# Patient Record
Sex: MALE | Race: WHITE | Employment: UNEMPLOYED | URBAN - NONMETROPOLITAN AREA
[De-identification: names, ages, dates, MRNs, and addresses within clinical notes are randomized per-mention and may not be internally consistent; named-entity substitution may affect disease eponyms.]

---

## 2017-01-11 RX ORDER — METOPROLOL TARTRATE 50 MG/1
TABLET, FILM COATED ORAL
Qty: 180 TABLET | Refills: 0 | OUTPATIENT
Start: 2017-01-11

## 2017-03-08 RX ORDER — GABAPENTIN 300 MG/1
CAPSULE ORAL
Qty: 90 CAPSULE | Refills: 0 | OUTPATIENT
Start: 2017-03-08

## 2017-03-08 RX ORDER — OMEPRAZOLE 20 MG/1
CAPSULE, DELAYED RELEASE ORAL
Qty: 90 CAPSULE | Refills: 0 | OUTPATIENT
Start: 2017-03-08

## 2017-04-25 ENCOUNTER — TELEPHONE (OUTPATIENT)
Dept: CARDIOLOGY | Age: 52
End: 2017-04-25

## 2017-07-10 ENCOUNTER — TELEPHONE (OUTPATIENT)
Dept: FAMILY MEDICINE CLINIC | Age: 52
End: 2017-07-10

## 2017-07-10 PROBLEM — Z79.4 UNCONTROLLED TYPE 2 DIABETES MELLITUS WITH HYPERGLYCEMIA, WITH LONG-TERM CURRENT USE OF INSULIN (HCC): Chronic | Status: ACTIVE | Noted: 2017-07-10

## 2017-07-10 PROBLEM — Z72.0 TOBACCO ABUSE: Chronic | Status: ACTIVE | Noted: 2017-07-10

## 2017-07-10 PROBLEM — E11.65 UNCONTROLLED TYPE 2 DIABETES MELLITUS WITH HYPERGLYCEMIA, WITH LONG-TERM CURRENT USE OF INSULIN (HCC): Chronic | Status: ACTIVE | Noted: 2017-07-10

## 2017-07-10 PROBLEM — M79.641 RIGHT HAND PAIN: Status: ACTIVE | Noted: 2017-07-10

## 2017-09-05 ENCOUNTER — TELEPHONE (OUTPATIENT)
Dept: CARDIOLOGY CLINIC | Age: 52
End: 2017-09-05

## 2017-10-06 ENCOUNTER — HOSPITAL ENCOUNTER (EMERGENCY)
Age: 52
Discharge: HOME | End: 2017-10-06
Payer: MEDICAID

## 2017-10-06 ENCOUNTER — HOSPITAL ENCOUNTER (EMERGENCY)
Dept: GENERAL RADIOLOGY | Age: 52
Discharge: HOME | End: 2017-10-06
Payer: MEDICAID

## 2017-10-06 VITALS
SYSTOLIC BLOOD PRESSURE: 207 MMHG | DIASTOLIC BLOOD PRESSURE: 115 MMHG | RESPIRATION RATE: 16 BRPM | BODY MASS INDEX: 31.5 KG/M2 | HEIGHT: 70 IN | OXYGEN SATURATION: 98 % | WEIGHT: 220 LBS | HEART RATE: 89 BPM | TEMPERATURE: 98.4 F

## 2017-10-06 DIAGNOSIS — S20.211A RIB CONTUSION, RIGHT, INITIAL ENCOUNTER: Primary | ICD-10-CM

## 2017-10-06 PROCEDURE — 71100 X-RAY EXAM RIBS UNI 2 VIEWS: CPT

## 2017-10-06 PROCEDURE — 99214 OFFICE O/P EST MOD 30 MIN: CPT

## 2017-10-06 PROCEDURE — 99214 OFFICE O/P EST MOD 30 MIN: CPT | Performed by: NURSE PRACTITIONER

## 2017-10-06 RX ORDER — METOPROLOL TARTRATE 50 MG/1
50 TABLET, FILM COATED ORAL 2 TIMES DAILY
Qty: 60 TABLET | Refills: 0 | Status: SHIPPED | OUTPATIENT
Start: 2017-10-06 | End: 2022-04-11 | Stop reason: SDUPTHER

## 2017-10-06 RX ORDER — TRAMADOL HYDROCHLORIDE 50 MG/1
50 TABLET ORAL EVERY 6 HOURS PRN
Qty: 10 TABLET | Refills: 0 | Status: SHIPPED | OUTPATIENT
Start: 2017-10-06 | End: 2017-10-09

## 2017-10-06 RX ORDER — PREDNISONE 20 MG/1
20 TABLET ORAL 2 TIMES DAILY
Qty: 10 TABLET | Refills: 0 | Status: SHIPPED | OUTPATIENT
Start: 2017-10-06 | End: 2017-10-11

## 2017-10-06 ASSESSMENT — ENCOUNTER SYMPTOMS
SORE THROAT: 0
EYE DISCHARGE: 0
ABDOMINAL PAIN: 0
COUGH: 0
WHEEZING: 0
BACK PAIN: 0
VOMITING: 0
EYE PAIN: 0
CONSTIPATION: 0
COLOR CHANGE: 0
SHORTNESS OF BREATH: 0
DIARRHEA: 0
RHINORRHEA: 0
STRIDOR: 0
NAUSEA: 0

## 2017-10-06 ASSESSMENT — PAIN DESCRIPTION - LOCATION: LOCATION: RIB CAGE

## 2017-10-06 ASSESSMENT — PAIN DESCRIPTION - ORIENTATION: ORIENTATION: RIGHT

## 2017-10-06 ASSESSMENT — PAIN SCALES - GENERAL: PAINLEVEL_OUTOF10: 10

## 2017-10-06 NOTE — ED AVS SNAPSHOT
After Visit Summary  (Discharge Instructions)    Medication List for Home    Based on the information you provided to us as well as any changes during this visit, the following is your updated medication list.  Compare this with your prescription bottles at home. If you have any questions or concerns, contact your primary care physician's office. Daily Medication List (This medication list can be shared with any Healthcare provider who is helping you manage your medications)      There are NEW medications for you. START taking them after you leave the hospital     predniSONE 20 MG tablet   Commonly known as:  DELTASONE   Take 1 tablet by mouth 2 times daily for 5 days       traMADol 50 MG tablet   Commonly known as:  ULTRAM   Take 1 tablet by mouth every 6 hours as needed for Pain         These are medications you told us you were taking at home, CONTINUE taking them after you leave the hospital     metoprolol tartrate 50 MG tablet   Commonly known as:  LOPRESSOR   Take 1 tablet by mouth 2 times daily         ASK your doctor about these medications if you have questions     * albuterol (5 MG/ML) 0.5% nebulizer solution   Commonly known as:  PROVENTIL   Take 1 mL by nebulization 4 times daily as needed for Wheezing       * albuterol sulfate  (90 Base) MCG/ACT inhaler   Commonly known as:  PROVENTIL HFA   Inhale 2 puffs into the lungs every 6 hours as needed for Wheezing       Alcohol Swabs 70 % Pads   USE AS DIRECTED       amLODIPine 10 MG tablet   Commonly known as:  NORVASC   Take 1 tablet by mouth daily       aspirin 325 MG EC tablet   Take 1 tablet by mouth daily. Blood Pressure Cuff Misc   1 Device by Does not apply route 2 times daily.        DULoxetine 60 MG extended release capsule   Commonly known as:  CYMBALTA   TAKE ONE CAPSULE BY MOUTH DAILY       fluticasone-salmeterol 250-50 MCG/DOSE Aepb   Commonly known as:  ADVAIR DISKUS   Inhale 1 puff into the lungs every 12 hours gabapentin 300 MG capsule   Commonly known as:  NEURONTIN   Take 1 capsule by mouth daily       gemfibrozil 600 MG tablet   Commonly known as:  LOPID   TAKE 1 TABLET BY MOUTH TWICE DAILY       glucose 4 g chewable tablet   Commonly known as:  [facility] GLUCOSE   Take 4 tablets by mouth as needed for Low blood sugar. glucose blood VI test strips strip   Commonly known as:  FREESTYLE TEST STRIPS   Test TID dx code E11.9       * glucose monitoring kit monitoring kit   1 kit by Does not apply route daily as needed. * glucose monitoring kit monitoring kit   1 kit by Does not apply route daily as needed       hydrALAZINE 10 MG tablet   Commonly known as:  APRESOLINE   TAKE ONE TABLET BY MOUTH TWICE DAILY       hydrochlorothiazide 25 MG tablet   Commonly known as:  HYDRODIURIL   Take 1 tablet by mouth daily       insulin glargine 100 UNIT/ML injection vial   Commonly known as:  LANTUS   Inject 50 Units into the skin nightly       insulin lispro 100 UNIT/ML injection vial   Commonly known as:  HUMALOG   Inject 40 Units into the skin 3 times daily (before meals) As needed       Insulin Syringe-Needle U-100 30G X 1/2\" 0.5 ML Misc   1 each by Does not apply route 3 times daily. loratadine 10 MG tablet   Commonly known as:  CLARITIN   Take 1 tablet by mouth daily       losartan 50 MG tablet   Commonly known as:  COZAAR   Take 1 tablet by mouth 2 times daily       metFORMIN 1000 MG tablet   Commonly known as:  GLUCOPHAGE   TAKE 1 TABLET BY MOUTH TWICE DAILY WITH MEALS       nicotine 21 MG/24HR   Commonly known as:  NICODERM CQ   Place 1 patch onto the skin daily       nitroGLYCERIN 0.4 MG SL tablet   Commonly known as:  NITROSTAT   Place 1 tablet under the tongue every 5 minutes as needed for Chest pain.        omeprazole 20 MG delayed release capsule   Commonly known as:  PRILOSEC   TAKE ONE CAPSULE BY MOUTH EVERY DAY       pravastatin 40 MG tablet   Commonly known as:  PRAVACHOL Take 1 tablet by mouth daily       ranitidine 150 MG capsule   Commonly known as:  ZANTAC   TAKE 1 CAPSULE BY MOUTH TWICE DAILY       [facility] Ultra Thin Lancets Misc   1 Device by Does not apply route daily. * Notice: This list has 4 medication(s) that are the same as other medications prescribed for you. Read the directions carefully, and ask your doctor or other care provider to review them with you. Where to Get Your Medications      [facility]  [place] [place] - [address] - F [phone]  [address] [place] [address] [address], [place] [address]    Hours:  24-hours Phone:  [phone]     metoprolol tartrate 50 MG tablet    predniSONE 20 MG tablet    traMADol 50 MG tablet               Allergies as of 10/6/2017        Reactions    Cephalexin Swelling    Januvia [Sitagliptin] Swelling    Naproxen     unknown    Quinapril Hcl Swelling      Immunizations as of 10/6/2017     No immunizations on file. After Visit Summary    This summary was created for you. Thank you for entrusting your care to us. The following information includes details about your hospital/visit stay along with steps you should take to help with your recovery once you leave the hospital.  In this packet, you will find information about the topics listed below:    · Instructions about your medications including a list of your home medications  · A summary of your hospital visit  · Follow-up appointments once you have left the hospital  · Your care plan at home      You may receive a survey regarding the care you received during your stay. Your input is valuable to us. We encourage you to complete and return your survey in the envelope provided. We hope you will choose us in the future for your healthcare needs.           Patient Information     Patient Name     Ami Postin 1965      Care Provided at:     Name Address Phone [address] [phone]            Your Visit    Here you will find information about your visit, including the reason for your visit. Please take this sheet with you when you visit your doctor or other health care provider in the future. It will help determine the best possible medical care for you at that time. If you have any questions once you leave the hospital, please call the department phone number listed below. Diagnoses this visit     Your diagnosis was RIB CONTUSION, RIGHT, INITIAL ENCOUNTER. Visit Information     Date of Visit Department Dept Phone    10/6/2017 [facility]. [address] [facility] [phone]      You were seen by     You were seen by Ro Harris NP. Follow-up Appointments    Below is a list of your follow-up and future appointments. This may not be a complete list as you may have made appointments directly with providers that we are not aware of or your providers may have made some for you. Please call your providers to confirm appointments. It is important to keep your appointments. Please bring your current insurance card, photo ID, co-pay, and all medication bottles to your appointment. If self-pay, payment is expected at the time of service. Follow-up Information     Follow up with Josh Ralph MD In 3 days. Specialty:  Family Medicine    Why:  If symptoms worsen    Contact information:    Roberto Patel [phone]        Preventive Care        Date Due    HIV screening is recommended for all people regardless of risk factors  aged 15-65 years at least once (lifetime) who have never been HIV tested.  12/25/1981    Tetanus Combination Vaccine (1 - Tdap) 12/25/1985    Pneumococcal Vaccine - Pneumovax for adults aged 19-64 years with: chronic heart disease, chronic lung disease, diabetes mellitus, have a clinical question, please call your doctor's office. View your information online  ? Review your current list of  medications, immunization, and allergies. ? Review your future test results online . ? Review your discharge instructions provided by your caregivers at discharge    Certain functionality such as prescription refills, scheduling appointments or sending messages to your provider are not activated if your provider does not use [employer] in his/her office    For questions regarding your [employer] account call [phone]. If you have a clinical question, please call your doctor's office. The information on all pages of the After Visit Summary has been reviewed with me, the patient and/or responsible adult, by my health care provider(s). I had the opportunity to ask questions regarding this information. I understand I should dispose of my armband safely at home to protect my health information. A complete copy of the After Visit Summary has been given to me, the patient and/or responsible adult. Patient Signature/Responsible Adult: ___________________________________    Nurse Signature: ___________________________________  Resident/MLP Signature: ___________________________________  Attending Signature: ___________________________________    Date:____________Time:____________              Discharge Instructions            Chest Contusion: Care Instructions  Your Care Instructions  A chest contusion, or bruise, is caused by a fall or direct blow to the chest. Car crashes, falls, getting punched, and injury from bicycle handlebars are common causes of chest contusions. A very forceful blow to the chest can injure the heart or blood vessels in the chest, the lungs, the airway, the liver, or the spleen. Pain may be caused by an injury to muscles, cartilage, or ribs. Deep breathing, coughing, or sneezing can increase your pain. Lying on the injured area also can cause pain. Follow-up care is a key part of your treatment and safety. Be sure to make and go to all appointments, and call your doctor if you are having problems. It's also a good idea to know your test results and keep a list of the medicines you take. How can you care for yourself at home? · Rest and protect the injured or sore area. Stop, change, or take a break from any activity that may be causing your pain. · Put ice or a cold pack on the area for 10 to 20 minutes at a time. Put a thin cloth between the ice and your skin. · After 2 or 3 days, if your swelling is gone, apply a heating pad set on low or a warm cloth to your chest. Some doctors suggest that you go back and forth between hot and cold. Put a thin cloth between the heating pad and your skin. · Do not wrap or tape your ribs for support. This may cause you to take smaller breaths, which could increase your risk of pneumonia and lung collapse. · Ask your doctor if you can take an over-the-counter pain medicine, such as acetaminophen (Tylenol), ibuprofen (Advil, Motrin), or naproxen (Aleve). Be safe with medicines. Read and follow all instructions on the label. · Take your medicines exactly as prescribed. Call your doctor if you think you are having a problem with your medicine. · Gentle stretching and massage may help you feel better after a few days of rest. Stretch slowly to the point just before discomfort begins, then hold the stretch for at least 15 to 30 seconds. Do this 3 or 4 times per day. · As your pain gets better, slowly return to your normal activities. Be patient, because chest bruises can take weeks or months to heal. Any increased pain may be a sign that you need to rest a while longer. When should you call for help? Call 911 anytime you think you may need emergency care. For example, call if:  · You have severe trouble breathing. · You cough up blood. Call your doctor now or seek immediate medical care if:  · You have belly pain. · You are dizzy or lightheaded, or you feel like you may faint. · You develop new symptoms with the chest pain. · Your chest pain gets worse. · You have a fever. · You have some shortness of breath. · You have a cough that brings up mucus from the lungs. Watch closely for changes in your health, and be sure to contact your doctor if:  · Your chest pain is not improving after 1 week. Where can you learn more? Go to https://[employer]. org and sign in to your Work[employer] account. Enter I174 in the [employer] box to learn more about \"Chest Contusion: Care Instructions. \"     If you do not have an account, please click on the \"Sign Up Now\" link. Current as of: March 20, 2017  Content Version: 11.3  © 8018-9780 [employer]. Care instructions adapted under license by [facility] ([facility]). If you have questions about a medical condition or this instruction, always ask your healthcare professional. [place] [address] any warranty or liability for your use of this information.

## 2017-10-06 NOTE — ED PROVIDER NOTES
[facility] URGENT CARE  Urgent Care Encounter      CHIEF COMPLAINT       Chief Complaint   Patient presents with    Rib Pain     right fell at home hit ribs on fender of truck       Nurses Notes reviewed and I agree except as noted in the HPI. HISTORY OF PRESENT ILLNESS   Cristi Britton is a 49 y.o. male who presents With right anterior rib pain after slipping off of a step device well working on a pickup truck. He states he struck his ribs on the bumper of the vehicle. The pain in his right ribs is getting worse. He denies coughing, hemoptysis, shortness of breath or fever. REVIEW OF SYSTEMS     Review of Systems   Constitutional: Negative for activity change, appetite change, chills, fatigue and fever. HENT: Negative for congestion, ear pain, rhinorrhea and sore throat. Eyes: Negative for pain and discharge. Respiratory: Negative for cough, shortness of breath, wheezing and stridor. Cardiovascular: Positive for chest pain. Gastrointestinal: Negative for abdominal pain, constipation, diarrhea, nausea and vomiting. Genitourinary: Negative for dysuria, flank pain and frequency. Musculoskeletal: Negative for arthralgias, back pain, myalgias and neck pain. Skin: Negative for color change and rash. Allergic/Immunologic: Negative for immunocompromised state. Neurological: Negative for dizziness, weakness and headaches. Psychiatric/Behavioral: Negative. PAST MEDICAL HISTORY         Diagnosis Date    Abscess of leg     Allergic rhinitis     Asthma     GERD (gastroesophageal reflux disease)     Headache     Hyperlipidemia     Hypertension     Neuropathy (HCC)     Osteoarthritis     Other disorders of kidney and ureter     Type II or unspecified type diabetes mellitus without mention of complication, not stated as uncontrolled        SURGICAL HISTORY     Patient  has a past surgical history that includes Foot surgery (Right, 2008);  Hand surgery (Left, 2010); Disp-100 each, R-3, NormalTest TID dx code E11.9      Alcohol Swabs 70 % PADS Disp-100 each, R-11, Normal      albuterol (PROVENTIL) (5 MG/ML) 0.5% nebulizer solution Take 1 mL by nebulization 4 times daily as needed for Wheezing, Disp-1 Package, R-1      albuterol (PROVENTIL HFA) 108 (90 BASE) MCG/ACT inhaler Inhale 2 puffs into the lungs every 6 hours as needed for Wheezing, Disp-1 Inhaler, R-1      pravastatin (PRAVACHOL) 40 MG tablet Take 1 tablet by mouth daily, Disp-90 tablet, R-1      fluticasone-salmeterol (ADVAIR DISKUS) 250-50 MCG/DOSE AEPB Inhale 1 puff into the lungs every 12 hours, Disp-3 each, R-1      Insulin Syringe-Needle U-100 30G X 1/2\" 0.5 ML MISC 3 TIMES DAILY Starting 4/6/2015, Until Discontinued, Disp-100 each, R-6, Normal      Blood Pressure Monitoring (BLOOD PRESSURE CUFF) MISC 2 TIMES DAILY Starting 4/17/2014, Until Discontinued, Disp-1 each, R-0, Normal      glucose ([facility] GLUCOSE) 4 G chewable tablet Take 4 tablets by mouth as needed for Low blood sugar., Disp-60 tablet, R-3      !! glucose monitoring kit (FREESTYLE) monitoring kit DAILY PRN Starting 4/17/2014, Until Discontinued, Disp-1 kit, R-0, Normal      [facility] Ultra Thin Lancets MISC DAILY Starting 4/17/2014, Until Discontinued, Disp-100 each, R-3, Normal      nitroGLYCERIN (NITROSTAT) 0.4 MG SL tablet Place 1 tablet under the tongue every 5 minutes as needed for Chest pain., Disp-25 tablet, R-3       !! - Potential duplicate medications found. Please discuss with provider. ALLERGIES     Patient is is allergic to cephalexin; januvia [sitagliptin]; naproxen; and quinapril hcl. FAMILY HISTORY     Patient's family history includes Heart Disease in his father; High Blood Pressure in his mother. SOCIAL HISTORY     Patient  reports that he has been smoking Cigars. He has never used smokeless tobacco. He reports that he does not drink alcohol or use illicit drugs.     PHYSICAL EXAM     ED TRIAGE VITALS  BP: (!) 207/115 Sherri Bal CNP informed), Temp: 98.4 °F (36.9 °C), Pulse: 89, Resp: 16, SpO2: 98 %  Physical Exam   Constitutional: He is oriented to person, place, and time. He appears well-developed and well-nourished. No distress. Eyes: Conjunctivae are normal. Right eye exhibits no discharge. Left eye exhibits no discharge. Cardiovascular: Normal rate. Pulmonary/Chest: Effort normal. No respiratory distress. Musculoskeletal: Normal range of motion. He exhibits tenderness. He exhibits no edema or deformity. Neurological: He is alert and oriented to person, place, and time. Skin: Skin is warm and dry. No rash noted. He is not diaphoretic. No erythema. No pallor. Psychiatric: He has a normal mood and affect. His behavior is normal. Judgment and thought content normal.   Nursing note and vitals reviewed. DIAGNOSTIC RESULTS   Labs:No results found for this visit on 10/06/17. IMAGING:    URGENT CARE COURSE:     Vitals:    10/06/17 1749 10/06/17 1842   BP: (!) 203/103 (!) 207/115   Pulse: 89    Resp: 16    Temp: 98.4 °F (36.9 °C)    SpO2: 98%    Weight: 220 lb (99.8 kg)    Height: 5' 10\" (1.778 m)      Patient shows no bruising in the area pain, which is in the anterior lower right portion of the rib cage above the level of the liver. He is quite tender to palpation, states that pain is worse with cough or deep breath. Lungs are clear to auscultation. He smokes cigars and has many significant comorbidities such as asthma, tobacco abuse and type 2 diabetes. Chest x-ray is negative for findings. Patient is noted to have a blood pressure over 243 systolically while in the urgent care. This is discussed with he and his wife the patient admits he has not taken his Lopressor since February of this year. He is apparently noncompliant on most of his medications at this time.   It was discussed that the provider here at the urgent care with update his Lopressor prescription for 30 days on the promise that he returns to his PCP to be evaluated and get himself back on track with his medication. Patient is wife state they will do this. It was explained that the urgent care is not proper place to manage chronic illnesses but that his blood pressure is so high that we must attempt to get some control of this in the next few days. Medications - No data to display  PROCEDURES:  None  FINAL IMPRESSION      1.  Rib contusion, right, initial encounter        DISPOSITION/PLAN   DISPOSITION Decision to Discharge  [phone]    In 3 days  If symptoms worsen    DISCHARGE MEDICATIONS:  Discharge Medication List as of 10/6/2017  6:39 PM      START taking these medications    Details   predniSONE (DELTASONE) 20 MG tablet Take 1 tablet by mouth 2 times daily for 5 days, Disp-10 tablet, R-0Normal      traMADol (ULTRAM) 50 MG tablet Take 1 tablet by mouth every 6 hours as needed for Pain, Disp-10 tablet, R-0Normal           Discharge Medication List as of 10/6/2017  6:39 PM      CONTINUE these medications which have CHANGED    Details   metoprolol tartrate (LOPRESSOR) 50 MG tablet Take 1 tablet by mouth 2 times daily, Disp-60 tablet, R-0Normal             VIDAL Disla NP  10/06/17 0920

## 2017-10-06 NOTE — ED TRIAGE NOTES
Discussed importance of taking meds and keeping BP under control to prevent health complications such as stroke

## 2017-10-24 ENCOUNTER — HOSPITAL ENCOUNTER (INPATIENT)
Age: 52
LOS: 2 days | Discharge: HOME | DRG: 199 | End: 2017-10-26
Attending: FAMILY MEDICINE | Admitting: INTERNAL MEDICINE
Payer: MEDICAID

## 2017-10-24 ENCOUNTER — APPOINTMENT (OUTPATIENT)
Dept: GENERAL RADIOLOGY | Age: 52
DRG: 199 | End: 2017-10-24
Payer: MEDICAID

## 2017-10-24 ENCOUNTER — APPOINTMENT (OUTPATIENT)
Dept: CT IMAGING | Age: 52
DRG: 199 | End: 2017-10-24
Payer: MEDICAID

## 2017-10-24 DIAGNOSIS — R80.9 PROTEINURIA, UNSPECIFIED TYPE: ICD-10-CM

## 2017-10-24 DIAGNOSIS — R10.12 LEFT UPPER QUADRANT PAIN: ICD-10-CM

## 2017-10-24 DIAGNOSIS — I16.0 HYPERTENSIVE URGENCY: ICD-10-CM

## 2017-10-24 DIAGNOSIS — R82.4 KETONURIA: Primary | ICD-10-CM

## 2017-10-24 DIAGNOSIS — R82.2 BILIRUBINURIA: ICD-10-CM

## 2017-10-24 PROBLEM — I10 ESSENTIAL HYPERTENSION: Status: ACTIVE | Noted: 2017-10-24

## 2017-10-24 PROBLEM — D72.829 LEUKOCYTOSIS: Status: ACTIVE | Noted: 2017-10-24

## 2017-10-24 PROBLEM — M79.641 RIGHT HAND PAIN: Status: RESOLVED | Noted: 2017-07-10 | Resolved: 2017-10-24

## 2017-10-24 LAB
ALBUMIN SERPL-MCNC: 4.6 G/DL (ref 3.5–5.1)
ALLEN TEST: POSITIVE
ALP BLD-CCNC: 100 U/L (ref 38–126)
ALT SERPL-CCNC: 17 U/L (ref 11–66)
ANION GAP SERPL CALCULATED.3IONS-SCNC: 13 MEQ/L (ref 8–16)
AST SERPL-CCNC: 15 U/L (ref 5–40)
BASE EXCESS (CALCULATED): 0.6 MMOL/L (ref -2.5–2.5)
BASOPHILS # BLD: 0.8 %
BASOPHILS ABSOLUTE: 0.1 THOU/MM3 (ref 0–0.1)
BETA-HYDROXYBUTYRATE: 1.31 MG/DL (ref 0.2–2.81)
BILIRUB SERPL-MCNC: 0.8 MG/DL (ref 0.3–1.2)
BILIRUBIN DIRECT: < 0.2 MG/DL (ref 0–0.3)
BILIRUBIN URINE: ABNORMAL
BLOOD, URINE: NEGATIVE
BUN BLDV-MCNC: 11 MG/DL (ref 7–22)
CALCIUM SERPL-MCNC: 9.7 MG/DL (ref 8.5–10.5)
CHARACTER, URINE: CLEAR
CHLORIDE BLD-SCNC: 97 MEQ/L (ref 98–111)
CO2: 28 MEQ/L (ref 23–33)
COLLECTED BY:: ABNORMAL
COLOR: ABNORMAL
CREAT SERPL-MCNC: 1 MG/DL (ref 0.4–1.2)
D-DIMER QUANTITATIVE: 341 NG/ML FEU (ref 0–500)
DEVICE: ABNORMAL
EOSINOPHIL # BLD: 1 %
EOSINOPHILS ABSOLUTE: 0.1 THOU/MM3 (ref 0–0.4)
GFR SERPL CREATININE-BSD FRML MDRD: 79 ML/MIN/1.73M2
GLUCOSE BLD-MCNC: 191 MG/DL (ref 70–108)
GLUCOSE, URINE: 100 MG/DL
HCO3: 25 MMOL/L (ref 23–28)
HCT VFR BLD CALC: 49.3 % (ref 42–52)
HEMOGLOBIN: 17.4 GM/DL (ref 14–18)
KETONES, URINE: 15
LACTIC ACID: 1.7 MMOL/L (ref 0.5–2.2)
LEUKOCYTES, UA: NEGATIVE
LIPASE: 41.2 U/L (ref 5.6–51.3)
LYMPHOCYTES # BLD: 11.4 %
LYMPHOCYTES ABSOLUTE: 1.5 THOU/MM3 (ref 1–4.8)
MCH RBC QN AUTO: 29.2 PG (ref 27–31)
MCHC RBC AUTO-ENTMCNC: 35.3 GM/DL (ref 33–37)
MCV RBC AUTO: 82.9 FL (ref 80–94)
MONOCYTES # BLD: 4.6 %
MONOCYTES ABSOLUTE: 0.6 THOU/MM3 (ref 0.4–1.3)
NITRATE, UA: NEGATIVE
NUCLEATED RED BLOOD CELLS: 0 /100 WBC
O2 SATURATION: 91 %
OSMOLALITY CALCULATION: 280.2 MOSMOL/KG (ref 275–300)
PCO2: 39 MMHG (ref 35–45)
PDW BLD-RTO: 13.3 % (ref 11.5–14.5)
PH BLOOD GAS: 7.42 (ref 7.35–7.45)
PH UA: 6 (ref 5–9)
PLATELET # BLD: 171 THOU/MM3 (ref 130–400)
PMV BLD AUTO: 8.8 MCM (ref 7.4–10.4)
PO2: 59 MMHG (ref 71–104)
POTASSIUM SERPL-SCNC: 4.5 MEQ/L (ref 3.5–5.2)
PROTEIN UA: 100 MG/DL
RBC # BLD: 5.95 MILL/MM3 (ref 4.7–6.1)
REFLEX TO URINE C & S: ABNORMAL
SEG NEUTROPHILS: 82.2 %
SEGMENTED NEUTROPHILS ABSOLUTE COUNT: 10.7 THOU/MM3 (ref 1.8–7.7)
SODIUM BLD-SCNC: 138 MEQ/L (ref 135–145)
SOURCE, BLOOD GAS: ABNORMAL
SPECIFIC GRAVITY UA: 1.02 (ref 1–1.03)
TOTAL PROTEIN: 7.6 G/DL (ref 6.1–8)
TROPONIN T: < 0.01 NG/ML
UROBILINOGEN, URINE: 2 EU/DL (ref 0–1)
WBC # BLD: 13 THOU/MM3 (ref 4.8–10.8)

## 2017-10-24 PROCEDURE — 82248 BILIRUBIN DIRECT: CPT

## 2017-10-24 PROCEDURE — 84145 PROCALCITONIN (PCT): CPT

## 2017-10-24 PROCEDURE — 80053 COMPREHEN METABOLIC PANEL: CPT

## 2017-10-24 PROCEDURE — 74176 CT ABD & PELVIS W/O CONTRAST: CPT

## 2017-10-24 PROCEDURE — 85025 COMPLETE CBC W/AUTO DIFF WBC: CPT

## 2017-10-24 PROCEDURE — 99215 OFFICE O/P EST HI 40 MIN: CPT

## 2017-10-24 PROCEDURE — 36600 WITHDRAWAL OF ARTERIAL BLOOD: CPT

## 2017-10-24 PROCEDURE — 82010 KETONE BODYS QUAN: CPT

## 2017-10-24 PROCEDURE — 99213 OFFICE O/P EST LOW 20 MIN: CPT | Performed by: NURSE PRACTITIONER

## 2017-10-24 PROCEDURE — 99285 EMERGENCY DEPT VISIT HI MDM: CPT

## 2017-10-24 PROCEDURE — 96374 THER/PROPH/DIAG INJ IV PUSH: CPT

## 2017-10-24 PROCEDURE — 83605 ASSAY OF LACTIC ACID: CPT

## 2017-10-24 PROCEDURE — 2140000000 HC CCU INTERMEDIATE R&B

## 2017-10-24 PROCEDURE — 85379 FIBRIN DEGRADATION QUANT: CPT

## 2017-10-24 PROCEDURE — 84484 ASSAY OF TROPONIN QUANT: CPT

## 2017-10-24 PROCEDURE — 96361 HYDRATE IV INFUSION ADD-ON: CPT

## 2017-10-24 PROCEDURE — 99222 1ST HOSP IP/OBS MODERATE 55: CPT | Performed by: NURSE PRACTITIONER

## 2017-10-24 PROCEDURE — 2580000003 HC RX 258: Performed by: FAMILY MEDICINE

## 2017-10-24 PROCEDURE — 83690 ASSAY OF LIPASE: CPT

## 2017-10-24 PROCEDURE — 71020 XR CHEST STANDARD TWO VW: CPT

## 2017-10-24 PROCEDURE — 93005 ELECTROCARDIOGRAM TRACING: CPT

## 2017-10-24 PROCEDURE — 36415 COLL VENOUS BLD VENIPUNCTURE: CPT

## 2017-10-24 PROCEDURE — 82803 BLOOD GASES ANY COMBINATION: CPT

## 2017-10-24 PROCEDURE — 2500000003 HC RX 250 WO HCPCS: Performed by: FAMILY MEDICINE

## 2017-10-24 PROCEDURE — 81003 URINALYSIS AUTO W/O SCOPE: CPT

## 2017-10-24 RX ORDER — LABETALOL HYDROCHLORIDE 5 MG/ML
20 INJECTION, SOLUTION INTRAVENOUS ONCE
Status: COMPLETED | OUTPATIENT
Start: 2017-10-24 | End: 2017-10-24

## 2017-10-24 RX ORDER — SODIUM CHLORIDE 9 MG/ML
INJECTION, SOLUTION INTRAVENOUS CONTINUOUS
Status: DISCONTINUED | OUTPATIENT
Start: 2017-10-24 | End: 2017-10-25

## 2017-10-24 RX ORDER — 0.9 % SODIUM CHLORIDE 0.9 %
1000 INTRAVENOUS SOLUTION INTRAVENOUS ONCE
Status: COMPLETED | OUTPATIENT
Start: 2017-10-24 | End: 2017-10-24

## 2017-10-24 RX ADMIN — LABETALOL HYDROCHLORIDE 1 MG/MIN: 5 INJECTION, SOLUTION INTRAVENOUS at 22:29

## 2017-10-24 RX ADMIN — SODIUM CHLORIDE: 9 INJECTION, SOLUTION INTRAVENOUS at 22:34

## 2017-10-24 RX ADMIN — LABETALOL HYDROCHLORIDE 20 MG: 5 INJECTION, SOLUTION INTRAVENOUS at 21:20

## 2017-10-24 RX ADMIN — SODIUM CHLORIDE 1000 ML: 9 INJECTION, SOLUTION INTRAVENOUS at 21:20

## 2017-10-24 ASSESSMENT — PAIN DESCRIPTION - DESCRIPTORS: DESCRIPTORS: ACHING

## 2017-10-24 ASSESSMENT — ENCOUNTER SYMPTOMS
VOMITING: 0
SORE THROAT: 0
HEMATOCHEZIA: 0
SHORTNESS OF BREATH: 0
WHEEZING: 0
COUGH: 0
BLOOD IN STOOL: 0
FLATUS: 1
DIARRHEA: 0
NAUSEA: 1
CONSTIPATION: 1
EYE DISCHARGE: 0
BELCHING: 1
ABDOMINAL PAIN: 1
VOMITING: 1
HEMATEMESIS: 0

## 2017-10-24 ASSESSMENT — PAIN DESCRIPTION - ORIENTATION: ORIENTATION: LEFT

## 2017-10-24 ASSESSMENT — PAIN DESCRIPTION - LOCATION: LOCATION: ABDOMEN;FLANK

## 2017-10-24 ASSESSMENT — PAIN SCALES - GENERAL: PAINLEVEL_OUTOF10: 8

## 2017-10-24 ASSESSMENT — PAIN DESCRIPTION - PAIN TYPE: TYPE: ACUTE PAIN

## 2017-10-24 NOTE — ED PROVIDER NOTES
[facility] URGENT CARE  Urgent Care Encounter      CHIEF COMPLAINT       Chief Complaint   Patient presents with    Abdominal Pain    Emesis    Flank Pain       Nurses Notes reviewed and I agree except as noted in the HPI. HISTORY OF PRESENT ILLNESS   Kamar Hammond is a 49 y.o. The history is provided by the patient and a parent. No  was used. Abdominal Pain   Pain location:  Generalized  Pain quality: bloating    Pain radiates to:  Epigastric region and R flank  Pain severity:  Moderate  Onset quality:  Gradual  Duration:  2 days  Timing:  Constant  Progression:  Worsening  Chronicity:  New  Context: previous surgery    Context: not alcohol use, not awakening from sleep, not diet changes, not eating, not laxative use, not medication withdrawal, not recent illness, not recent sexual activity, not recent travel, not retching, not sick contacts, not suspicious food intake and not trauma    Relieved by:  Belching  Ineffective treatments:  Movement, flatus, lying down, liquids, OTC medications and urination  Associated symptoms: anorexia, belching, constipation, flatus, nausea and vomiting    Associated symptoms: no chest pain, no chills, no cough, no diarrhea, no dysuria, no fatigue, no hematemesis, no hematochezia, no hematuria, no melena, no shortness of breath, no sore throat, no vaginal bleeding and no vaginal discharge    Risk factors: no alcohol abuse, no aspirin use, not elderly, has not had multiple surgeries, no NSAID use, not obese and no recent hospitalization        REVIEW OF SYSTEMS     Review of Systems   Constitutional: Negative for chills and fatigue. HENT: Negative for sore throat. Respiratory: Negative for cough and shortness of breath. Cardiovascular: Negative for chest pain. Gastrointestinal: Positive for abdominal pain, anorexia, constipation, flatus, nausea and vomiting. Negative for diarrhea, hematemesis, hematochezia and melena.

## 2017-10-25 LAB
EKG ATRIAL RATE: 99 BPM
EKG P AXIS: 56 DEGREES
EKG P-R INTERVAL: 186 MS
EKG Q-T INTERVAL: 338 MS
EKG QRS DURATION: 86 MS
EKG QTC CALCULATION (BAZETT): 433 MS
EKG R AXIS: 4 DEGREES
EKG T AXIS: 34 DEGREES
EKG VENTRICULAR RATE: 99 BPM
GLUCOSE BLD-MCNC: 150 MG/DL (ref 70–108)
GLUCOSE BLD-MCNC: 183 MG/DL (ref 70–108)
GLUCOSE BLD-MCNC: 190 MG/DL (ref 70–108)
GLUCOSE BLD-MCNC: 192 MG/DL (ref 70–108)
GLUCOSE BLD-MCNC: 216 MG/DL (ref 70–108)
MRSA SCREEN RT-PCR: NEGATIVE
PROCALCITONIN: 0.06 NG/ML (ref 0.01–0.09)

## 2017-10-25 PROCEDURE — 6370000000 HC RX 637 (ALT 250 FOR IP): Performed by: INTERNAL MEDICINE

## 2017-10-25 PROCEDURE — 94640 AIRWAY INHALATION TREATMENT: CPT

## 2017-10-25 PROCEDURE — 2580000003 HC RX 258: Performed by: FAMILY MEDICINE

## 2017-10-25 PROCEDURE — 87081 CULTURE SCREEN ONLY: CPT

## 2017-10-25 PROCEDURE — 99232 SBSQ HOSP IP/OBS MODERATE 35: CPT | Performed by: INTERNAL MEDICINE

## 2017-10-25 PROCEDURE — 6360000002 HC RX W HCPCS: Performed by: NURSE PRACTITIONER

## 2017-10-25 PROCEDURE — 2500000003 HC RX 250 WO HCPCS: Performed by: FAMILY MEDICINE

## 2017-10-25 PROCEDURE — 1200000000 HC SEMI PRIVATE

## 2017-10-25 PROCEDURE — 82948 REAGENT STRIP/BLOOD GLUCOSE: CPT

## 2017-10-25 PROCEDURE — 6370000000 HC RX 637 (ALT 250 FOR IP): Performed by: NURSE PRACTITIONER

## 2017-10-25 PROCEDURE — 2580000003 HC RX 258: Performed by: NURSE PRACTITIONER

## 2017-10-25 PROCEDURE — 87641 MR-STAPH DNA AMP PROBE: CPT

## 2017-10-25 RX ORDER — HYDRALAZINE HYDROCHLORIDE 10 MG/1
10 TABLET, FILM COATED ORAL 2 TIMES DAILY
Status: DISCONTINUED | OUTPATIENT
Start: 2017-10-25 | End: 2017-10-25

## 2017-10-25 RX ORDER — ACETAMINOPHEN 325 MG/1
650 TABLET ORAL EVERY 4 HOURS PRN
Status: DISCONTINUED | OUTPATIENT
Start: 2017-10-25 | End: 2017-10-26 | Stop reason: HOSPADM

## 2017-10-25 RX ORDER — CETIRIZINE HYDROCHLORIDE 10 MG/1
10 TABLET ORAL DAILY
Status: DISCONTINUED | OUTPATIENT
Start: 2017-10-25 | End: 2017-10-26 | Stop reason: HOSPADM

## 2017-10-25 RX ORDER — ESOMEPRAZOLE MAGNESIUM 40 MG/1
40 FOR SUSPENSION ORAL DAILY
Status: DISCONTINUED | OUTPATIENT
Start: 2017-10-25 | End: 2017-10-25 | Stop reason: SDUPTHER

## 2017-10-25 RX ORDER — FAMOTIDINE 20 MG/1
20 TABLET, FILM COATED ORAL 2 TIMES DAILY
Status: DISCONTINUED | OUTPATIENT
Start: 2017-10-25 | End: 2017-10-26 | Stop reason: HOSPADM

## 2017-10-25 RX ORDER — ONDANSETRON 2 MG/ML
4 INJECTION INTRAMUSCULAR; INTRAVENOUS EVERY 6 HOURS PRN
Status: DISCONTINUED | OUTPATIENT
Start: 2017-10-25 | End: 2017-10-26 | Stop reason: HOSPADM

## 2017-10-25 RX ORDER — PRAVASTATIN SODIUM 40 MG
40 TABLET ORAL DAILY
Status: DISCONTINUED | OUTPATIENT
Start: 2017-10-25 | End: 2017-10-26 | Stop reason: HOSPADM

## 2017-10-25 RX ORDER — PANTOPRAZOLE SODIUM 40 MG/1
40 TABLET, DELAYED RELEASE ORAL
Status: DISCONTINUED | OUTPATIENT
Start: 2017-10-25 | End: 2017-10-26 | Stop reason: HOSPADM

## 2017-10-25 RX ORDER — AMLODIPINE BESYLATE 10 MG/1
10 TABLET ORAL DAILY
Status: DISCONTINUED | OUTPATIENT
Start: 2017-10-25 | End: 2017-10-26 | Stop reason: HOSPADM

## 2017-10-25 RX ORDER — INSULIN GLARGINE 100 [IU]/ML
50 INJECTION, SOLUTION SUBCUTANEOUS NIGHTLY
Status: DISCONTINUED | OUTPATIENT
Start: 2017-10-25 | End: 2017-10-26 | Stop reason: HOSPADM

## 2017-10-25 RX ORDER — DOCUSATE SODIUM 100 MG/1
100 CAPSULE, LIQUID FILLED ORAL 2 TIMES DAILY PRN
Status: DISCONTINUED | OUTPATIENT
Start: 2017-10-25 | End: 2017-10-26 | Stop reason: HOSPADM

## 2017-10-25 RX ORDER — SODIUM CHLORIDE 0.9 % (FLUSH) 0.9 %
10 SYRINGE (ML) INJECTION EVERY 12 HOURS SCHEDULED
Status: DISCONTINUED | OUTPATIENT
Start: 2017-10-25 | End: 2017-10-26 | Stop reason: HOSPADM

## 2017-10-25 RX ORDER — GABAPENTIN 300 MG/1
300 CAPSULE ORAL 3 TIMES DAILY
Status: DISCONTINUED | OUTPATIENT
Start: 2017-10-25 | End: 2017-10-26 | Stop reason: HOSPADM

## 2017-10-25 RX ORDER — GEMFIBROZIL 600 MG/1
600 TABLET, FILM COATED ORAL 2 TIMES DAILY
Status: DISCONTINUED | OUTPATIENT
Start: 2017-10-25 | End: 2017-10-26 | Stop reason: HOSPADM

## 2017-10-25 RX ORDER — RANITIDINE 150 MG/1
150 CAPSULE ORAL 2 TIMES DAILY
Status: DISCONTINUED | OUTPATIENT
Start: 2017-10-25 | End: 2017-10-25 | Stop reason: CLARIF

## 2017-10-25 RX ORDER — SODIUM CHLORIDE 0.9 % (FLUSH) 0.9 %
10 SYRINGE (ML) INJECTION PRN
Status: DISCONTINUED | OUTPATIENT
Start: 2017-10-25 | End: 2017-10-26 | Stop reason: HOSPADM

## 2017-10-25 RX ORDER — NITROGLYCERIN 0.4 MG/1
0.4 TABLET SUBLINGUAL EVERY 5 MIN PRN
Status: DISCONTINUED | OUTPATIENT
Start: 2017-10-25 | End: 2017-10-26 | Stop reason: HOSPADM

## 2017-10-25 RX ORDER — ALBUTEROL SULFATE 90 UG/1
2 AEROSOL, METERED RESPIRATORY (INHALATION) EVERY 6 HOURS PRN
Status: DISCONTINUED | OUTPATIENT
Start: 2017-10-25 | End: 2017-10-26 | Stop reason: HOSPADM

## 2017-10-25 RX ORDER — NICOTINE 21 MG/24HR
1 PATCH, TRANSDERMAL 24 HOURS TRANSDERMAL DAILY
Status: DISCONTINUED | OUTPATIENT
Start: 2017-10-25 | End: 2017-10-26 | Stop reason: HOSPADM

## 2017-10-25 RX ORDER — HYDROCHLOROTHIAZIDE 25 MG/1
25 TABLET ORAL DAILY
Status: DISCONTINUED | OUTPATIENT
Start: 2017-10-25 | End: 2017-10-26 | Stop reason: HOSPADM

## 2017-10-25 RX ORDER — DULOXETIN HYDROCHLORIDE 60 MG/1
60 CAPSULE, DELAYED RELEASE ORAL DAILY
Status: DISCONTINUED | OUTPATIENT
Start: 2017-10-25 | End: 2017-10-26 | Stop reason: HOSPADM

## 2017-10-25 RX ORDER — LORATADINE 10 MG/1
10 TABLET ORAL DAILY
Status: DISCONTINUED | OUTPATIENT
Start: 2017-10-25 | End: 2017-10-25 | Stop reason: CLARIF

## 2017-10-25 RX ORDER — LOSARTAN POTASSIUM 50 MG/1
50 TABLET ORAL 2 TIMES DAILY
Status: DISCONTINUED | OUTPATIENT
Start: 2017-10-25 | End: 2017-10-26 | Stop reason: HOSPADM

## 2017-10-25 RX ORDER — POLYETHYLENE GLYCOL 3350 17 G/17G
17 POWDER, FOR SOLUTION ORAL DAILY
Status: DISCONTINUED | OUTPATIENT
Start: 2017-10-25 | End: 2017-10-26 | Stop reason: HOSPADM

## 2017-10-25 RX ORDER — GABAPENTIN 300 MG/1
300 CAPSULE ORAL 3 TIMES DAILY
Status: DISCONTINUED | OUTPATIENT
Start: 2017-10-25 | End: 2017-10-25

## 2017-10-25 RX ORDER — HYDRALAZINE HYDROCHLORIDE 25 MG/1
25 TABLET, FILM COATED ORAL 2 TIMES DAILY
Status: DISCONTINUED | OUTPATIENT
Start: 2017-10-25 | End: 2017-10-26 | Stop reason: HOSPADM

## 2017-10-25 RX ORDER — METOPROLOL TARTRATE 50 MG/1
50 TABLET, FILM COATED ORAL 2 TIMES DAILY
Status: DISCONTINUED | OUTPATIENT
Start: 2017-10-25 | End: 2017-10-26 | Stop reason: HOSPADM

## 2017-10-25 RX ADMIN — Medication 10 ML: at 21:33

## 2017-10-25 RX ADMIN — LABETALOL HYDROCHLORIDE 0.5 MG/MIN: 5 INJECTION, SOLUTION INTRAVENOUS at 03:15

## 2017-10-25 RX ADMIN — ASPIRIN 325 MG: 325 TABLET, DELAYED RELEASE ORAL at 08:50

## 2017-10-25 RX ADMIN — METOPROLOL TARTRATE 50 MG: 50 TABLET, FILM COATED ORAL at 21:27

## 2017-10-25 RX ADMIN — POLYETHYLENE GLYCOL 3350 17 G: 17 POWDER, FOR SOLUTION ORAL at 08:55

## 2017-10-25 RX ADMIN — INSULIN LISPRO 3 UNITS: 100 INJECTION, SOLUTION INTRAVENOUS; SUBCUTANEOUS at 17:19

## 2017-10-25 RX ADMIN — LOSARTAN POTASSIUM 50 MG: 50 TABLET, FILM COATED ORAL at 08:50

## 2017-10-25 RX ADMIN — Medication 3 UNITS: at 21:34

## 2017-10-25 RX ADMIN — PRAVASTATIN SODIUM 40 MG: 40 TABLET ORAL at 21:28

## 2017-10-25 RX ADMIN — HYDROCHLOROTHIAZIDE 25 MG: 25 TABLET ORAL at 08:50

## 2017-10-25 RX ADMIN — LOSARTAN POTASSIUM 50 MG: 50 TABLET, FILM COATED ORAL at 23:57

## 2017-10-25 RX ADMIN — INSULIN LISPRO 3 UNITS: 100 INJECTION, SOLUTION INTRAVENOUS; SUBCUTANEOUS at 08:50

## 2017-10-25 RX ADMIN — PANTOPRAZOLE SODIUM 40 MG: 40 TABLET, DELAYED RELEASE ORAL at 08:50

## 2017-10-25 RX ADMIN — GABAPENTIN 300 MG: 300 CAPSULE ORAL at 02:01

## 2017-10-25 RX ADMIN — HYDRALAZINE HYDROCHLORIDE 10 MG: 10 TABLET, FILM COATED ORAL at 08:50

## 2017-10-25 RX ADMIN — INSULIN GLARGINE 50 UNITS: 100 INJECTION, SOLUTION SUBCUTANEOUS at 21:33

## 2017-10-25 RX ADMIN — DULOXETINE HYDROCHLORIDE 60 MG: 60 CAPSULE, DELAYED RELEASE ORAL at 08:50

## 2017-10-25 RX ADMIN — AMLODIPINE BESYLATE 10 MG: 10 TABLET ORAL at 08:50

## 2017-10-25 RX ADMIN — GEMFIBROZIL 600 MG: 600 TABLET ORAL at 08:50

## 2017-10-25 RX ADMIN — METOPROLOL TARTRATE 50 MG: 50 TABLET, FILM COATED ORAL at 12:16

## 2017-10-25 RX ADMIN — HYDRALAZINE HYDROCHLORIDE 25 MG: 25 TABLET, FILM COATED ORAL at 21:32

## 2017-10-25 RX ADMIN — ENOXAPARIN SODIUM 40 MG: 40 INJECTION SUBCUTANEOUS at 12:16

## 2017-10-25 RX ADMIN — INSULIN LISPRO 3 UNITS: 100 INJECTION, SOLUTION INTRAVENOUS; SUBCUTANEOUS at 12:16

## 2017-10-25 RX ADMIN — FAMOTIDINE 20 MG: 20 TABLET, FILM COATED ORAL at 08:50

## 2017-10-25 RX ADMIN — Medication 2 PUFF: at 19:39

## 2017-10-25 RX ADMIN — CETIRIZINE HYDROCHLORIDE 10 MG: 10 TABLET ORAL at 08:50

## 2017-10-25 RX ADMIN — DOCUSATE SODIUM 100 MG: 100 CAPSULE ORAL at 21:28

## 2017-10-25 RX ADMIN — GABAPENTIN 300 MG: 300 CAPSULE ORAL at 14:00

## 2017-10-25 RX ADMIN — GABAPENTIN 300 MG: 300 CAPSULE ORAL at 21:28

## 2017-10-25 RX ADMIN — FAMOTIDINE 20 MG: 20 TABLET, FILM COATED ORAL at 01:36

## 2017-10-25 RX ADMIN — GABAPENTIN 300 MG: 300 CAPSULE ORAL at 08:50

## 2017-10-25 RX ADMIN — INSULIN GLARGINE 50 UNITS: 100 INJECTION, SOLUTION SUBCUTANEOUS at 01:34

## 2017-10-25 RX ADMIN — FAMOTIDINE 20 MG: 20 TABLET, FILM COATED ORAL at 21:27

## 2017-10-25 RX ADMIN — GEMFIBROZIL 600 MG: 600 TABLET ORAL at 21:27

## 2017-10-25 RX ADMIN — Medication 2 PUFF: at 08:17

## 2017-10-25 ASSESSMENT — PAIN SCALES - GENERAL
PAINLEVEL_OUTOF10: 0
PAINLEVEL_OUTOF10: 5
PAINLEVEL_OUTOF10: 0
PAINLEVEL_OUTOF10: 4

## 2017-10-25 ASSESSMENT — PAIN DESCRIPTION - DESCRIPTORS: DESCRIPTORS: ACHING

## 2017-10-25 ASSESSMENT — PAIN DESCRIPTION - LOCATION
LOCATION: ABDOMEN
LOCATION: ABDOMEN

## 2017-10-25 ASSESSMENT — PAIN DESCRIPTION - PAIN TYPE: TYPE: ACUTE PAIN

## 2017-10-25 ASSESSMENT — PAIN DESCRIPTION - ORIENTATION
ORIENTATION: LEFT
ORIENTATION: LEFT

## 2017-10-25 NOTE — ED PROVIDER NOTES
[facility]  eMERGENCY dEPARTMENT eNCOUnter          [address] [facility]       Chief Complaint   Patient presents with    Abdominal Pain    Emesis    Flank Pain       Nurses Notes reviewed and I agree except as noted in the HPI. HISTORY OF PRESENT ILLNESS    Cristina Parmar is a 49 y.o. male who presents to the Emergency Department for the evaluation of abdominal pain. The patient reports that his abdominal pain began three days ago without known sick contact or trauma and has gradually worsened with time. The pain is located in his mid abdomen with radiation into his left flank. His pain is described as intermittent discomfort that he currently rates as an 8/10 in severity and states improves with belching. Patient also complains of nausea and three episodes of emesis. He adds that he was constipated for the past three days, but had a normal bowel movement this afternoon without difficulty. The patient denies any fever, chills, chest pain, shortness of breath, hematemesis, diarrhea, hematuria, dysuria, or increased urinary frequency or urgency. Patient has a past medical history of diabetes for which he takes 52 units of Lantus nightly as well as Humalog with meals. He does admit that his blood glucose is not controlled and has been running in the ranges of 300-400. The patient adds that he has been increasingly thirsty in the past couple of days. Patient's diabetes is managed by his PCP as he does not follow with endocrinology. No further complaints at initial time of encounter. Location/Symptom: abdominal pain  Timing/Onset: acute  Context/Setting: NKI or sick contact  Quality: discomfort  Duration: 3 days  Modifying Factors: relieved by belching  Severity: 8/10    The HPI was provided by the patient. REVIEW OF SYSTEMS     Review of Systems   Constitutional: Negative for chills, diaphoresis and fever. HENT: Negative for congestion. Eyes: Negative for discharge.    Respiratory: Negative for cough, shortness of breath and wheezing. Cardiovascular: Negative for chest pain. Gastrointestinal: Positive for abdominal pain and nausea. Negative for blood in stool and vomiting. Frequent heartburn    2 days of constipation but did move the bowels today which was formed, normal consistency, no bleeding  Passage of stool did not affect his abdominal pain     Genitourinary: Positive for flank pain. Negative for dysuria and testicular pain. Urine was dark at urgent care today    Left flank discomfort   Musculoskeletal: Negative for joint swelling. Skin: Negative for rash. Neurological: Negative for weakness and numbness. Hematological: Negative for adenopathy. Psychiatric/Behavioral: Negative for confusion. PAST MEDICAL HISTORY    has a past medical history of Abscess of leg; Allergic rhinitis; Asthma; GERD (gastroesophageal reflux disease); Headache(784.0); Hyperlipidemia; Hypertension; Neuropathy ([address] Osteoarthritis; Other disorders of kidney and ureter; and Type II or unspecified type diabetes mellitus without mention of complication, not stated as uncontrolled. SURGICAL HISTORY      has a past surgical history that includes Foot surgery (Right, 2008); Hand surgery (Left, 2010); Cardiac catheterization (2007); and Appendectomy (5/31/12). CURRENT MEDICATIONS       Previous Medications    ALBUTEROL (PROVENTIL HFA) 108 (90 BASE) MCG/ACT INHALER    Inhale 2 puffs into the lungs every 6 hours as needed for Wheezing    ALBUTEROL (PROVENTIL) (5 MG/ML) 0.5% NEBULIZER SOLUTION    Take 1 mL by nebulization 4 times daily as needed for Wheezing    ALCOHOL SWABS 70 % PADS    USE AS DIRECTED    AMLODIPINE (NORVASC) 10 MG TABLET    Take 1 tablet by mouth daily    ASPIRIN 325 MG EC TABLET    Take 1 tablet by mouth daily. BLOOD PRESSURE MONITORING (BLOOD PRESSURE CUFF) MISC    1 Device by Does not apply route 2 times daily.     DULOXETINE (CYMBALTA) 60 MG CAPSULE    TAKE ONE CAPSULE BY MOUTH DAILY    ESOMEPRAZOLE MAGNESIUM (NEXIUM PO)    Take by mouth    FLUTICASONE-SALMETEROL (ADVAIR DISKUS) 250-50 MCG/DOSE AEPB    Inhale 1 puff into the lungs every 12 hours    GABAPENTIN (NEURONTIN) 300 MG CAPSULE    Take 1 capsule by mouth daily    GEMFIBROZIL (LOPID) 600 MG TABLET    TAKE 1 TABLET BY MOUTH TWICE DAILY    GLUCOSE ([facility] GLUCOSE) 4 G CHEWABLE TABLET    Take 4 tablets by mouth as needed for Low blood sugar. GLUCOSE BLOOD VI TEST STRIPS (FREESTYLE TEST STRIPS) STRIP    Test TID dx code E11.9    GLUCOSE MONITORING KIT (FREESTYLE) MONITORING KIT    1 kit by Does not apply route daily as needed. GLUCOSE MONITORING KIT (FREESTYLE) MONITORING KIT    1 kit by Does not apply route daily as needed    HYDRALAZINE (APRESOLINE) 10 MG TABLET    TAKE ONE TABLET BY MOUTH TWICE DAILY    HYDROCHLOROTHIAZIDE (HYDRODIURIL) 25 MG TABLET    Take 1 tablet by mouth daily    INSULIN GLARGINE (LANTUS) 100 UNIT/ML INJECTION VIAL    Inject 50 Units into the skin nightly    INSULIN LISPRO (HUMALOG) 100 UNIT/ML INJECTION VIAL    Inject 40 Units into the skin 3 times daily (before meals) As needed    INSULIN SYRINGE-NEEDLE U-100 30G X 1/2\" 0.5 ML MISC    1 each by Does not apply route 3 times daily. LORATADINE (CLARITIN) 10 MG TABLET    Take 1 tablet by mouth daily    LOSARTAN (COZAAR) 50 MG TABLET    Take 1 tablet by mouth 2 times daily    METFORMIN (GLUCOPHAGE) 1000 MG TABLET    TAKE 1 TABLET BY MOUTH TWICE DAILY WITH MEALS    METOPROLOL TARTRATE (LOPRESSOR) 50 MG TABLET    Take 1 tablet by mouth 2 times daily    NICOTINE (NICODERM CQ) 21 MG/24HR    Place 1 patch onto the skin daily    NITROGLYCERIN (NITROSTAT) 0.4 MG SL TABLET    Place 1 tablet under the tongue every 5 minutes as needed for Chest pain.     OMEPRAZOLE (PRILOSEC) 20 MG DELAYED RELEASE CAPSULE    TAKE ONE CAPSULE BY MOUTH EVERY DAY    PRAVASTATIN (PRAVACHOL) 40 MG TABLET    Take 1 tablet by mouth daily    RANITIDINE (ZANTAC) 150 MG reviewed. DIFFERENTIAL DIAGNOSIS:   Accelerated hypertension    Diabetic evaluate for silent MI/cardiac ischemia    Abdominal discomfort, some left flank pain considered kidney stone, diverticulitis  Vascular disease intra-abdominal          DIAGNOSTIC RESULTS     EKG: All EKG's are interpreted by the Emergency Department Physician who either signs or Co-signs this chart in the absence of a cardiologist.    EKG showed sinus rhythm with rate 99. QRS complexes show normal axis, normal conduction. ST-T waves show no acute change        RADIOLOGY: non-plain film images(s) such as CT, Ultrasound and MRI are read by the radiologist.      Chest x-ray, two-view, shows lungs clear normal heart and mediastinum. No evidence of basilar pneumonia. Normal study per my interpretation    CT scan of the abdomen and pelvis revealed Diverticulitis is with no diverticulitis. Kidneys are normal with no renal obstruction. Vascular structures appear normal.  No aneurysm. Per radiology reading    CT ABDOMEN PELVIS WO IV CONTRAST Additional Contrast? None   Final Result   1. No hydronephrosis, hydroureter or urolithiasis. 2. No acute intra-abdominal or pelvic findings. 3. Uncomplicated sigmoid colon diverticulosis. Final report electronically signed by Dr. Lor Alcala on 10/24/2017 9:46 PM      XR CHEST STANDARD (2 VW)   Final Result   No acute intrathoracic process. **This report has been created using voice recognition software. It may contain minor errors which are inherent in voice recognition technology. **      Final report electronically signed by Dr. Lor Alcala on 10/24/2017 8:55 PM        LABS:   Labs Reviewed   UA W/O MICROSCOPIC, REFLEX C & S - Abnormal; Notable for the following:        Result Value    Glucose, Urine 100 (*)     Bilirubin Urine Small (*)     Ketones, Urine 15 (*)     Protein,  (*)     Color, UA Dark yellow (*)     All other components within normal limits   CBC WITH AUTO DIFFERENTIAL - Abnormal; Notable for the following:     WBC 13.0 (*)     Segs Absolute 10.7 (*)     All other components within normal limits   BASIC METABOLIC PANEL - Abnormal; Notable for the following:     Chloride 97 (*)     Glucose 191 (*)     All other components within normal limits   BLOOD GAS, ARTERIAL - Abnormal; Notable for the following:     PO2 59 (*)     All other components within normal limits   GLOMERULAR FILTRATION RATE, ESTIMATED - Abnormal; Notable for the following:     Est, Glom Filt Rate 79 (*)     All other components within normal limits   HEPATIC FUNCTION PANEL   LIPASE   TROPONIN   BETA-HYDROXYBUTYRATE   ANION GAP   OSMOLALITY       EMERGENCY DEPARTMENT COURSE:   Vitals:    Vitals:    10/24/17 2035 10/24/17 2036 10/24/17 2106 10/24/17 2115   BP:  (!) 217/137  (!) 214/128   Pulse: 104   101   Resp: 19  18 20   Temp:       TempSrc:       SpO2: 95%   96%   Weight:       Height:         8:27 PM: The patient was seen and evaluated. Nursing notes reviewed    IV fluids    The blood pressure was running 217/137    Given labetalol 20 mg IV    Blood pressure down to 205/115    Start on labetalol infusion    Blood sugar was 191    Not acidotic    Ketones negative    Admit for hypertensive urgency    CRITICAL CARE:   None. CONSULTS:  Dr. Zacarias Serve:  None. FINAL IMPRESSION      1. Ketonuria    2. Proteinuria, unspecified type    3. Left upper quadrant pain    4. Bilirubinuria    5. Hypertensive urgency    6.  Uncontrolled type 2 diabetes mellitus with complication, with long-term current use of insulin [facility])          DISPOSITION/PLAN   Admit        PATIENT REFERRED TO:  [facility] [address] [id number]  Go today        DISCHARGE MEDICATIONS:  New Prescriptions    No medications on file       (Please note that portions of this note were completed with a voice recognition program.  Efforts were made to edit the dictations but occasionally words are mis-transcribed.)    Scribe:  [id number] KAT Nesbitt 10/24/17 8:27 PM Scribing for and in the presence of Anel Castillo MD.    Signed by: [id number] Davide Valladares, 10/24/17 9:57 PM    Provider:  I personally performed the services described in the documentation, reviewed and edited the documentation which was dictated to the scribe in my presence, and it accurately records my words and actions.     Anel Castillo MD 10/24/17 9:57 PM              Anel Castillo MD  10/24/17 2527

## 2017-10-25 NOTE — CARE COORDINATION
10/25/17, 11:51 AM  Thuy Solis       Admitted from: Ed 10/24/2017/ Zoiechaneljuan jcarlos day: 1   Location: [address] [place] [address]A Reason for admit: Hypertensive urgency [I16.0] Status: inpt  Admit order signed?: yes  PMH:  has a past medical history of Abscess of leg; Allergic rhinitis; Asthma; Blood circulation, collateral; GERD (gastroesophageal reflux disease); Headache(784.0); Hyperlipidemia; Hypertension; Neuropathy ([address] Osteoarthritis; Other disorders of kidney and ureter; Pneumonia; and Type II or unspecified type diabetes mellitus without mention of complication, not stated as uncontrolled. Medications:  Scheduled Meds:   amLODIPine  10 mg Oral Daily    aspirin  325 mg Oral Daily    DULoxetine  60 mg Oral Daily    mometasone-formoterol  2 puff Inhalation BID    gemfibrozil  600 mg Oral BID    hydrALAZINE  10 mg Oral BID    hydrochlorothiazide  25 mg Oral Daily    insulin glargine  50 Units Subcutaneous Nightly    losartan  50 mg Oral BID    nicotine  1 patch Transdermal Daily    pantoprazole  40 mg Oral QAM AC    pravastatin  40 mg Oral Daily    sodium chloride flush  10 mL Intravenous 2 times per day    enoxaparin  40 mg Subcutaneous Daily    insulin lispro  0-18 Units Subcutaneous TID WC    insulin lispro  0-9 Units Subcutaneous Nightly    polyethylene glycol  17 g Oral Daily    cetirizine  10 mg Oral Daily    famotidine  20 mg Oral BID    gabapentin  300 mg Oral TID    metoprolol tartrate  50 mg Oral BID     Continuous Infusions:   sodium chloride 50 mL/hr at 10/24/17 2351    labetalol (NORMODYNE) 1 mg/mL infusion Stopped (10/25/17 0850)      Pertinent Info/Orders/Treatment Plan: Pt admitted with hypertension, /118 and elevated blood sugar. Labetalol gtt stopped, /71 today. GI consulted and has signed off. Pt in isolation for hx of MRSA in neck abscess.    Diet: DIET CARB CONTROL; Carb Control: 4 carbs/meal (approximate 1800 kcals/day)   Vital Signs: /71   Pulse 104

## 2017-10-25 NOTE — H&P
Hospitalist  History and Physical    Patient:  Wilfred Lawson  MRN: [id number]    CHIEF COMPLAINT:  Left upper quadrant abdominal pain    History Obtained From:  patient, family member - wife, electronic medical record  PCP: Pamela Paige    HISTORY OF PRESENT ILLNESS:   The patient is a 49 y.o. male who presents to the Emergency Department for the evaluation of abdominal pain. Wilfred Lawson has a significant history of   CCATH 2012-normal coronaries, ECHO 7/2017 LVEF 55%,, DMT2 uncontrolled, HgbA1c 7/2017 10.7, Noncompliant with home monitoring. Essential HPTN, Peripheral Neuropathy, Cigar use, Hypertriglyceridemia. Noted patient was seen in the Urgent Care 10/6/2017 Right rib contusion. Blood Pressure was noted to be over 200, with noted noncompliance with medications. Script for Lopressor was given. Patient tells me has PCP appointment within the upcoming month at [facility]. Tonight patient initially presented to Urgent Care and was transferred to the ER. The patient reports that his abdominal pain began three days ago without known sick contact or trauma and has gradually worsened with time. The pain is located in his mid abdomen with radiation into his left flank. His pain is described as intermittent discomfort that he currently rates as an 8/10 in severity and states improves with belching. Patient also complains of nausea and three episodes of emesis earlier today. He adds that he was constipated for the past three days, but had a normal bowel movement this afternoon without difficulty. Abdominal distention was noted per patient and family as well. Abdominal pain is Aggravated with movement and palpation. No guarding noted. Alleviated with certain positions and belching. Patient tells me pain has gotten better on its own currently 3-4/10.     The patient denies any fever, chills, chest pain, shortness of breath, hematemesis, diarrhea, hematuria, dysuria, or increased urinary frequency or urgency. Past Medical History:        Diagnosis Date    Abscess of leg     Allergic rhinitis     Asthma     GERD (gastroesophageal reflux disease)     Headache(784.0)     Hyperlipidemia     Hypertension     Neuropathy ([address]     Osteoarthritis     Other disorders of kidney and ureter     Type II or unspecified type diabetes mellitus without mention of complication, not stated as uncontrolled        Past Surgical History:        Procedure Laterality Date    APPENDECTOMY  5/31/12    bree Dc    CARDIAC CATHETERIZATION  2007    WNL    FOOT SURGERY Right 2008    Rt leg    HAND SURGERY Left 2010       Medications Prior to Admission:    Prior to Admission medications    Medication Sig Start Date End Date Taking?  Authorizing Provider   Esomeprazole Magnesium (NEXIUM PO) Take by mouth   Yes Historical Provider, MD   metoprolol tartrate (LOPRESSOR) 50 MG tablet Take 1 tablet by mouth 2 times daily 10/6/17  Yes Beck Stakr NP   amLODIPine (NORVASC) 10 MG tablet Take 1 tablet by mouth daily 7/12/17  Yes Cynthia King MD   losartan (COZAAR) 50 MG tablet Take 1 tablet by mouth 2 times daily 7/12/17  Yes Jeannie Shah MD   hydrochlorothiazide (HYDRODIURIL) 25 MG tablet Take 1 tablet by mouth daily 7/12/17  Yes Jeannie Shah MD   metFORMIN (GLUCOPHAGE) 1000 MG tablet TAKE 1 TABLET BY MOUTH TWICE DAILY WITH MEALS 11/7/16  Yes Shantel Helms CNP   gemfibrozil (LOPID) 600 MG tablet TAKE 1 TABLET BY MOUTH TWICE DAILY 7/19/16  Yes Lino Almendarez MD   DULoxetine (CYMBALTA) 60 MG capsule TAKE ONE CAPSULE BY MOUTH DAILY 5/3/16  Yes Lino Almendarez MD   hydrALAZINE (APRESOLINE) 10 MG tablet TAKE ONE TABLET BY MOUTH TWICE DAILY 4/11/16  Yes Lino Almendarez MD   insulin lispro (HUMALOG) 100 UNIT/ML injection vial Inject 40 Units into the skin 3 times daily (before meals) As needed 4/7/16  Yes Lino Almendarez MD   insulin glargine needed for Low blood sugar. 4/17/14   Tex Ramos CNP   glucose monitoring kit (FREESTYLE) monitoring kit 1 kit by Does not apply route daily as needed. 4/17/14   Tex Ramos CNP   [facility] Ultra Thin Lancets MISC 1 Device by Does not apply route daily. 4/17/14   Tex Ramos CNP   nitroGLYCERIN (NITROSTAT) 0.4 MG SL tablet Place 1 tablet under the tongue every 5 minutes as needed for Chest pain. 9/24/12   Marva Found,        Allergies:  Cephalexin; Januvia [sitagliptin]; Naproxen; and Quinapril hcl    Social History:   TOBACCO:   reports that he has been smoking Cigars. He has never used smokeless tobacco.  ETOH:   reports that he does not drink alcohol. OCCUPATION:  , lives with wife    Family History:       Problem Relation Age of Onset    High Blood Pressure Mother     Heart Disease Father        REVIEW OF SYSTEMS:  Constitutional: Negative for chills, diaphoresis and fever. HENT: Negative for congestion. Eyes: Negative for discharge. Respiratory: Positive for occasional nonproductive cough, Negative for shortness of breath and wheezing. Cardiovascular: Negative for chest pain, pressure, heaviness or lower leg edema. .   Gastrointestinal: Positive for left upper quadrant abdominal pain and nausea and vomiting. Negative for blood in stool and Positive for frequent belching and burping, abdominal distention and constipation. Genitourinary: Positive for left flank pain. Negative for dysuria and testicular pain. Musculoskeletal: Negative for joint swelling. Positive for chronic lower back pain. Skin: Negative for rash. Neurological: Negative for weakness and numbness. Positive for peripheral neuropathy  Hematological: Negative for adenopathy. Psychiatric/Behavioral: Negative for confusion.      Physical Exam:    Vitals: BP (!) 189/107   Pulse 94   Temp 97.7 °F (36.5 °C) (Oral)   Resp 18   Ht 5' 11\" (1.803 m)   Wt 209 lb (94.8 kg)   SpO2 93%   BMI 29.15 kg/m²   General intrathoracic process. EKG: NSR, no R wave progression is noted across the precordial leads. No acute ST or T wave abnormality. Assessment and Plan   1. Hypertensive Urgency, ?secondary to medication compliance. Patient is refusing this, however he does not know his medications and relies on his wife. PCP hs been established with [facility]. Patient has been complaining of LUQ abdominal pain, which may be contributing. Labetalol gtt was started while in the ER. Will continue. Home regimen has been restarted. EKG no acute, Troponin-negative. ECHO was last completed 7/2017. Urine toxicology is pending. 2. LUQ abdominal pain, ongoing for 3+ days, with noted leukocytosis. CT of ABD/PELVIS-no acute. Patient has had complaints of constipation? Contributing. Lipase was nml. Lactic Acid nml. Complaints of belching, vomiting and abdominal distention. No history of EGD or Colonoscopy. GI has been consulted. Miralax has been added, home regimen of Nexium, Prilosec and Zantac have been continued. 3. Hypoxia, ABG room Air pO2=59. ?COPD, not diagnosed. DDimer was nml. Chest Xray no acute. Patient is a daily Cigar smoking. Smoking cessation education was given, Habitrol patch will be ordered. 4. Leukocytosis, no SIRS. Looking back mildly elevated in 7/2017. ?may need to be further evaluated as outpatient. PCT is pending. Urine negative for leukocytes and nitrates. 5. DMT2 uncontrolled, with complications to peripheral neuropathy. Patient poor compliance with home monitoring and taking medications. Patient tells me when he does check blood glucose is typically over 300. Home regimen has been restarted, ACCU and SSI. 6. Hypertriglyceridemia, Lopid was restarted. 7. Overweight, BMI 29.2  8. Nicotine abuse, Cigars, daily. 9. GERD, history. 10. Essential HPTN, history. Home regimen Norvasc, Apresoline, HCTZ, Losartan, Lopressor, restarted with parameters to hold.      Full code    Patient

## 2017-10-25 NOTE — CONSULTS
191*     Hepatic:   Recent Labs      10/24/17   2037   ALKPHOS  100   ALT  17   AST  15   PROT  7.6   BILITOT  0.8   BILIDIR  <0.2   LABALBU  4.6     Amylase and Lipase:  Recent Labs      10/24/17   2037   LIPASE  41.2     Lactic Acid:   Recent Labs      10/24/17   2243   LACTA  1.7     Calcium:  Recent Labs      10/24/17   2037   CALCIUM  9.7     Ionized Calcium:No results for input(s): Suzanna [place] in the last 72 hours. Magnesium:No results for input(s): MG in the last 72 hours. Phosphorus:No results for input(s): PHOS in the last 72 hours. Troponin: No results for input(s): CKTOTAL, CKMB, TROPONINI in the last 72 hours. PT/INR:   No results for input(s): PROTIME, INR in the last 72 hours. REVIEW OF SYSTEMS:    GENERAL:  No fever, chills or weight loss. EYES:  No  blurred vision, double vision, glaucoma, pain on exposure to light. CARDIOVASCULAR:  No chest pain or palpitations. RESPIRATORY:  No dyspnea or cough. GI: had pain and n/v  MUSCULOSKELETAL:  No new painful or swollen joints or myalgias. :   No dysuria or hematuria. SKIN:  No rashes or jaundice. NEUROLOGIC:   No headaches or  seizures,  numbness or tingling of arms, or legs. PSYCH:  No anxiety or depression. ENDOCRINE:   No polyuria or polydipsia. BLOOD:  No anemia, bleeding disorder, blood or blood product transfusion. PHYSICAL EXAMINATION:      Vitals:    10/25/17 0803   BP:    Pulse:    Resp:    Temp: 98.3 °F (36.8 °C)   SpO2:      GEN: Well nourished, well developed. LUNGS:  Clear to auscultation bilaterally. Chest rises equally on inspiration. CARDIOVASCULAR:  Regular rate and rhythm without murmurs, rubs or gallops. ABDOMEN:  Soft, nontender and nondistended with normal bowel sounds. EYES: MATEUS. Extraoccular motions intact bilaterally. ENT:  Ears symmetric, Neck supple, trachea midline, moist mucous membranes     EXTREMITIES:  No cyanosis, clubbing, or edema. DERM:  No rash or jaundice.     NEURO: Michel Baltimore, CNP   fluticasone-salmeterol (ADVAIR DISKUS) 250-50 MCG/DOSE AEPB Inhale 1 puff into the lungs every 12 hours 10/6/15  Yes Jodie Haywood CNP   aspirin 325 MG EC tablet Take 1 tablet by mouth daily. 4/6/15  Yes Jodie Haywood CNP   nicotine (NICODERM CQ) 21 MG/24HR Place 1 patch onto the skin daily 7/12/17   Jb Gao MD   omeprazole (PRILOSEC) 20 MG delayed release capsule TAKE ONE CAPSULE BY MOUTH EVERY DAY 10/11/16   Jodie Haywood CNP   ranitidine (ZANTAC) 150 MG capsule TAKE 1 CAPSULE BY MOUTH TWICE DAILY 9/13/16   Jodie Haywood CNP   glucose monitoring kit (FREESTYLE) monitoring kit 1 kit by Does not apply route daily as needed 3/30/16   Benedict Frost MD   Alcohol Swabs 70 % PADS USE AS DIRECTED 3/14/16   Jodie Haywood CNP   albuterol (PROVENTIL) (5 MG/ML) 0.5% nebulizer solution Take 1 mL by nebulization 4 times daily as needed for Wheezing 10/6/15   Jodie Haywood CNP   albuterol (PROVENTIL HFA) 108 (90 BASE) MCG/ACT inhaler Inhale 2 puffs into the lungs every 6 hours as needed for Wheezing 10/6/15 10/5/16  Jodie Haywood CNP   Insulin Syringe-Needle U-100 30G X 1/2\" 0.5 ML MISC 1 each by Does not apply route 3 times daily. 4/6/15   Jodie Haywood CNP   Blood Pressure Monitoring (BLOOD PRESSURE CUFF) MISC 1 Device by Does not apply route 2 times daily. 4/17/14   Jodie Haywood CNP   glucose [facility] GLUCOSE) 4 G chewable tablet Take 4 tablets by mouth as needed for Low blood sugar. 4/17/14   Jodie Haywood CNP   glucose monitoring kit (FREESTYLE) monitoring kit 1 kit by Does not apply route daily as needed. 4/17/14   Jodie Haywood CNP   [facility] Ultra Thin Lancets MISC 1 Device by Does not apply route daily. 4/17/14   Jodie Haywood, CNP   nitroGLYCERIN (NITROSTAT) 0.4 MG SL tablet Place 1 tablet under the tongue every 5 minutes as needed for Chest pain.  9/24/12   Sarojconner Baptiste, DO       MEDICATIONS    Scheduled Meds:   amLODIPine  10 mg Oral Daily    aspirin  325 mg Oral Daily    DULoxetine  60 mg Oral Daily    mometasone-formoterol  2 puff Inhalation BID    gemfibrozil  600 mg Oral BID    hydrALAZINE  10 mg Oral BID    hydrochlorothiazide  25 mg Oral Daily    insulin glargine  50 Units Subcutaneous Nightly    losartan  50 mg Oral BID    nicotine  1 patch Transdermal Daily    pantoprazole  40 mg Oral QAM AC    pravastatin  40 mg Oral Daily    sodium chloride flush  10 mL Intravenous 2 times per day    enoxaparin  40 mg Subcutaneous Daily    insulin lispro  0-18 Units Subcutaneous TID WC    insulin lispro  0-9 Units Subcutaneous Nightly    polyethylene glycol  17 g Oral Daily    cetirizine  10 mg Oral Daily    famotidine  20 mg Oral BID    gabapentin  300 mg Oral TID     Continuous Infusions:   sodium chloride 50 mL/hr at 10/24/17 2351    labetalol (NORMODYNE) 1 mg/mL infusion 0.5 mg/min (10/25/17 0315)     PRN Meds:.albuterol sulfate HFA, nitroGLYCERIN, sodium chloride flush, acetaminophen, docusate sodium, ondansetron    ALLERGIES   is allergic to cephalexin; januvia [sitagliptin]; naproxen; and quinapril hcl. SOCIAL HISTORY    Social History  Social History     Social History    Marital status:      Spouse name: N/A    Number of children: N/A    Years of education: N/A     Social History Main Topics    Smoking status: Current Some Day Smoker     Types: Cigars     Last attempt to quit: 10/18/2004    Smokeless tobacco: Never Used      Comment: 5 cigars per day    Alcohol use No      Comment: occassionally    Drug use: No    Sexual activity: Yes     Partners: Female     Other Topics Concern    None     Social History Narrative    None       FAMILY HISTORY    family history includes Heart Disease in his father; High Blood Pressure in his mother. REVIEW OF DIAGNOSTIC TESTING AND LABS:        Hospitalist/Attending provider notes, consulting physician notes, laboratory results and procedure notes reviewed prior to seeing the patient. Note done in collaboration with DR Sonali Wilson MD  Electronically signed by Catrachito Stauffer CNP on 10/25/17 at 8:36 AM

## 2017-10-25 NOTE — PROGRESS NOTES
Transfer instructions reviewed with pt, instructed pt to go directly to main er and to not eat or drink on the way there. Pt verbalizes understanding, ambulatory to lobby in stable condition with wife.

## 2017-10-25 NOTE — PROGRESS NOTES
Normocephalic, atraumatic  Eyes: EOMI, no scleral icterus  CVS: regular rate and rhythm, Normal S1S2  Pulm: Clear to auscultation bilaterally. No crackles/wheezes  Gastrointestinal: Soft, nontender, nondistended, no guarding or rebound  Extremities: No edema. No erythema or warmth  Neuro: No gross focal deficits noted  Skin: Warm, dry    Labs:   Recent Labs      10/24/17   2037   WBC  13.0*   HGB  17.4   HCT  49.3   PLT  171     Recent Labs      10/24/17   2037   NA  138   K  4.5   CL  97*   CO2  28   BUN  11   CREATININE  1.0   CALCIUM  9.7     Recent Labs      10/24/17   2037   AST  15   ALT  17   BILIDIR  <0.2   BILITOT  0.8   ALKPHOS  100     No results for input(s): INR in the last 72 hours. No results for input(s): Ramos Pitch in the last 72 hours. Urinalysis:    Lab Results   Component Value Date    NITRU NEGATIVE 07/10/2017    WBCUA 0-2 07/10/2017    WBCUA 0-5 01/25/2017    BACTERIA NONE 07/10/2017    RBCUA NONE SEEN 07/10/2017    BLOODU Negative 10/24/2017    SPECGRAV 1.025 10/24/2017    GLUCOSEU >= 1000 07/10/2017       Radiology:  Ct Abdomen Pelvis Wo Iv Contrast Additional Contrast? None    Result Date: 10/24/2017  PROCEDURE: CT ABDOMEN PELVIS WO IV CONTRAST CLINICAL INFORMATION: Left flank and abdominal pain TECHNIQUE: CT of the abdomen and pelvis was performed without use of oral or intravenous contrast following the renal colic protocol. Axial images as well as coronal and sagittal reconstructions were obtained. All CT scans at this facility use dose modulation, iterative reconstruction, and/or weight-based dosing when appropriate to reduce radiation dose to as low as reasonably achievable. COMPARISON: CT abdomen and pelvis 5/31/2012 FINDINGS: There is no hydronephrosis, hydroureter or urolithiasis. Minimal bilateral perinephric stranding is likely chronic. There is scarring or atelectasis at the bilateral lung bases. No pleural effusion is identified. A small hiatal hernia is noted.  There voice recognition software. It may contain minor errors which are inherent in voice recognition technology. ** Final report electronically signed by Dr. Jelena Sinclair on 10/6/2017 6:25 PM      Diet: DIET CARB CONTROL; Carb Control: 4 carbs/meal (approximate 1800 kcals/day)     Disposition:    [] Home       [] TCU       [] Rehab       [] Psych       [] SNF       [] [place]       [] Other-    Code Status: Full Code    Assessment/Plan:    Active Hospital Problems    Diagnosis Date Noted    Hypertensive urgency [I16.0] 10/24/2017    Left upper quadrant pain [R10.12] 10/24/2017    Leukocytosis [D72.829] 10/24/2017    Essential hypertension [I10] 10/24/2017    Uncontrolled type 2 diabetes mellitus with hyperglycemia, with long-term current use of insulin ([facility] [address] [E11.65, Z79.4] 07/10/2017    Tobacco abuse [Z72.0] 07/10/2017    History of gastroesophageal reflux (GERD) [Z87.19] 08/12/2011    High triglycerides [E78.1] 08/12/2011     Hypertensive urgency vs emergency. Clinically improving. Possibly 2/2 med non-complaince. Uncontrolled hyperglycemia  L periumbilical pain. Now resolved.   PMH of DM2, hld, obesity, tobacco abuse, GERD    Titrate oral BP regimen  Monitor BP closely  24 insulin load and retitrate home regimen  Consulted on importance of compliance    Electronically signed by Roly Esparza MD on 10/25/2017 at 3:28 PM

## 2017-10-25 NOTE — PLAN OF CARE
Problem: Falls - Risk of  Goal: Absence of falls  Outcome: Ongoing  Fall risk assessment completed and interventions in place. Bed alarm on, side rails up x2, and call light within reach. Wrist band on and falling star posted. Will continue to monitor. Problem: Pain:  Goal: Pain level will decrease  Pain level will decrease   Outcome: Ongoing  Patient denies pain or discomfort. Problem: Discharge Planning:  Goal: Participates in care planning  Participates in care planning  Outcome: Ongoing  Care plan reviewed with patient and spouse. Patient and spouse verbalize understanding of the plan of care and contribute to goal setting. Goal: Discharged to appropriate level of care  Discharged to appropriate level of care  Outcome: Ongoing  Patient admitted from home and plans to return at time of discharge.

## 2017-10-25 NOTE — ED NOTES
Pt resting quietly in room no needs expressed. Side rails up x2 with call light in reach. Will continue to monitor.   Updated pt on [address] Gautam, RN  10/24/17 5288

## 2017-10-26 VITALS
OXYGEN SATURATION: 96 % | BODY MASS INDEX: 29.05 KG/M2 | HEART RATE: 85 BPM | SYSTOLIC BLOOD PRESSURE: 154 MMHG | DIASTOLIC BLOOD PRESSURE: 95 MMHG | TEMPERATURE: 97.4 F | RESPIRATION RATE: 14 BRPM | WEIGHT: 207.5 LBS | HEIGHT: 71 IN

## 2017-10-26 LAB
ANION GAP SERPL CALCULATED.3IONS-SCNC: 12 MEQ/L (ref 8–16)
BUN BLDV-MCNC: 10 MG/DL (ref 7–22)
CALCIUM SERPL-MCNC: 9.1 MG/DL (ref 8.5–10.5)
CHLORIDE BLD-SCNC: 100 MEQ/L (ref 98–111)
CO2: 25 MEQ/L (ref 23–33)
CREAT SERPL-MCNC: 0.8 MG/DL (ref 0.4–1.2)
GFR SERPL CREATININE-BSD FRML MDRD: > 90 ML/MIN/1.73M2
GLUCOSE BLD-MCNC: 105 MG/DL (ref 70–108)
GLUCOSE BLD-MCNC: 106 MG/DL (ref 70–108)
HCT VFR BLD CALC: 41.4 % (ref 42–52)
HEMOGLOBIN: 15.3 GM/DL (ref 14–18)
MCH RBC QN AUTO: 30.2 PG (ref 27–31)
MCHC RBC AUTO-ENTMCNC: 36.9 GM/DL (ref 33–37)
MCV RBC AUTO: 81.8 FL (ref 80–94)
PDW BLD-RTO: 13.2 % (ref 11.5–14.5)
PLATELET # BLD: 153 THOU/MM3 (ref 130–400)
PMV BLD AUTO: 9 MCM (ref 7.4–10.4)
POTASSIUM SERPL-SCNC: 3.6 MEQ/L (ref 3.5–5.2)
RBC # BLD: 5.06 MILL/MM3 (ref 4.7–6.1)
SODIUM BLD-SCNC: 137 MEQ/L (ref 135–145)
WBC # BLD: 9.2 THOU/MM3 (ref 4.8–10.8)

## 2017-10-26 PROCEDURE — 6370000000 HC RX 637 (ALT 250 FOR IP): Performed by: NURSE PRACTITIONER

## 2017-10-26 PROCEDURE — 94640 AIRWAY INHALATION TREATMENT: CPT

## 2017-10-26 PROCEDURE — 36415 COLL VENOUS BLD VENIPUNCTURE: CPT

## 2017-10-26 PROCEDURE — 6370000000 HC RX 637 (ALT 250 FOR IP): Performed by: INTERNAL MEDICINE

## 2017-10-26 PROCEDURE — 80048 BASIC METABOLIC PNL TOTAL CA: CPT

## 2017-10-26 PROCEDURE — 82948 REAGENT STRIP/BLOOD GLUCOSE: CPT

## 2017-10-26 PROCEDURE — 99239 HOSP IP/OBS DSCHRG MGMT >30: CPT | Performed by: INTERNAL MEDICINE

## 2017-10-26 PROCEDURE — 85027 COMPLETE CBC AUTOMATED: CPT

## 2017-10-26 PROCEDURE — 2580000003 HC RX 258: Performed by: NURSE PRACTITIONER

## 2017-10-26 RX ORDER — HYDROCHLOROTHIAZIDE 25 MG/1
25 TABLET ORAL DAILY
Qty: 30 TABLET | Refills: 3 | Status: SHIPPED | OUTPATIENT
Start: 2017-10-26 | End: 2017-12-28

## 2017-10-26 RX ORDER — NITROGLYCERIN 0.4 MG/1
TABLET SUBLINGUAL
Qty: 25 TABLET | Refills: 3 | Status: SHIPPED | OUTPATIENT
Start: 2017-10-26 | End: 2017-12-28

## 2017-10-26 RX ORDER — ALBUTEROL SULFATE 90 UG/1
2 AEROSOL, METERED RESPIRATORY (INHALATION) EVERY 6 HOURS PRN
Qty: 1 INHALER | Refills: 3 | Status: SHIPPED | OUTPATIENT
Start: 2017-10-26 | End: 2022-04-11 | Stop reason: SDUPTHER

## 2017-10-26 RX ORDER — HYDRALAZINE HYDROCHLORIDE 25 MG/1
25 TABLET, FILM COATED ORAL 3 TIMES DAILY
Qty: 90 TABLET | Refills: 3 | Status: SHIPPED | OUTPATIENT
Start: 2017-10-26 | End: 2017-12-28

## 2017-10-26 RX ADMIN — HYDRALAZINE HYDROCHLORIDE 25 MG: 25 TABLET, FILM COATED ORAL at 08:05

## 2017-10-26 RX ADMIN — Medication 10 ML: at 08:05

## 2017-10-26 RX ADMIN — LOSARTAN POTASSIUM 50 MG: 50 TABLET, FILM COATED ORAL at 08:05

## 2017-10-26 RX ADMIN — DULOXETINE HYDROCHLORIDE 60 MG: 60 CAPSULE, DELAYED RELEASE ORAL at 08:05

## 2017-10-26 RX ADMIN — ASPIRIN 325 MG: 325 TABLET, DELAYED RELEASE ORAL at 08:05

## 2017-10-26 RX ADMIN — CETIRIZINE HYDROCHLORIDE 10 MG: 10 TABLET ORAL at 08:05

## 2017-10-26 RX ADMIN — HYDROCHLOROTHIAZIDE 25 MG: 25 TABLET ORAL at 08:05

## 2017-10-26 RX ADMIN — GABAPENTIN 300 MG: 300 CAPSULE ORAL at 08:05

## 2017-10-26 RX ADMIN — GABAPENTIN 300 MG: 300 CAPSULE ORAL at 13:19

## 2017-10-26 RX ADMIN — GEMFIBROZIL 600 MG: 600 TABLET ORAL at 08:05

## 2017-10-26 RX ADMIN — PANTOPRAZOLE SODIUM 40 MG: 40 TABLET, DELAYED RELEASE ORAL at 05:14

## 2017-10-26 RX ADMIN — AMLODIPINE BESYLATE 10 MG: 10 TABLET ORAL at 08:05

## 2017-10-26 RX ADMIN — FAMOTIDINE 20 MG: 20 TABLET, FILM COATED ORAL at 08:05

## 2017-10-26 RX ADMIN — METOPROLOL TARTRATE 50 MG: 50 TABLET, FILM COATED ORAL at 08:05

## 2017-10-26 RX ADMIN — Medication 2 PUFF: at 10:11

## 2017-10-26 NOTE — PROGRESS NOTES
CLINICAL PHARMACY: DISCHARGE MED RECONCILIATION/REVIEW    [facility] ([facility] Select Patient?: No  Total # of Interventions Recommended: 1 -   - Decreased Dose #: 1   -   Total # Interventions Accepted: 0  Intervention Severity:   - Level 1 Intervention Present?: No   - Level 2 #: 0   - Level 3 #: 1   Time Spent (min): 30    Additional Documentation:  Concerned with patient humalog dose of 40units TID. Patient has only been using approximately 3 units/meal here. Spoke with patient - he said this is his usual dose and that he has not eaten very much while he was here in the hospital.  Patient did give verbal feedback about what he does when has hypoglycemia.

## 2017-10-26 NOTE — DISCHARGE SUMMARY
Hospital Medicine Discharge Summary      Patient Identification:   Anastasiia Srivastava   : 1965  MRN: [id number]   Account: [de-identified]      Patient's PCP: Emma Lo Date: 10/24/2017     Discharge Date: 10/26    Admitting Physician: Reena Gerardo MD     Discharge Physician: Renetta Lawson MD     Discharge Diagnoses and hospital course: Active Hospital Problems    Diagnosis Date Noted    Hypertensive urgency [I16.0] 10/24/2017    Left upper quadrant pain [R10.12] 10/24/2017    Leukocytosis [D72.829] 10/24/2017    Essential hypertension [I10] 10/24/2017    Uncontrolled type 2 diabetes mellitus with hyperglycemia, with long-term current use of insulin (HCC) [E11.65, Z79.4] 07/10/2017    Tobacco abuse [Z72.0] 07/10/2017    History of gastroesophageal reflux (GERD) [Z87.19] 2011    High triglycerides [E78.1] 2011     Hypertensive urgency vs emergency. Clinically improving. Possibly 2/2 med non-complaince. Started on IV hydralazine and labetalol with slow steady decrease in BP. Home anti-hypertensives titrated upon discharge. Uncontrolled hyperglycemia. Started on ISS with improvement. Pt non-compliant at times at home. Will c/w home dosing. L periumbilical pain. Transient. Now resolved. PMH of DM2, hld, obesity, tobacco abuse, GERD    The patient was seen and examined on day of discharge and this discharge summary is in conjunction with any daily progress note from day of discharge.     Exam:     Vitals:  Vitals:    10/25/17 2115 10/25/17 2336 10/26/17 0416 10/26/17 0759   BP: (!) 155/100 (!) 179/98 (!) 167/97 (!) 154/95   Pulse: 95 97 82 85   Resp: 18 18 18 14   Temp: 98.1 °F (36.7 °C) 98.1 °F (36.7 °C) 98.2 °F (36.8 °C) 97.4 °F (36.3 °C)   TempSrc: Oral Oral Oral Oral   SpO2: 95% 94% 92% 96%   Weight:   207 lb 8 oz (94.1 kg)    Height:         Weight: Weight: 207 lb 8 oz (94.1 kg)     24 hour intake/output:  Intake/Output Summary (Last 24 hours) at UNIT/ML injection vial  Inject 50 Units into the skin nightly             insulin lispro (HUMALOG) 100 UNIT/ML injection vial  Inject 40 Units into the skin 3 times daily (before meals) As needed             loratadine (CLARITIN) 10 MG tablet  Take 1 tablet by mouth daily             losartan (COZAAR) 50 MG tablet  Take 1 tablet by mouth 2 times daily             metFORMIN (GLUCOPHAGE) 1000 MG tablet  TAKE 1 TABLET BY MOUTH TWICE DAILY WITH MEALS             metoprolol tartrate (LOPRESSOR) 50 MG tablet  Take 1 tablet by mouth 2 times daily             nicotine (NICODERM CQ) 21 MG/24HR  Place 1 patch onto the skin daily             nitroGLYCERIN (NITROSTAT) 0.4 MG SL tablet  up to max of 3 total doses. If no relief after 1 dose, call 911. omeprazole (PRILOSEC) 20 MG delayed release capsule  TAKE ONE CAPSULE BY MOUTH EVERY DAY             pravastatin (PRAVACHOL) 40 MG tablet  Take 1 tablet by mouth daily                   Time Spent on discharge is more than 45 minutes in the examination, evaluation, counseling and review of medications and discharge plan. Signed: Thank you Lydia Portillo for the opportunity to be involved in this patient's care.     Electronically signed by Maurice Solares MD on 10/26/2017 at 2:27 PM

## 2017-10-26 NOTE — PLAN OF CARE
Problem: Impaired respiratory status  Goal: Clear lung sounds  Outcome: Met This Shift  Dulera to maintain clear breath sounds.

## 2017-10-26 NOTE — PROGRESS NOTES
1420: Discharge teaching and instructions for diagnosis/procedure of hypertension and chest pain completed with patient using teachback method. AVS reviewed. Patient voiced understanding regarding prescriptions, follow up appointments, and care of self at home. 1430: Patient discharged home in stable condition. All belongings sent with patient.

## 2017-10-27 LAB — MRSA SCREEN: NORMAL

## 2018-02-09 ENCOUNTER — HOSPITAL ENCOUNTER (EMERGENCY)
Age: 53
Discharge: HOME | End: 2018-02-09
Payer: COMMERCIAL

## 2018-02-09 VITALS
DIASTOLIC BLOOD PRESSURE: 91 MMHG | TEMPERATURE: 97.5 F | SYSTOLIC BLOOD PRESSURE: 138 MMHG | HEIGHT: 70 IN | OXYGEN SATURATION: 96 % | RESPIRATION RATE: 20 BRPM | BODY MASS INDEX: 28.98 KG/M2 | HEART RATE: 99 BPM | WEIGHT: 202.4 LBS

## 2018-02-09 DIAGNOSIS — J40 BRONCHITIS: Primary | ICD-10-CM

## 2018-02-09 LAB
BASOPHILS # BLD: 0.3 %
BASOPHILS ABSOLUTE: 0 THOU/MM3 (ref 0–0.1)
BUN WHOLE BLOOD, UC: 12 MG/DL (ref 8–26)
CHLORIDE, WHOLE BLOOD: 94 MEQ/L (ref 98–109)
CO2, WHOLE BLOOD: 25 MEQ/L (ref 23–33)
CREATININE WHOLE BLOOD, UC: 0.9 MG/DL (ref 0.5–1.2)
EOSINOPHIL # BLD: 0.5 %
EOSINOPHILS ABSOLUTE: 0.1 THOU/MM3 (ref 0–0.4)
GFR, ESTIMATED: > 90 ML/MIN/1.73M2
GLUCOSE, WHOLE BLOOD: 304 MG/DL (ref 70–108)
HCT VFR BLD CALC: 46.7 % (ref 42–52)
HEMOGLOBIN: 16.2 GM/DL (ref 14–18)
LYMPHOCYTES # BLD: 11.7 %
LYMPHOCYTES ABSOLUTE: 1.6 THOU/MM3 (ref 1–4.8)
MCH RBC QN AUTO: 30.1 PG (ref 27–31)
MCHC RBC AUTO-ENTMCNC: 34.7 GM/DL (ref 33–37)
MCV RBC AUTO: 87 FL (ref 80–94)
MONOCYTES # BLD: 4.9 %
MONOCYTES ABSOLUTE: 0.7 THOU/MM3 (ref 0.4–1.3)
NUCLEATED RED BLOOD CELLS: 0 /100 WBC
PDW BLD-RTO: 12.2 % (ref 11.5–14.5)
PLATELET # BLD: 167 THOU/MM3 (ref 130–400)
PMV BLD AUTO: 7.9 FL (ref 7.4–10.4)
POTASSIUM, WHOLE BLOOD: 4.2 MEQ/L (ref 3.5–4.9)
RBC # BLD: 5.39 MILL/MM3 (ref 4.7–6.1)
SEG NEUTROPHILS: 82.6 %
SEGMENTED NEUTROPHILS ABSOLUTE COUNT: 11.2 THOU/MM3 (ref 1.8–7.7)
SODIUM, WHOLE BLOOD: 133 MEQ/L (ref 138–146)
WBC # BLD: 13.6 THOU/MM3 (ref 4.8–10.8)

## 2018-02-09 PROCEDURE — 82565 ASSAY OF CREATININE: CPT

## 2018-02-09 PROCEDURE — 82947 ASSAY GLUCOSE BLOOD QUANT: CPT

## 2018-02-09 PROCEDURE — 36415 COLL VENOUS BLD VENIPUNCTURE: CPT

## 2018-02-09 PROCEDURE — 99213 OFFICE O/P EST LOW 20 MIN: CPT | Performed by: NURSE PRACTITIONER

## 2018-02-09 PROCEDURE — 6360000002 HC RX W HCPCS: Performed by: NURSE PRACTITIONER

## 2018-02-09 PROCEDURE — 99214 OFFICE O/P EST MOD 30 MIN: CPT

## 2018-02-09 PROCEDURE — 84520 ASSAY OF UREA NITROGEN: CPT

## 2018-02-09 PROCEDURE — 85025 COMPLETE CBC W/AUTO DIFF WBC: CPT

## 2018-02-09 PROCEDURE — 80051 ELECTROLYTE PANEL: CPT

## 2018-02-09 RX ORDER — DOXYCYCLINE HYCLATE 100 MG
100 TABLET ORAL 2 TIMES DAILY
Qty: 20 TABLET | Refills: 0 | Status: SHIPPED | OUTPATIENT
Start: 2018-02-09 | End: 2018-02-19

## 2018-02-09 RX ORDER — ALBUTEROL SULFATE 2.5 MG/3ML
2.5 SOLUTION RESPIRATORY (INHALATION) ONCE
Status: COMPLETED | OUTPATIENT
Start: 2018-02-09 | End: 2018-02-09

## 2018-02-09 RX ADMIN — ALBUTEROL SULFATE 2.5 MG: 2.5 SOLUTION RESPIRATORY (INHALATION) at 16:21

## 2018-02-09 ASSESSMENT — PAIN DESCRIPTION - ORIENTATION: ORIENTATION: LEFT

## 2018-02-09 ASSESSMENT — ENCOUNTER SYMPTOMS
SHORTNESS OF BREATH: 1
SORE THROAT: 1
COUGH: 1
SINUS CONGESTION: 1
WHEEZING: 1
RHINORRHEA: 1

## 2018-02-09 ASSESSMENT — PAIN SCALES - GENERAL: PAINLEVEL_OUTOF10: 1

## 2018-02-09 ASSESSMENT — PAIN DESCRIPTION - LOCATION: LOCATION: EAR

## 2018-02-26 ENCOUNTER — ANESTHESIA (OUTPATIENT)
Dept: ENDOSCOPY | Age: 53
End: 2018-02-26
Payer: COMMERCIAL

## 2018-02-26 ENCOUNTER — HOSPITAL ENCOUNTER (OUTPATIENT)
Age: 53
Setting detail: OUTPATIENT SURGERY
Discharge: HOME | End: 2018-02-26
Attending: INTERNAL MEDICINE | Admitting: INTERNAL MEDICINE
Payer: COMMERCIAL

## 2018-02-26 ENCOUNTER — ANESTHESIA EVENT (OUTPATIENT)
Dept: ENDOSCOPY | Age: 53
End: 2018-02-26
Payer: COMMERCIAL

## 2018-02-26 VITALS
OXYGEN SATURATION: 93 % | SYSTOLIC BLOOD PRESSURE: 161 MMHG | HEIGHT: 70 IN | TEMPERATURE: 97.3 F | RESPIRATION RATE: 18 BRPM | WEIGHT: 204 LBS | HEART RATE: 75 BPM | DIASTOLIC BLOOD PRESSURE: 108 MMHG | BODY MASS INDEX: 29.2 KG/M2

## 2018-02-26 VITALS — SYSTOLIC BLOOD PRESSURE: 135 MMHG | DIASTOLIC BLOOD PRESSURE: 66 MMHG | OXYGEN SATURATION: 99 %

## 2018-02-26 PROCEDURE — 7100000001 HC PACU RECOVERY - ADDTL 15 MIN: Performed by: INTERNAL MEDICINE

## 2018-02-26 PROCEDURE — 2500000003 HC RX 250 WO HCPCS: Performed by: REGISTERED NURSE

## 2018-02-26 PROCEDURE — 88305 TISSUE EXAM BY PATHOLOGIST: CPT

## 2018-02-26 PROCEDURE — 7100000000 HC PACU RECOVERY - FIRST 15 MIN: Performed by: INTERNAL MEDICINE

## 2018-02-26 PROCEDURE — 3700000001 HC ADD 15 MINUTES (ANESTHESIA): Performed by: INTERNAL MEDICINE

## 2018-02-26 PROCEDURE — 2580000003 HC RX 258: Performed by: INTERNAL MEDICINE

## 2018-02-26 PROCEDURE — 3700000000 HC ANESTHESIA ATTENDED CARE: Performed by: INTERNAL MEDICINE

## 2018-02-26 PROCEDURE — 3609010400 HC COLONOSCOPY POLYPECTOMY HOT BIOPSY: Performed by: INTERNAL MEDICINE

## 2018-02-26 PROCEDURE — 6360000002 HC RX W HCPCS: Performed by: REGISTERED NURSE

## 2018-02-26 RX ORDER — SODIUM CHLORIDE 450 MG/100ML
INJECTION, SOLUTION INTRAVENOUS CONTINUOUS
Status: DISCONTINUED | OUTPATIENT
Start: 2018-02-26 | End: 2018-02-26 | Stop reason: HOSPADM

## 2018-02-26 RX ORDER — PANTOPRAZOLE SODIUM 20 MG/1
20 TABLET, DELAYED RELEASE ORAL DAILY
COMMUNITY
End: 2021-09-08

## 2018-02-26 RX ORDER — PROPOFOL 10 MG/ML
INJECTION, EMULSION INTRAVENOUS PRN
Status: DISCONTINUED | OUTPATIENT
Start: 2018-02-26 | End: 2018-02-26 | Stop reason: SDUPTHER

## 2018-02-26 RX ORDER — KETAMINE HYDROCHLORIDE 50 MG/ML
INJECTION, SOLUTION, CONCENTRATE INTRAMUSCULAR; INTRAVENOUS PRN
Status: DISCONTINUED | OUTPATIENT
Start: 2018-02-26 | End: 2018-02-26 | Stop reason: SDUPTHER

## 2018-02-26 RX ORDER — LIDOCAINE HYDROCHLORIDE 10 MG/ML
INJECTION, SOLUTION INFILTRATION; PERINEURAL PRN
Status: DISCONTINUED | OUTPATIENT
Start: 2018-02-26 | End: 2018-02-26 | Stop reason: SDUPTHER

## 2018-02-26 RX ORDER — FENTANYL CITRATE 50 UG/ML
INJECTION, SOLUTION INTRAMUSCULAR; INTRAVENOUS PRN
Status: DISCONTINUED | OUTPATIENT
Start: 2018-02-26 | End: 2018-02-26 | Stop reason: SDUPTHER

## 2018-02-26 RX ADMIN — PROPOFOL 50 MG: 10 INJECTION, EMULSION INTRAVENOUS at 09:53

## 2018-02-26 RX ADMIN — KETAMINE HYDROCHLORIDE 25 MG: 50 INJECTION, SOLUTION INTRAMUSCULAR; INTRAVENOUS at 09:53

## 2018-02-26 RX ADMIN — LIDOCAINE HYDROCHLORIDE 30 MG: 10 INJECTION, SOLUTION INFILTRATION; PERINEURAL at 09:53

## 2018-02-26 RX ADMIN — FENTANYL CITRATE 100 MCG: 50 INJECTION INTRAMUSCULAR; INTRAVENOUS at 09:53

## 2018-02-26 RX ADMIN — PROPOFOL 50 MG: 10 INJECTION, EMULSION INTRAVENOUS at 09:59

## 2018-02-26 RX ADMIN — PROPOFOL 50 MG: 10 INJECTION, EMULSION INTRAVENOUS at 10:02

## 2018-02-26 RX ADMIN — SODIUM CHLORIDE: 4.5 INJECTION, SOLUTION INTRAVENOUS at 08:39

## 2018-02-26 RX ADMIN — PROPOFOL 20 MG: 10 INJECTION, EMULSION INTRAVENOUS at 09:56

## 2018-02-26 RX ADMIN — PROPOFOL 30 MG: 10 INJECTION, EMULSION INTRAVENOUS at 10:04

## 2018-02-26 ASSESSMENT — LIFESTYLE VARIABLES: SMOKING_STATUS: 1

## 2018-02-26 ASSESSMENT — PAIN SCALES - GENERAL: PAINLEVEL_OUTOF10: 0

## 2018-02-26 ASSESSMENT — PAIN - FUNCTIONAL ASSESSMENT: PAIN_FUNCTIONAL_ASSESSMENT: 0-10

## 2018-03-20 ENCOUNTER — TELEPHONE (OUTPATIENT)
Dept: CARDIOLOGY CLINIC | Age: 53
End: 2018-03-20

## 2018-03-27 ENCOUNTER — TELEPHONE (OUTPATIENT)
Dept: CARDIOLOGY CLINIC | Age: 53
End: 2018-03-27

## 2020-01-26 ENCOUNTER — APPOINTMENT (OUTPATIENT)
Dept: CT IMAGING | Age: 55
End: 2020-01-26
Payer: COMMERCIAL

## 2020-01-26 ENCOUNTER — HOSPITAL ENCOUNTER (EMERGENCY)
Age: 55
Discharge: HOME | End: 2020-01-26
Attending: FAMILY MEDICINE
Payer: COMMERCIAL

## 2020-01-26 VITALS
HEART RATE: 77 BPM | BODY MASS INDEX: 25.9 KG/M2 | WEIGHT: 185 LBS | OXYGEN SATURATION: 93 % | TEMPERATURE: 98.6 F | SYSTOLIC BLOOD PRESSURE: 141 MMHG | RESPIRATION RATE: 18 BRPM | DIASTOLIC BLOOD PRESSURE: 89 MMHG | HEIGHT: 71 IN

## 2020-01-26 LAB
ALBUMIN SERPL-MCNC: 4.2 G/DL (ref 3.5–5.1)
ALP BLD-CCNC: 91 U/L (ref 38–126)
ALT SERPL-CCNC: 12 U/L (ref 11–66)
ANION GAP SERPL CALCULATED.3IONS-SCNC: 16 MEQ/L (ref 8–16)
AST SERPL-CCNC: 14 U/L (ref 5–40)
BACTERIA: ABNORMAL
BASOPHILS # BLD: 0.5 %
BASOPHILS ABSOLUTE: 0 THOU/MM3 (ref 0–0.1)
BILIRUB SERPL-MCNC: 0.5 MG/DL (ref 0.3–1.2)
BILIRUBIN DIRECT: < 0.2 MG/DL (ref 0–0.3)
BILIRUBIN URINE: NEGATIVE
BLOOD, URINE: NEGATIVE
BUN BLDV-MCNC: 11 MG/DL (ref 7–22)
C-REACTIVE PROTEIN: 0.7 MG/DL (ref 0–1)
CALCIUM SERPL-MCNC: 9.3 MG/DL (ref 8.5–10.5)
CASTS: ABNORMAL /LPF
CASTS: ABNORMAL /LPF
CHARACTER, URINE: CLEAR
CHLORIDE BLD-SCNC: 98 MEQ/L (ref 98–111)
CO2: 24 MEQ/L (ref 23–33)
COLOR: YELLOW
CREAT SERPL-MCNC: 0.9 MG/DL (ref 0.4–1.2)
CRYSTALS: ABNORMAL
EOSINOPHIL # BLD: 2.9 %
EOSINOPHILS ABSOLUTE: 0.3 THOU/MM3 (ref 0–0.4)
EPITHELIAL CELLS, UA: ABNORMAL /HPF
ERYTHROCYTE [DISTWIDTH] IN BLOOD BY AUTOMATED COUNT: 12.8 % (ref 11.5–14.5)
ERYTHROCYTE [DISTWIDTH] IN BLOOD BY AUTOMATED COUNT: 37.2 FL (ref 35–45)
GFR SERPL CREATININE-BSD FRML MDRD: 88 ML/MIN/1.73M2
GLUCOSE BLD-MCNC: 291 MG/DL (ref 70–108)
GLUCOSE, URINE: >= 1000 MG/DL
HCT VFR BLD CALC: 39.9 % (ref 42–52)
HEMOGLOBIN: 14.4 GM/DL (ref 14–18)
IMMATURE GRANS (ABS): 0.02 THOU/MM3 (ref 0–0.07)
IMMATURE GRANULOCYTES: 0.2 %
KETONES, URINE: NEGATIVE
LEUKOCYTE ESTERASE, URINE: NEGATIVE
LIPASE: 76.8 U/L (ref 5.6–51.3)
LYMPHOCYTES # BLD: 20.1 %
LYMPHOCYTES ABSOLUTE: 1.8 THOU/MM3 (ref 1–4.8)
MCH RBC QN AUTO: 29.3 PG (ref 26–33)
MCHC RBC AUTO-ENTMCNC: 36.1 GM/DL (ref 32.2–35.5)
MCV RBC AUTO: 81.3 FL (ref 80–94)
MISCELLANEOUS LAB TEST RESULT: ABNORMAL
MONOCYTES # BLD: 6 %
MONOCYTES ABSOLUTE: 0.5 THOU/MM3 (ref 0.4–1.3)
NITRITE, URINE: NEGATIVE
NUCLEATED RED BLOOD CELLS: 0 /100 WBC
OSMOLALITY CALCULATION: 285.8 MOSMOL/KG (ref 275–300)
PH UA: 6 (ref 5–9)
PLATELET # BLD: 176 THOU/MM3 (ref 130–400)
PMV BLD AUTO: 10.5 FL (ref 9.4–12.4)
POTASSIUM SERPL-SCNC: 4 MEQ/L (ref 3.5–5.2)
PROTEIN UA: 100 MG/DL
RBC # BLD: 4.91 MILL/MM3 (ref 4.7–6.1)
RBC URINE: ABNORMAL /HPF
RENAL EPITHELIAL, UA: ABNORMAL
SEG NEUTROPHILS: 70.3 %
SEGMENTED NEUTROPHILS ABSOLUTE COUNT: 6.4 THOU/MM3 (ref 1.8–7.7)
SODIUM BLD-SCNC: 138 MEQ/L (ref 135–145)
SPECIFIC GRAVITY UA: > 1.03 (ref 1–1.03)
TOTAL PROTEIN: 7 G/DL (ref 6.1–8)
UROBILINOGEN, URINE: 1 EU/DL (ref 0–1)
WBC # BLD: 9.1 THOU/MM3 (ref 4.8–10.8)
WBC UA: ABNORMAL /HPF
YEAST: ABNORMAL

## 2020-01-26 PROCEDURE — 85025 COMPLETE CBC W/AUTO DIFF WBC: CPT

## 2020-01-26 PROCEDURE — 80053 COMPREHEN METABOLIC PANEL: CPT

## 2020-01-26 PROCEDURE — 2580000003 HC RX 258: Performed by: FAMILY MEDICINE

## 2020-01-26 PROCEDURE — 87086 URINE CULTURE/COLONY COUNT: CPT

## 2020-01-26 PROCEDURE — 99284 EMERGENCY DEPT VISIT MOD MDM: CPT

## 2020-01-26 PROCEDURE — 81001 URINALYSIS AUTO W/SCOPE: CPT

## 2020-01-26 PROCEDURE — 86140 C-REACTIVE PROTEIN: CPT

## 2020-01-26 PROCEDURE — 6360000002 HC RX W HCPCS: Performed by: FAMILY MEDICINE

## 2020-01-26 PROCEDURE — 6370000000 HC RX 637 (ALT 250 FOR IP): Performed by: FAMILY MEDICINE

## 2020-01-26 PROCEDURE — 36415 COLL VENOUS BLD VENIPUNCTURE: CPT

## 2020-01-26 PROCEDURE — 74177 CT ABD & PELVIS W/CONTRAST: CPT

## 2020-01-26 PROCEDURE — 82248 BILIRUBIN DIRECT: CPT

## 2020-01-26 PROCEDURE — 83690 ASSAY OF LIPASE: CPT

## 2020-01-26 PROCEDURE — 96375 TX/PRO/DX INJ NEW DRUG ADDON: CPT

## 2020-01-26 PROCEDURE — 96374 THER/PROPH/DIAG INJ IV PUSH: CPT

## 2020-01-26 PROCEDURE — 6360000004 HC RX CONTRAST MEDICATION: Performed by: FAMILY MEDICINE

## 2020-01-26 RX ORDER — MORPHINE SULFATE 4 MG/ML
4 INJECTION, SOLUTION INTRAMUSCULAR; INTRAVENOUS ONCE
Status: COMPLETED | OUTPATIENT
Start: 2020-01-26 | End: 2020-01-26

## 2020-01-26 RX ORDER — SODIUM CHLORIDE 9 MG/ML
INJECTION, SOLUTION INTRAVENOUS CONTINUOUS
Status: DISCONTINUED | OUTPATIENT
Start: 2020-01-26 | End: 2020-01-26 | Stop reason: HOSPADM

## 2020-01-26 RX ORDER — DICYCLOMINE HYDROCHLORIDE 10 MG/1
20 CAPSULE ORAL ONCE
Status: COMPLETED | OUTPATIENT
Start: 2020-01-26 | End: 2020-01-26

## 2020-01-26 RX ORDER — ONDANSETRON 2 MG/ML
4 INJECTION INTRAMUSCULAR; INTRAVENOUS ONCE
Status: COMPLETED | OUTPATIENT
Start: 2020-01-26 | End: 2020-01-26

## 2020-01-26 RX ORDER — DICYCLOMINE HCL 20 MG
20 TABLET ORAL
Qty: 20 TABLET | Refills: 0 | Status: SHIPPED | OUTPATIENT
Start: 2020-01-26

## 2020-01-26 RX ADMIN — IOPAMIDOL 80 ML: 755 INJECTION, SOLUTION INTRAVENOUS at 15:28

## 2020-01-26 RX ADMIN — ONDANSETRON 4 MG: 2 INJECTION INTRAMUSCULAR; INTRAVENOUS at 14:43

## 2020-01-26 RX ADMIN — DICYCLOMINE HYDROCHLORIDE 20 MG: 10 CAPSULE ORAL at 14:42

## 2020-01-26 RX ADMIN — MORPHINE SULFATE 4 MG: 4 INJECTION, SOLUTION INTRAMUSCULAR; INTRAVENOUS at 14:43

## 2020-01-26 RX ADMIN — SODIUM CHLORIDE: 9 INJECTION, SOLUTION INTRAVENOUS at 14:43

## 2020-01-26 ASSESSMENT — ENCOUNTER SYMPTOMS
NAUSEA: 0
ABDOMINAL PAIN: 1
SHORTNESS OF BREATH: 0
BLOOD IN STOOL: 0
VOMITING: 0

## 2020-01-26 ASSESSMENT — PAIN DESCRIPTION - LOCATION: LOCATION: FLANK

## 2020-01-26 ASSESSMENT — PAIN SCALES - GENERAL
PAINLEVEL_OUTOF10: 10
PAINLEVEL_OUTOF10: 7

## 2020-01-26 NOTE — ED NOTES
yariel GILMAN at bedside. No other questions or concerns at this time.      Gemini Ash RN  01/26/20 1000

## 2020-01-26 NOTE — ED TRIAGE NOTES
Patient to ER due to having left sided lower abd/flank pain. Patient states it has been going on for weeks but has been worse the last couple days. Patient states it is worse if he pushes on it. Laying on his right side helps some he stated. Patient states he is on blood pressure medication.

## 2020-01-26 NOTE — ED PROVIDER NOTES
Left-sided abdominal pain   Timing/Onset: At least a month ago, occurs every day  Duration: Waxing and waning throughout the day, worse at night  Modifying Factors: No change in pain with food intake, slight relief with bowel movements   Severity: 10/10    The HPI was provided by the patient. REVIEW OF SYSTEMS     Review of Systems   Constitutional: Negative for chills and fever. HENT: Negative for congestion. Respiratory: Negative for shortness of breath. Cardiovascular: Negative for chest pain. Gastrointestinal: Positive for abdominal pain. Negative for blood in stool, nausea and vomiting. Upper quadrant abdominal pain    Sometimes the pain diminishes after a bowel movement    He has been a little constipated recently    No actual change in his pain with eating typically   Endocrine: Negative for polydipsia and polyuria. Blood sugars have been somewhat high   Genitourinary: Negative for dysuria and hematuria. Musculoskeletal: Negative for joint swelling. Skin: Negative for rash. Neurological: Negative for weakness and numbness. Hematological: Does not bruise/bleed easily. Psychiatric/Behavioral: Negative for confusion. PAST MEDICAL HISTORY    has a past medical history of Abscess of leg, Allergic rhinitis, Asthma, Blood circulation, collateral, GERD (gastroesophageal reflux disease), Headache(784.0), Hyperlipidemia, Hypertension, Neuropathy, Osteoarthritis, Other disorders of kidney and ureter, Pneumonia, and Type II or unspecified type diabetes mellitus without mention of complication, not stated as uncontrolled. SURGICAL HISTORY    has a past surgical history that includes Foot surgery (Right, 2008); Hand surgery (Left, 2010); Appendectomy (5/31/12); vascular surgery; Cardiac catheterization (2007); Arm Surgery; and Colonoscopy (Left, 2/26/2018).     CURRENT MEDICATIONS       Discharge Medication List as of 1/26/2020  5:02 PM      CONTINUE these medications which have NOT CHANGED    Details   metoprolol tartrate (LOPRESSOR) 50 MG tablet Take 1 tablet by mouth 2 times daily, Disp-60 tablet, R-0Normal      losartan (COZAAR) 50 MG tablet Take 1 tablet by mouth 2 times daily, Disp-30 tablet, R-3Normal      pantoprazole (PROTONIX) 20 MG tablet Take 20 mg by mouth dailyHistorical Med      polyethylene glycol (GLYCOLAX) powder Colonoscopy Prep Dispense 255 Gram Bottle. Use as Directed, Disp-255 g, R-0Normal      albuterol sulfate HFA (PROVENTIL HFA) 108 (90 Base) MCG/ACT inhaler Inhale 2 puffs into the lungs every 6 hours as needed for Wheezing, Disp-1 Inhaler, R-3Normal      metFORMIN (GLUCOPHAGE) 1000 MG tablet TAKE 1 TABLET BY MOUTH TWICE DAILY WITH MEALS, Disp-180 tablet, R-0      gemfibrozil (LOPID) 600 MG tablet TAKE 1 TABLET BY MOUTH TWICE DAILY, Disp-180 tablet, R-1      DULoxetine (CYMBALTA) 60 MG capsule TAKE ONE CAPSULE BY MOUTH DAILY, Disp-90 capsule, R-5      insulin lispro (HUMALOG) 100 UNIT/ML injection vial Inject 40 Units into the skin 3 times daily (before meals) As needed, Disp-4 vial, R-5      insulin glargine (LANTUS) 100 UNIT/ML injection vial Inject 50 Units into the skin nightly, Disp-2 vial, R-5      glucose monitoring kit (FREESTYLE) monitoring kit DAILY PRN Starting 3/30/2016, Until Discontinued, Disp-1 kit, R-0, Normal      gabapentin (NEURONTIN) 300 MG capsule Take 1 capsule by mouth daily, Disp-90 capsule, R-6      pravastatin (PRAVACHOL) 40 MG tablet Take 1 tablet by mouth daily, Disp-90 tablet, R-1      fluticasone-salmeterol (ADVAIR DISKUS) 250-50 MCG/DOSE AEPB Inhale 1 puff into the lungs every 12 hours, Disp-3 each, R-1      aspirin 325 MG EC tablet Take 1 tablet by mouth daily. , Disp-90 tablet, R-3             ALLERGIES     is allergic to cephalexin; januvia [sitagliptin]; and quinapril hcl. FAMILY HISTORY     He indicated that his mother is alive. He indicated that his father is . He indicated that his sister is alive.  He indicated that the status of his maternal aunt is unknown. He indicated that the status of his neg hx is unknown.   family history includes Cancer in his maternal aunt; Heart Disease in his father; High Blood Pressure in his mother. SOCIAL HISTORY      reports that he has been smoking cigars. He has never used smokeless tobacco. He reports that he does not drink alcohol or use drugs. PHYSICAL EXAM     INITIAL VITALS:  height is 5' 11\" (1.803 m) and weight is 185 lb (83.9 kg). His oral temperature is 98.6 °F (37 °C). His blood pressure is 141/89 (abnormal) and his pulse is 77. His respiration is 18 and oxygen saturation is 93%. Physical Exam  Vitals signs and nursing note reviewed. Constitutional:       Comments: GCS 15   HENT:      Head: Normocephalic and atraumatic. Eyes:      General: No scleral icterus. Extraocular Movements: Extraocular movements intact. Pupils: Pupils are equal, round, and reactive to light. Neck:      Musculoskeletal: Normal range of motion and neck supple. Cardiovascular:      Rate and Rhythm: Normal rate and regular rhythm. Pulses: Normal pulses. Heart sounds: Normal heart sounds. Pulmonary:      Effort: Pulmonary effort is normal.      Breath sounds: Normal breath sounds. No wheezing. Abdominal:      Tenderness: There is abdominal tenderness. There is no guarding or rebound. Comments: Tender left upper quadrant subcostal   Musculoskeletal: Normal range of motion. General: No deformity. Lymphadenopathy:      Cervical: No cervical adenopathy. Skin:     General: Skin is warm and dry. Findings: No rash. Neurological:      Mental Status: He is alert. Motor: No weakness.    Psychiatric:         Mood and Affect: Mood normal.         Behavior: Behavior normal.         DIFFERENTIAL DIAGNOSIS:     Left upper quadrant subcostal pain, daily, for a month    Consider splenic lesions, kidney related event, diverticulitis, constipation,        DIAGNOSTIC RESULTS       RADIOLOGY: non-plain film images(s) such as CT, Ultrasound and MRI are read by the radiologist.    CT ABDOMEN PELVIS W IV CONTRAST Additional Contrast? None   Final Result   Minimal hazy edema near the left colon and inferior border of the left kidney. Definite diverticular lesions are not identified. Mild colitis and/or diverticulitis are still considerations. **This report has been created using voice recognition software. It may contain minor errors which are inherent in voice recognition technology. **      Final report electronically signed by Dr. Bernadette Mckee on 1/26/2020 3:48 PM           LABS:   Labs Reviewed   CBC WITH AUTO DIFFERENTIAL - Abnormal; Notable for the following components:       Result Value    Hematocrit 39.9 (*)     MCHC 36.1 (*)     All other components within normal limits   BASIC METABOLIC PANEL - Abnormal; Notable for the following components:    Glucose 291 (*)     All other components within normal limits   LIPASE - Abnormal; Notable for the following components:    Lipase 76.8 (*)     All other components within normal limits   URINALYSIS WITH MICROSCOPIC - Abnormal; Notable for the following components:    Glucose, Urine >= 1000 (*)     Specific Gravity, UA >1.030 (*)     Protein,  (*)     All other components within normal limits   GLOMERULAR FILTRATION RATE, ESTIMATED - Abnormal; Notable for the following components:    Est, Glom Filt Rate 88 (*)     All other components within normal limits   URINE CULTURE   HEPATIC FUNCTION PANEL   C-REACTIVE PROTEIN   ANION GAP   OSMOLALITY       EMERGENCY DEPARTMENT COURSE:   Vitals:    Vitals:    01/26/20 1424 01/26/20 1540 01/26/20 1643   BP: (!) 187/104 (!) 152/100 (!) 141/89   Pulse: 83 73 77   Resp: 20 20 18   Temp: 98.6 °F (37 °C)     TempSrc: Oral     SpO2: 98% 98% 93%   Weight: 185 lb (83.9 kg)     Height: 5' 11\" (1.803 m)         2:37 PM: The patient was seen and evaluated. Nursing notes reviewed    Normal CBC    Blood sugar 291    Urine showed glucose urea but no significant cellular component    Liver panel normal    Lipase 76 slightly elevated    Renal function normal CRP normal    CT scan of the abdomen and pelvis showed some subtle mesenteric edema near the left colon/pararenal abdominal mesenteric fat of questionable etiology    Clinically he has had some improvement of pain with stooling suggestive of colonic cause of pain    With no elevation of the white count or CRP doubt he has diverticulitis    Given Bentyl p.o., Zofran plus morphine IV    Work-up is completed and he is doing better    I would hold on antibiotics at this time as there is no definite infectious cause    He has had remote colonoscopy by Brandy Schultz, and I recommend he follow with Brandy Schultz for recheck    He has had some tendency to be constipated recently can try MiraLAX    Use Bentyl for pain    Discharge home        CRITICAL CARE:   none     CONSULTS:  none    PROCEDURES:  none     FINAL IMPRESSION      1.  Abdominal pain, left upper quadrant          DISPOSITION/PLAN     Discharge home    PATIENT REFERRED TO:  Emily Coppola, APRN - [facility]  [address] [place] [address]    In 1 week      Alisson Gonzalez MD  [address] [place] [address]  [phone]    Schedule an appointment as soon as possible for a visit   For recheck of the abdominal pain      DISCHARGE MEDICATIONS:  Discharge Medication List as of 1/26/2020  5:02 PM      START taking these medications    Details   dicyclomine (BENTYL) 20 MG tablet Take 1 tablet by mouth 4 times daily (before meals and nightly), Disp-20 tablet, R-0Print             (Please note that portions of this note were completed with a voice recognition program.  Efforts were made to edit the dictations but occasionally words are mis-transcribed.)    Scribe:  Zenaida Wasserman 1/26/20 2:37 PM Scribing for and in the presence of Mone Gallagher

## 2020-01-28 LAB — URINE CULTURE, ROUTINE: NORMAL

## 2020-09-29 ENCOUNTER — TELEPHONE (OUTPATIENT)
Dept: CARDIOLOGY CLINIC | Age: 55
End: 2020-09-29

## 2021-05-21 ENCOUNTER — HOSPITAL ENCOUNTER (EMERGENCY)
Age: 56
Discharge: HOME | End: 2021-05-21
Attending: EMERGENCY MEDICINE
Payer: COMMERCIAL

## 2021-05-21 ENCOUNTER — APPOINTMENT (OUTPATIENT)
Dept: GENERAL RADIOLOGY | Age: 56
End: 2021-05-21
Payer: COMMERCIAL

## 2021-05-21 VITALS
TEMPERATURE: 98.7 F | OXYGEN SATURATION: 97 % | RESPIRATION RATE: 17 BRPM | HEART RATE: 72 BPM | DIASTOLIC BLOOD PRESSURE: 74 MMHG | SYSTOLIC BLOOD PRESSURE: 144 MMHG | WEIGHT: 180 LBS | BODY MASS INDEX: 25.1 KG/M2

## 2021-05-21 DIAGNOSIS — E86.0 DEHYDRATION: Primary | ICD-10-CM

## 2021-05-21 DIAGNOSIS — E83.42 HYPOMAGNESEMIA: ICD-10-CM

## 2021-05-21 LAB
ALBUMIN SERPL-MCNC: 4.1 G/DL (ref 3.5–5.1)
ALP BLD-CCNC: 65 U/L (ref 38–126)
ALT SERPL-CCNC: 8 U/L (ref 11–66)
ANION GAP SERPL CALCULATED.3IONS-SCNC: 10 MEQ/L (ref 8–16)
AST SERPL-CCNC: 9 U/L (ref 5–40)
BASOPHILS # BLD: 0.5 %
BASOPHILS ABSOLUTE: 0 THOU/MM3 (ref 0–0.1)
BILIRUB SERPL-MCNC: 0.4 MG/DL (ref 0.3–1.2)
BUN BLDV-MCNC: 18 MG/DL (ref 7–22)
CALCIUM SERPL-MCNC: 9.4 MG/DL (ref 8.5–10.5)
CHLORIDE BLD-SCNC: 96 MEQ/L (ref 98–111)
CO2: 29 MEQ/L (ref 23–33)
CREAT SERPL-MCNC: 1.5 MG/DL (ref 0.4–1.2)
EOSINOPHIL # BLD: 2.3 %
EOSINOPHILS ABSOLUTE: 0.2 THOU/MM3 (ref 0–0.4)
ERYTHROCYTE [DISTWIDTH] IN BLOOD BY AUTOMATED COUNT: 12.3 % (ref 11.5–14.5)
ERYTHROCYTE [DISTWIDTH] IN BLOOD BY AUTOMATED COUNT: 36.8 FL (ref 35–45)
GFR SERPL CREATININE-BSD FRML MDRD: 49 ML/MIN/1.73M2
GLUCOSE BLD-MCNC: 164 MG/DL (ref 70–108)
GLUCOSE BLD-MCNC: 191 MG/DL (ref 70–108)
HCT VFR BLD CALC: 35.6 % (ref 42–52)
HEMOGLOBIN: 13 GM/DL (ref 14–18)
IMMATURE GRANS (ABS): 0.04 THOU/MM3 (ref 0–0.07)
IMMATURE GRANULOCYTES: 0.4 %
INR BLD: 0.98 (ref 0.85–1.13)
LYMPHOCYTES # BLD: 24.3 %
LYMPHOCYTES ABSOLUTE: 2.3 THOU/MM3 (ref 1–4.8)
MAGNESIUM: 1.5 MG/DL (ref 1.6–2.4)
MCH RBC QN AUTO: 30.3 PG (ref 26–33)
MCHC RBC AUTO-ENTMCNC: 36.5 GM/DL (ref 32.2–35.5)
MCV RBC AUTO: 83 FL (ref 80–94)
MONOCYTES # BLD: 7.3 %
MONOCYTES ABSOLUTE: 0.7 THOU/MM3 (ref 0.4–1.3)
NUCLEATED RED BLOOD CELLS: 0 /100 WBC
OSMOLALITY CALCULATION: 275.6 MOSMOL/KG (ref 275–300)
PLATELET # BLD: 177 THOU/MM3 (ref 130–400)
PMV BLD AUTO: 10.9 FL (ref 9.4–12.4)
POTASSIUM REFLEX MAGNESIUM: 3.3 MEQ/L (ref 3.5–5.2)
PRO-BNP: 95.2 PG/ML (ref 0–900)
RBC # BLD: 4.29 MILL/MM3 (ref 4.7–6.1)
SEG NEUTROPHILS: 65.2 %
SEGMENTED NEUTROPHILS ABSOLUTE COUNT: 6.2 THOU/MM3 (ref 1.8–7.7)
SODIUM BLD-SCNC: 135 MEQ/L (ref 135–145)
TOTAL PROTEIN: 6.9 G/DL (ref 6.1–8)
TROPONIN T: < 0.01 NG/ML
WBC # BLD: 9.5 THOU/MM3 (ref 4.8–10.8)

## 2021-05-21 PROCEDURE — 6360000002 HC RX W HCPCS: Performed by: EMERGENCY MEDICINE

## 2021-05-21 PROCEDURE — 96366 THER/PROPH/DIAG IV INF ADDON: CPT

## 2021-05-21 PROCEDURE — 71045 X-RAY EXAM CHEST 1 VIEW: CPT

## 2021-05-21 PROCEDURE — 2580000003 HC RX 258: Performed by: EMERGENCY MEDICINE

## 2021-05-21 PROCEDURE — 99285 EMERGENCY DEPT VISIT HI MDM: CPT

## 2021-05-21 PROCEDURE — 84484 ASSAY OF TROPONIN QUANT: CPT

## 2021-05-21 PROCEDURE — 36415 COLL VENOUS BLD VENIPUNCTURE: CPT

## 2021-05-21 PROCEDURE — 96365 THER/PROPH/DIAG IV INF INIT: CPT

## 2021-05-21 PROCEDURE — 80053 COMPREHEN METABOLIC PANEL: CPT

## 2021-05-21 PROCEDURE — 96361 HYDRATE IV INFUSION ADD-ON: CPT

## 2021-05-21 PROCEDURE — 83735 ASSAY OF MAGNESIUM: CPT

## 2021-05-21 PROCEDURE — 85610 PROTHROMBIN TIME: CPT

## 2021-05-21 PROCEDURE — 93005 ELECTROCARDIOGRAM TRACING: CPT | Performed by: EMERGENCY MEDICINE

## 2021-05-21 PROCEDURE — 82948 REAGENT STRIP/BLOOD GLUCOSE: CPT

## 2021-05-21 PROCEDURE — 85025 COMPLETE CBC W/AUTO DIFF WBC: CPT

## 2021-05-21 PROCEDURE — 83880 ASSAY OF NATRIURETIC PEPTIDE: CPT

## 2021-05-21 RX ORDER — 0.9 % SODIUM CHLORIDE 0.9 %
1000 INTRAVENOUS SOLUTION INTRAVENOUS ONCE
Status: COMPLETED | OUTPATIENT
Start: 2021-05-21 | End: 2021-05-21

## 2021-05-21 RX ORDER — MAGNESIUM SULFATE IN WATER 40 MG/ML
2000 INJECTION, SOLUTION INTRAVENOUS ONCE
Status: COMPLETED | OUTPATIENT
Start: 2021-05-21 | End: 2021-05-21

## 2021-05-21 RX ADMIN — SODIUM CHLORIDE 1000 ML: 9 INJECTION, SOLUTION INTRAVENOUS at 20:12

## 2021-05-21 RX ADMIN — MAGNESIUM SULFATE HEPTAHYDRATE 2000 MG: 40 INJECTION, SOLUTION INTRAVENOUS at 21:22

## 2021-05-21 RX ADMIN — SODIUM CHLORIDE 1000 ML: 9 INJECTION, SOLUTION INTRAVENOUS at 19:10

## 2021-05-21 ASSESSMENT — ENCOUNTER SYMPTOMS
BACK PAIN: 0
SHORTNESS OF BREATH: 0
COUGH: 0
VOMITING: 0
EYE REDNESS: 0
DIARRHEA: 0
TROUBLE SWALLOWING: 0
CONSTIPATION: 0
ABDOMINAL PAIN: 0
NAUSEA: 1

## 2021-05-21 NOTE — ED NOTES
ED nurse-to-nurse bedside report    Chief Complaint   Patient presents with    Altered Mental Status    Hypotension      LOC: alert and orientated to name, place, date  Vital signs   Vitals:    05/21/21 1838 05/21/21 1839 05/21/21 1843   BP: 97/71     Pulse: 80     Resp: 13     Temp: 98.7 °F (37.1 °C)     TempSrc: Oral     SpO2: 93% (S) (!) 88% 96%   Weight: 180 lb (81.6 kg)        Pain:    Pain Interventions: n/a  Pain Goal: n/a  Oxygen: Yes    Current needs required [facility]   Telemetry: Yes  LDAs:   Peripheral IV 05/21/21 Right Antecubital (Active)   Site Assessment Clean;Dry; Intact 05/21/21 1839   Line Status Blood return noted; Infusing 05/21/21 1839   Dressing Status Clean;Dry; Intact 05/21/21 1839   Dressing Intervention New 05/21/21 1839     Continuous Infusions:   Mobility: Requires assistance * 1  Joy Fall Risk Score: No flowsheet data found. Fall Interventions: both side rails up with call light in reach.    Report given to: Zack Elder RN  05/21/21 1921

## 2021-05-21 NOTE — ED NOTES
Bed: 003A  Expected date: 5/21/21  Expected time: 6:28 PM  Means of arrival: [facility]  Comments:     Jennifer Mtz RN  05/21/21 4470

## 2021-05-21 NOTE — ED NOTES
Bedside report received from [facility]. This nurse assuming care. Patient resting in bed. Respirations easy and unlabored. No distress noted. Call light within reach.        Leandra Hodges RN  05/21/21 1925

## 2021-05-22 LAB
EKG ATRIAL RATE: 79 BPM
EKG P AXIS: 57 DEGREES
EKG P-R INTERVAL: 192 MS
EKG Q-T INTERVAL: 356 MS
EKG QRS DURATION: 86 MS
EKG QTC CALCULATION (BAZETT): 408 MS
EKG R AXIS: 17 DEGREES
EKG T AXIS: 81 DEGREES
EKG VENTRICULAR RATE: 79 BPM

## 2021-05-22 PROCEDURE — 93010 ELECTROCARDIOGRAM REPORT: CPT | Performed by: INTERNAL MEDICINE

## 2021-05-22 NOTE — ED NOTES
Patient resting in bed. Respirations easy and unlabored. No distress noted. Call light within reach.        Carlos Gray RN  05/21/21 6567

## 2021-05-22 NOTE — ED PROVIDER NOTES
[facility] [address] [address] EMERGENCY DEPT    EMERGENCY MEDICINE     Pt Name: Jaylin Rodriguez  MRN: [id number]  Nikh[place]gfurt 1965  Date of evaluation: 5/21/2021  Provider: Jessica Lopes MD,     [address] [place] [address]       Chief Complaint   Patient presents with    Altered Mental Status    Hypotension       HISTORY OF PRESENT ILLNESS    Jaylin Rodriguez is a pleasant 48 y.o. male who presents to the emergency department from home via EMS for dizziness and altered mental status. Patient states that he was in a workshop when he began feeling lightheaded and dizzy. He became very sweaty and felt \"off \". Patient denies any chest pain, difficulty breathing, cough, recent fever. Wife states that his blood sugars have been altered today. His blood sugar was in the 300s prior to squad getting there. When EMS evaluated medics reported the high 100s. Patient states he has not had anything to eat except for some chips today. He has not had any water or anything else to drink today. He states that he has been feeling nauseated lately and does not have an appetite. Patient states his symptoms lasted approximately 1 hour and he is currently feeling better here in the emergency department. Triage notes and Nursing notes were reviewed by myself. Any discrepancies are addressed above.     PAST MEDICAL HISTORY     Past Medical History:   Diagnosis Date    Abscess of leg     Allergic rhinitis     Asthma     Blood circulation, collateral     numbness in hands and feet    GERD (gastroesophageal reflux disease)     Headache(784.0)     Hyperlipidemia     Hypertension     Neuropathy     Osteoarthritis     Other disorders of kidney and ureter     Pneumonia     Type II or unspecified type diabetes mellitus without mention of complication, not stated as uncontrolled     type 2       SURGICAL HISTORY       Past Surgical History:   Procedure Laterality Date    APPENDECTOMY  5/31/12    bree Davies    ARM SURGERY  CARDIAC CATHETERIZATION  2007    WNL, no stents     COLONOSCOPY Left 2/26/2018    COLONOSCOPY POLYPECTOMY HOT BIOPSY performed by Wilfrid Camp MD at [facility] Right 2008    Rt leg    HAND SURGERY Left 2010    VASCULAR SURGERY      pinched nerve left arm       CURRENT MEDICATIONS       Discharge Medication List as of 5/21/2021 10:48 PM      CONTINUE these medications which have NOT CHANGED    Details   dicyclomine (BENTYL) 20 MG tablet Take 1 tablet by mouth 4 times daily (before meals and nightly), Disp-20 tablet, R-0Print      pantoprazole (PROTONIX) 20 MG tablet Take 20 mg by mouth dailyHistorical Med      polyethylene glycol (GLYCOLAX) powder Colonoscopy Prep Dispense 255 Gram Bottle.   Use as Directed, Disp-255 g, R-0Normal      albuterol sulfate HFA (PROVENTIL HFA) 108 (90 Base) MCG/ACT inhaler Inhale 2 puffs into the lungs every 6 hours as needed for Wheezing, Disp-1 Inhaler, R-3Normal      metoprolol tartrate (LOPRESSOR) 50 MG tablet Take 1 tablet by mouth 2 times daily, Disp-60 tablet, R-0Normal      losartan (COZAAR) 50 MG tablet Take 1 tablet by mouth 2 times daily, Disp-30 tablet, R-3Normal      metFORMIN (GLUCOPHAGE) 1000 MG tablet TAKE 1 TABLET BY MOUTH TWICE DAILY WITH MEALS, Disp-180 tablet, R-0      gemfibrozil (LOPID) 600 MG tablet TAKE 1 TABLET BY MOUTH TWICE DAILY, Disp-180 tablet, R-1      DULoxetine (CYMBALTA) 60 MG capsule TAKE ONE CAPSULE BY MOUTH DAILY, Disp-90 capsule, R-5      insulin lispro (HUMALOG) 100 UNIT/ML injection vial Inject 40 Units into the skin 3 times daily (before meals) As needed, Disp-4 vial, R-5      insulin glargine (LANTUS) 100 UNIT/ML injection vial Inject 50 Units into the skin nightly, Disp-2 vial, R-5      glucose monitoring kit (FREESTYLE) monitoring kit DAILY PRN Starting 3/30/2016, Until Discontinued, Disp-1 kit, R-0, Normal      gabapentin (NEURONTIN) 300 MG capsule Take 1 capsule by mouth daily, Disp-90 capsule, R-6 Organization Meetings:     Marital Status:    Intimate Partner Violence:     Fear of Current or Ex-Partner:     Emotionally Abused:     Physically Abused:     Sexually Abused:        REVIEW OF SYSTEMS     Review of Systems   Constitutional: Positive for diaphoresis. Negative for fatigue and fever. HENT: Negative for congestion and trouble swallowing. Eyes: Negative for redness. Respiratory: Negative for cough and shortness of breath. Cardiovascular: Negative for chest pain. Gastrointestinal: Positive for nausea. Negative for abdominal pain, constipation, diarrhea and vomiting. Genitourinary: Negative for difficulty urinating. Musculoskeletal: Negative for back pain. Skin: Negative for rash. Allergic/Immunologic: Negative for immunocompromised state. Neurological: Positive for dizziness and light-headedness. Negative for headaches. Hematological: Does not bruise/bleed easily. Except as noted above the remainder of the review of systems was reviewed and is. PHYSICAL EXAM    (up to 7 for level 4, 8 or more for level 5)     ED Triage Vitals [05/21/21 1838]   BP Temp Temp Source Pulse Resp SpO2 Height Weight   97/71 98.7 °F (37.1 °C) Oral 80 13 93 % -- 180 lb (81.6 kg)       Physical Exam  Vitals and nursing note reviewed. Constitutional:       General: He is not in acute distress. Appearance: He is normal weight. He is ill-appearing. HENT:      Head: Normocephalic and atraumatic. Mouth/Throat:      Mouth: Mucous membranes are dry. Pharynx: Oropharynx is clear. Eyes:      General: No scleral icterus. Extraocular Movements: Extraocular movements intact. Pupils: Pupils are equal, round, and reactive to light. Pupils are equal.   Cardiovascular:      Rate and Rhythm: Normal rate and regular rhythm. Heart sounds: Normal heart sounds. No murmur heard. Pulmonary:      Effort: Pulmonary effort is normal. No respiratory distress.       Breath sounds: Normal breath sounds. No decreased breath sounds. Abdominal:      General: Bowel sounds are normal.      Palpations: Abdomen is soft. Tenderness: There is no abdominal tenderness. There is no guarding or rebound. Musculoskeletal:         General: Normal range of motion. Cervical back: Neck supple. Right lower leg: No edema. Left lower leg: No edema. Skin:     General: Skin is warm and dry. Capillary Refill: Capillary refill takes 2 to 3 seconds. Neurological:      General: No focal deficit present. Mental Status: He is alert and oriented to person, place, and time. DIAGNOSTIC RESULTS     EKG:(none if blank)  All EKG's are interpreted by [facility] Phys[facility] who either signs or Co-signs this chart in the absence of a cardiologist.        RADIOLOGY: (none if blank)   Interpretation per the Radiologistbelow, if available at the time of this note:    XR CHEST PORTABLE   Final Result   Impression:   Streaky opacities of the left lung base, may reflect subsegmental    atelectasis or viral process, follow-up to clearing is recommended. Otherwise stable. This document has been electronically signed by:  Constantine Lemus MD on 05/21/2021    07:36 PM          LABS:  Labs Reviewed   CBC WITH AUTO DIFFERENTIAL - Abnormal; Notable for the following components:       Result Value    RBC 4.29 (*)     Hemoglobin 13.0 (*)     Hematocrit 35.6 (*)     MCHC 36.5 (*)     All other components within normal limits   COMPREHENSIVE METABOLIC PANEL W/ REFLEX TO MG FOR LOW K - Abnormal; Notable for the following components:    Glucose 164 (*)     CREATININE 1.5 (*)     Potassium reflex Magnesium 3.3 (*)     Chloride 96 (*)     ALT 8 (*)     All other components within normal limits   GLOMERULAR FILTRATION RATE, ESTIMATED - Abnormal; Notable for the following components:    Est, Glom Filt Rate 49 (*)     All other components within normal limits   MAGNESIUM - Abnormal; Notable for the following components:    Magnesium 1.5 (*)     All other components within normal limits   POCT GLUCOSE - Abnormal; Notable for the following components:    POC Glucose 191 (*)     All other components within normal limits   TROPONIN   BRAIN NATRIURETIC PEPTIDE   PROTIME-INR   ANION GAP   OSMOLALITY       All other labs were within normal range or not returned as of this dictation. Please note, any cultures that may have been sent were not resulted at the time of this patient visit. EMERGENCY DEPARTMENT COURSE andMedical Decision Making:     MDM  Number of Diagnoses or Management Options  Dehydration  Hypomagnesemia  Diagnosis management comments: 71-year-old male presents emergency room for episode of dizziness and diaphoresis. Differential includes hypoglycemia, vasovagal presyncope, dehydration, ACS, pneumonia, pneumothorax, electrolyte abnormality. Will evaluate with CBC, CMP, troponin, UA, chest x-ray. Patient appears clinically dry on my evaluation. Will give IV hydration here as this may be secondary to not eating or drinking for 24 hours. /  ED Course as of May 22 0119   Fri May 21, 2021   2112 Mild elevation in creatinine consistent with dehydration. Magnesium was also low. Patient's blood pressure has responded well to IV fluids. Will replace magnesium here. Will need to ambulate the patient to ensure he does not desat and is able to tolerate that. [DD]   2113 Chest x-ray indicates atelectasis versus viral process. [DD]   1505 Reevaluated patient. He is feeling much better and blood pressure is improved with fluids. Removed oxygen and patient maintained saturations of 98 to 99% on room air. Will have him ambulate with the nurse and then plan for discharge home.    [DD]      ED Course User Index  [DD] Prudencio Martinez MD         The patient was evaluated during the global COVID-19 pandemic, and that diagnosis was considered upon their initial presentation.  Their evaluation, treatment and testing was consistent with current guidelines for patients who present with complaints or symptoms that may be related to COVID-19. Strict returnprecautions and follow up instructions were discussed with the patient with which the patient agrees        ED Medications administered this visit:    Medications   0.9 % sodium chloride bolus (0 mLs Intravenous Stopped 5/21/21 2008)   0.9 % sodium chloride bolus (0 mLs Intravenous Stopped 5/21/21 2112)   magnesium sulfate 2000 mg in 50 mL IVPB premix (0 mg Intravenous Stopped 5/21/21 2339)         Procedures: (None if blank)       CLINICAL       1. Dehydration    2.  Hypomagnesemia          DISPOSITION/PLAN   DISPOSITION Decision To Discharge 05/21/2021 11:39:04 PM      PATIENT REFERRED TO:  DEV Christensen - [address]  [phone]    Schedule an appointment as soon as possible for a visit   If symptoms worsen      DISCHARGE MEDICATIONS:  Discharge Medication List as of 5/21/2021 10:48 PM                 (Please note that portions of this note were completed with a voice recognition program.  Efforts were made to edit the dictations but occasionallywords are mis-transcribed.)      Electronically signed by Cole Lilly MD on 5/21/21 at 9:14 PM EDT    Attending Physician, Emergency Department       Augustus Joshua MD  05/22/21 6172

## 2021-05-22 NOTE — ED NOTES
Patient resting in bed. Respirations easy and unlabored. No distress noted. Call light within reach.        Shamar Linda RN  05/21/21 4588

## 2021-05-22 NOTE — ED NOTES
Patient resting in bed. Respirations easy and unlabored. No distress noted. Call light within reach.        Bennie Osborn RN  05/21/21 4817

## 2021-07-14 ENCOUNTER — TELEPHONE (OUTPATIENT)
Dept: FAMILY MEDICINE CLINIC | Age: 56
End: 2021-07-14

## 2021-07-14 ENCOUNTER — OFFICE VISIT (OUTPATIENT)
Dept: FAMILY MEDICINE CLINIC | Age: 56
End: 2021-07-14
Payer: COMMERCIAL

## 2021-07-14 ENCOUNTER — HOSPITAL ENCOUNTER (OUTPATIENT)
Age: 56
Discharge: HOME | End: 2021-07-14
Payer: COMMERCIAL

## 2021-07-14 ENCOUNTER — HOSPITAL ENCOUNTER (OUTPATIENT)
Dept: GENERAL RADIOLOGY | Age: 56
Discharge: HOME | End: 2021-07-14
Payer: COMMERCIAL

## 2021-07-14 VITALS
OXYGEN SATURATION: 95 % | WEIGHT: 182 LBS | HEART RATE: 86 BPM | BODY MASS INDEX: 25.48 KG/M2 | RESPIRATION RATE: 18 BRPM | DIASTOLIC BLOOD PRESSURE: 88 MMHG | TEMPERATURE: 98.6 F | SYSTOLIC BLOOD PRESSURE: 124 MMHG | HEIGHT: 71 IN

## 2021-07-14 DIAGNOSIS — Z79.4 UNCONTROLLED TYPE 2 DIABETES MELLITUS WITH HYPERGLYCEMIA, WITH LONG-TERM CURRENT USE OF INSULIN (HCC): ICD-10-CM

## 2021-07-14 DIAGNOSIS — F32.9 MAJOR DEPRESSIVE DISORDER WITH CURRENT ACTIVE EPISODE, UNSPECIFIED DEPRESSION EPISODE SEVERITY, UNSPECIFIED WHETHER RECURRENT: Primary | ICD-10-CM

## 2021-07-14 DIAGNOSIS — S81.801A WOUND OF RIGHT LOWER EXTREMITY, INITIAL ENCOUNTER: ICD-10-CM

## 2021-07-14 DIAGNOSIS — R10.84 GENERALIZED ABDOMINAL PAIN: ICD-10-CM

## 2021-07-14 DIAGNOSIS — E11.65 UNCONTROLLED TYPE 2 DIABETES MELLITUS WITH HYPERGLYCEMIA, WITH LONG-TERM CURRENT USE OF INSULIN (HCC): ICD-10-CM

## 2021-07-14 DIAGNOSIS — R10.84 GENERALIZED ABDOMINAL PAIN: Primary | ICD-10-CM

## 2021-07-14 LAB
ALBUMIN SERPL-MCNC: 4.3 G/DL (ref 3.5–5.1)
ALP BLD-CCNC: 105 U/L (ref 38–126)
ALT SERPL-CCNC: 9 U/L (ref 11–66)
ANION GAP SERPL CALCULATED.3IONS-SCNC: 12 MEQ/L (ref 8–16)
AST SERPL-CCNC: 12 U/L (ref 5–40)
AVERAGE GLUCOSE: 228 MG/DL (ref 70–126)
BASOPHILS # BLD: 0.5 %
BASOPHILS ABSOLUTE: 0.1 THOU/MM3 (ref 0–0.1)
BILIRUB SERPL-MCNC: 0.6 MG/DL (ref 0.3–1.2)
BUN BLDV-MCNC: 10 MG/DL (ref 7–22)
CALCIUM SERPL-MCNC: 10.4 MG/DL (ref 8.5–10.5)
CHLORIDE BLD-SCNC: 97 MEQ/L (ref 98–111)
CO2: 28 MEQ/L (ref 23–33)
CREAT SERPL-MCNC: 1.2 MG/DL (ref 0.4–1.2)
EOSINOPHIL # BLD: 3 %
EOSINOPHILS ABSOLUTE: 0.4 THOU/MM3 (ref 0–0.4)
ERYTHROCYTE [DISTWIDTH] IN BLOOD BY AUTOMATED COUNT: 13.1 % (ref 11.5–14.5)
ERYTHROCYTE [DISTWIDTH] IN BLOOD BY AUTOMATED COUNT: 39 FL (ref 35–45)
GFR SERPL CREATININE-BSD FRML MDRD: 63 ML/MIN/1.73M2
GLUCOSE BLD-MCNC: 219 MG/DL (ref 70–108)
HBA1C MFR BLD: 9.6 % (ref 4.4–6.4)
HCT VFR BLD CALC: 45.3 % (ref 42–52)
HEMOGLOBIN: 15.9 GM/DL (ref 14–18)
IMMATURE GRANS (ABS): 0.04 THOU/MM3 (ref 0–0.07)
IMMATURE GRANULOCYTES: 0.3 %
LYMPHOCYTES # BLD: 19.6 %
LYMPHOCYTES ABSOLUTE: 2.5 THOU/MM3 (ref 1–4.8)
MCH RBC QN AUTO: 29.3 PG (ref 26–33)
MCHC RBC AUTO-ENTMCNC: 35.1 GM/DL (ref 32.2–35.5)
MCV RBC AUTO: 83.6 FL (ref 80–94)
MONOCYTES # BLD: 6.8 %
MONOCYTES ABSOLUTE: 0.9 THOU/MM3 (ref 0.4–1.3)
NUCLEATED RED BLOOD CELLS: 0 /100 WBC
PLATELET # BLD: 250 THOU/MM3 (ref 130–400)
PMV BLD AUTO: 10.9 FL (ref 9.4–12.4)
POTASSIUM SERPL-SCNC: 4.3 MEQ/L (ref 3.5–5.2)
RBC # BLD: 5.42 MILL/MM3 (ref 4.7–6.1)
SEG NEUTROPHILS: 69.8 %
SEGMENTED NEUTROPHILS ABSOLUTE COUNT: 8.8 THOU/MM3 (ref 1.8–7.7)
SODIUM BLD-SCNC: 137 MEQ/L (ref 135–145)
TOTAL PROTEIN: 7.6 G/DL (ref 6.1–8)
WBC # BLD: 12.6 THOU/MM3 (ref 4.8–10.8)

## 2021-07-14 PROCEDURE — 85025 COMPLETE CBC W/AUTO DIFF WBC: CPT

## 2021-07-14 PROCEDURE — G8419 CALC BMI OUT NRM PARAM NOF/U: HCPCS | Performed by: NURSE PRACTITIONER

## 2021-07-14 PROCEDURE — 74018 RADEX ABDOMEN 1 VIEW: CPT

## 2021-07-14 PROCEDURE — 99205 OFFICE O/P NEW HI 60 MIN: CPT | Performed by: NURSE PRACTITIONER

## 2021-07-14 PROCEDURE — 3046F HEMOGLOBIN A1C LEVEL >9.0%: CPT | Performed by: NURSE PRACTITIONER

## 2021-07-14 PROCEDURE — 83036 HEMOGLOBIN GLYCOSYLATED A1C: CPT

## 2021-07-14 PROCEDURE — 80053 COMPREHEN METABOLIC PANEL: CPT

## 2021-07-14 PROCEDURE — 4004F PT TOBACCO SCREEN RCVD TLK: CPT | Performed by: NURSE PRACTITIONER

## 2021-07-14 PROCEDURE — 36415 COLL VENOUS BLD VENIPUNCTURE: CPT

## 2021-07-14 PROCEDURE — 2022F DILAT RTA XM EVC RTNOPTHY: CPT | Performed by: NURSE PRACTITIONER

## 2021-07-14 PROCEDURE — G8427 DOCREV CUR MEDS BY ELIG CLIN: HCPCS | Performed by: NURSE PRACTITIONER

## 2021-07-14 PROCEDURE — 3017F COLORECTAL CA SCREEN DOC REV: CPT | Performed by: NURSE PRACTITIONER

## 2021-07-14 RX ORDER — FLASH GLUCOSE SENSOR
1 KIT MISCELLANEOUS
Qty: 1 DEVICE | Refills: 0 | Status: SHIPPED | OUTPATIENT
Start: 2021-07-14 | End: 2022-06-21 | Stop reason: SDUPTHER

## 2021-07-14 RX ORDER — DULOXETIN HYDROCHLORIDE 60 MG/1
60 CAPSULE, DELAYED RELEASE ORAL DAILY
Qty: 90 CAPSULE | Refills: 1 | Status: SHIPPED | OUTPATIENT
Start: 2021-07-14 | End: 2022-04-11 | Stop reason: SDUPTHER

## 2021-07-14 RX ORDER — DOXYCYCLINE HYCLATE 100 MG
100 TABLET ORAL 2 TIMES DAILY
Qty: 28 TABLET | Refills: 0 | Status: SHIPPED | OUTPATIENT
Start: 2021-07-14 | End: 2021-07-28

## 2021-07-14 RX ORDER — FLASH GLUCOSE SENSOR
1 KIT MISCELLANEOUS
Qty: 1 EACH | Refills: 0 | Status: SHIPPED | OUTPATIENT
Start: 2021-07-14 | End: 2022-06-21 | Stop reason: SDUPTHER

## 2021-07-14 SDOH — ECONOMIC STABILITY: FOOD INSECURITY: WITHIN THE PAST 12 MONTHS, YOU WORRIED THAT YOUR FOOD WOULD RUN OUT BEFORE YOU GOT MONEY TO BUY MORE.: NEVER TRUE

## 2021-07-14 SDOH — ECONOMIC STABILITY: FOOD INSECURITY: WITHIN THE PAST 12 MONTHS, THE FOOD YOU BOUGHT JUST DIDN'T LAST AND YOU DIDN'T HAVE MONEY TO GET MORE.: NEVER TRUE

## 2021-07-14 ASSESSMENT — PATIENT HEALTH QUESTIONNAIRE - PHQ9
SUM OF ALL RESPONSES TO PHQ9 QUESTIONS 1 & 2: 6
3. TROUBLE FALLING OR STAYING ASLEEP: 3
SUM OF ALL RESPONSES TO PHQ QUESTIONS 1-9: 24
8. MOVING OR SPEAKING SO SLOWLY THAT OTHER PEOPLE COULD HAVE NOTICED. OR THE OPPOSITE, BEING SO FIGETY OR RESTLESS THAT YOU HAVE BEEN MOVING AROUND A LOT MORE THAN USUAL: 3
7. TROUBLE CONCENTRATING ON THINGS, SUCH AS READING THE NEWSPAPER OR WATCHING TELEVISION: 3
4. FEELING TIRED OR HAVING LITTLE ENERGY: 3
5. POOR APPETITE OR OVEREATING: 3
2. FEELING DOWN, DEPRESSED OR HOPELESS: 3
SUM OF ALL RESPONSES TO PHQ9 QUESTIONS 1 & 2: 6
1. LITTLE INTEREST OR PLEASURE IN DOING THINGS: 3
10. IF YOU CHECKED OFF ANY PROBLEMS, HOW DIFFICULT HAVE THESE PROBLEMS MADE IT FOR YOU TO DO YOUR WORK, TAKE CARE OF THINGS AT HOME, OR GET ALONG WITH OTHER PEOPLE: 2
6. FEELING BAD ABOUT YOURSELF - OR THAT YOU ARE A FAILURE OR HAVE LET YOURSELF OR YOUR FAMILY DOWN: 3
10. IF YOU CHECKED OFF ANY PROBLEMS, HOW DIFFICULT HAVE THESE PROBLEMS MADE IT FOR YOU TO DO YOUR WORK, TAKE CARE OF THINGS AT HOME, OR GET ALONG WITH OTHER PEOPLE: 1
SUM OF ALL RESPONSES TO PHQ QUESTIONS 1-9: 20
5. POOR APPETITE OR OVEREATING: 3
DEPRESSION UNABLE TO ASSESS: FUNCTIONAL CAPACITY MOTIVATION LIMITS ACCURACY
1. LITTLE INTEREST OR PLEASURE IN DOING THINGS: 3
3. TROUBLE FALLING OR STAYING ASLEEP: 3
DEPRESSION UNABLE TO ASSESS: FUNCTIONAL CAPACITY MOTIVATION LIMITS ACCURACY
SUM OF ALL RESPONSES TO PHQ QUESTIONS 1-9: 18
SUM OF ALL RESPONSES TO PHQ QUESTIONS 1-9: 27
SUM OF ALL RESPONSES TO PHQ QUESTIONS 1-9: 20
4. FEELING TIRED OR HAVING LITTLE ENERGY: 3
9. THOUGHTS THAT YOU WOULD BE BETTER OFF DEAD, OR OF HURTING YOURSELF: 2
8. MOVING OR SPEAKING SO SLOWLY THAT OTHER PEOPLE COULD HAVE NOTICED. OR THE OPPOSITE, BEING SO FIGETY OR RESTLESS THAT YOU HAVE BEEN MOVING AROUND A LOT MORE THAN USUAL: 1
9. THOUGHTS THAT YOU WOULD BE BETTER OFF DEAD, OR OF HURTING YOURSELF: 3
2. FEELING DOWN, DEPRESSED OR HOPELESS: 3
6. FEELING BAD ABOUT YOURSELF - OR THAT YOU ARE A FAILURE OR HAVE LET YOURSELF OR YOUR FAMILY DOWN: 2
SUM OF ALL RESPONSES TO PHQ QUESTIONS 1-9: 27

## 2021-07-14 ASSESSMENT — SOCIAL DETERMINANTS OF HEALTH (SDOH)
HOW HARD IS IT FOR YOU TO PAY FOR THE VERY BASICS LIKE FOOD, HOUSING, MEDICAL CARE, AND HEATING?: NOT HARD AT ALL
HOW HARD IS IT FOR YOU TO PAY FOR THE VERY BASICS LIKE FOOD, HOUSING, MEDICAL CARE, AND HEATING?: NOT HARD AT ALL

## 2021-07-14 ASSESSMENT — ENCOUNTER SYMPTOMS
ABDOMINAL PAIN: 1
CONSTIPATION: 1

## 2021-07-14 ASSESSMENT — COLUMBIA-SUICIDE SEVERITY RATING SCALE - C-SSRS
6. HAVE YOU EVER DONE ANYTHING, STARTED TO DO ANYTHING, OR PREPARED TO DO ANYTHING TO END YOUR LIFE?: NO
6. HAVE YOU EVER DONE ANYTHING, STARTED TO DO ANYTHING, OR PREPARED TO DO ANYTHING TO END YOUR LIFE?: NO
2. HAVE YOU ACTUALLY HAD ANY THOUGHTS OF KILLING YOURSELF?: NO
2. HAVE YOU ACTUALLY HAD ANY THOUGHTS OF KILLING YOURSELF?: NO
1. WITHIN THE PAST MONTH, HAVE YOU WISHED YOU WERE DEAD OR WISHED YOU COULD GO TO SLEEP AND NOT WAKE UP?: NO
1. WITHIN THE PAST MONTH, HAVE YOU WISHED YOU WERE DEAD OR WISHED YOU COULD GO TO SLEEP AND NOT WAKE UP?: NO

## 2021-07-14 NOTE — PROGRESS NOTES
[address]   [address] [address]  Dept: [phone]  Loc: [phone]  Visit Date: 7/14/2021      Chief Complaint:   Mony Armstrong is a 48 y.o. male thatpresents for Abdominal Pain (x 2 weeks  all day ) and Leg Pain (rt sided ongoing one year )        HPI:  Abdominal pain  Started in past couple weeks  Reports no BM for few days  Says he will have BM and not go for few days  At times formed, sometimes diarrhea  No fever or chills  No N, V  Appetite good    Right leg pain  Been going on for mos  Multiple ulcers on shins  Wife reports he continues to pick at them  Had this in the past  Had surgery by Dr. Anders Ashton and followed with Dr. Jordyn Lawrence in the past for this  Hx DM, not well controlled, doesn't check sugars, unsure of when last A1c is, on Lantus daily and Humalog TID  Sister gave him Silvadene cream a couple days ago, says it has already improved since then    Depression  Flagged depression screening  Reports mood has been down, decreased energy and motivation  Started a few weeks ago  Feels like its mostly attributed to him not feeling well physically and these wounds persisting  Was on Cymbalta, stopped this in the past couple weeks, unsure of dose    The patient comes in with his wife today. History obtained from pt, wife, and medical records. I have reviewed the patient's past medical history, past surgical history, allergies,medications, social and family history and I have made updates where appropriate.     Past Medical History:   Diagnosis Date    Abscess of leg     Allergic rhinitis     Asthma     Blood circulation, collateral     numbness in hands and feet    GERD (gastroesophageal reflux disease)     Headache(784.0)     Hyperlipidemia     Hypertension     Neuropathy     Osteoarthritis     Other disorders of kidney and ureter     Pneumonia     Type II or unspecified type diabetes mellitus without mention of complication, not stated as uncontrolled     type 2       Past Surgical History:   Procedure Laterality Date    APPENDECTOMY  12    bree [address] SURGERY      CARDIAC CATHETERIZATION  2007    WNL, no stents     COLONOSCOPY Left 2018    COLONOSCOPY POLYPECTOMY HOT BIOPSY performed by Zara Donaldson MD at [facility] Right 2008    Rt leg    HAND SURGERY Left 2010    VASCULAR SURGERY      pinched nerve left arm       Social History     Tobacco Use    Smoking status: Current Some Day Smoker     Packs/day: 1.00     Years: 30.00     Pack years: 30.00     Types: Cigars     Start date: 10/18/1980     Last attempt to quit: 10/18/2004     Years since quittin.7    Smokeless tobacco: Never Used    Tobacco comment: 5 cigars per day   Substance Use Topics    Alcohol use: No     Comment: occassionally    Drug use: No       Family History   Problem Relation Age of Onset    High Blood Pressure Mother     Heart Disease Father     Cancer Maternal Aunt         LIVER    Colon Polyps Neg Hx          Review of Systems   Gastrointestinal: Positive for abdominal pain and constipation. Skin: Positive for wound. Psychiatric/Behavioral:        Depression             PHYSICAL EXAM:  /88   Pulse 86   Temp 98.6 °F (37 °C)   Resp 18   Ht 5' 11\" (1.803 m)   Wt 182 lb (82.6 kg)   SpO2 95%   BMI 25.38 kg/m²     Physical Exam  Constitutional:       Appearance: Normal appearance. HENT:      Head: Normocephalic and atraumatic. Right Ear: External ear normal.      Left Ear: External ear normal.      Nose: Nose normal.      Mouth/Throat:      Mouth: Mucous membranes are moist.   Eyes:      Conjunctiva/sclera: Conjunctivae normal.   Cardiovascular:      Rate and Rhythm: Normal rate and regular rhythm. Pulses: Normal pulses. Heart sounds: Normal heart sounds. Pulmonary:      Effort: Pulmonary effort is normal.      Breath sounds: Normal breath sounds. Abdominal:      General: Bowel sounds are normal.      Palpations: Abdomen is soft. Tenderness: There is no abdominal tenderness. There is no right CVA tenderness or left CVA tenderness. Comments: Distention is minimal   Musculoskeletal:         General: Normal range of motion. Cervical back: Normal range of motion. Skin:     General: Skin is warm and dry. Comments: Right leg with multiple ulcers, two dime sized, 1 about the size of a fifty cent piece, yellow exudate present in wound bed, skin surrounding wound edges is red    Left leg ulcer 3 mm, scabbed over, no erythema   Neurological:      General: No focal deficit present. Mental Status: He is alert and oriented to person, place, and time. Psychiatric:         Mood and Affect: Mood normal.         Behavior: Behavior normal.         Thought Content: Thought content normal.         Judgment: Judgment normal.      Comments: Denies SI/HI         ASSESSMENT & PLAN  Nonda Goodell was seen today for abdominal pain and leg pain. Diagnoses and all orders for this visit:    Generalized abdominal pain  -     XR ABDOMEN (KUB) (SINGLE AP VIEW); Future    Wound of right lower extremity, initial encounter  -     [facility] Ost[facility]  -     CBC Auto Differential; Future  -     Comprehensive Metabolic Panel; Future  -     doxycycline hyclate (VIBRA-TABS) 100 MG tablet; Take 1 tablet by mouth 2 times daily for 14 days  -     mupirocin (BACTROBAN) 2 % ointment; Apply 3 times daily.     Uncontrolled type 2 diabetes mellitus with hyperglycemia, with long-term current use of insulin ([facility])  -     Hemoglobin A1C; Future  -     Continuous Blood Gluc  (FREESTYLE YUMI READER) JESU; 1 Device by Does not apply route 3 times daily (before meals)  -     Continuous Blood Gluc Sensor ([address] [place] [address]) MISC; 1 Device by Does not apply route 3 times daily (before meals)      Abdominal pain  Symptoms are vague  No red flag sx or findings on exam  Will get KUB d/t him reporting some trouble with constipation  Will treat accordingly    Leg wounds  Start Doxy BID x 14 days  Sent Bactroban  NO PICKING   ER precautions given  Follow up 1 week    DM  Labs today  Freestyle Sandra sent in  If denied, they have new insurance in few weeks, will send again  Long discussion regarding importance of managing his DM properly- check BS regularly, tighten glucose control for healing, dietary changes, and consistent follow up  Will see what A1c shows    Depression  Restart Cymbalta  Will have staff verify dose- Epic shows 60 mg daily, doesn't sound right to pt      No follow-ups on file. I spent 59 minutes with the patient discussing symptoms, medications and side effects, treatment options, performing an exam, reviewing previous notes and tests, and developing a plan of care. All questions answered. Patient and spouse verbalized understanding.  Friendly received counseling on the following healthy behaviors: nutrition, exercise, medication adherence, tobacco cessation and glucose control  Reviewedprior labs and health maintenance. Continue current medications, diet and exercise. Discussed use, benefit, and side effects of prescribed medications. Barriers to medication compliance addressed. Patient given educationalmaterials - see patient instructions. All patient questions answered. Patient voiced understanding.      Electronically signed by DEV Zacarias - CNP, APRN on 7/14/21 at 12:03 PM EDT

## 2021-07-14 NOTE — TELEPHONE ENCOUNTER
Please call [address] and verify Cymbalta dose, pt unsure, ran out, want to restart it  Electronically signed by DEV Blackburn - CNP on 7/14/2021 at 12:43 PM

## 2021-07-15 ENCOUNTER — TELEPHONE (OUTPATIENT)
Dept: FAMILY MEDICINE CLINIC | Age: 56
End: 2021-07-15

## 2021-07-15 NOTE — TELEPHONE ENCOUNTER
----- Message from DEV Jean Baptiste CNP sent at 7/15/2021  8:18 AM EDT -----  Please call pt and let him know his xray showed constipation. Start Miralax this morning, 1 capful in water, coffee, or juice. Start Colace 1 tab this am.  Push water. Continue both daily. Both are OTC. Still awaiting his labs.     Electronically signed by DEV Jean Baptiste CNP on 7/15/2021 at 8:17 AM

## 2021-07-16 ENCOUNTER — TELEPHONE (OUTPATIENT)
Dept: FAMILY MEDICINE CLINIC | Age: 56
End: 2021-07-16

## 2021-07-16 NOTE — TELEPHONE ENCOUNTER
----- Message from DEV Abel CNP sent at 7/15/2021  8:31 AM EDT -----  Labs are back. WBC was slightly elevated, likely d/t his wounds. A1c was elevated at 9.6.  recommend he begin checking his sugars regularly. Drink 2-3 liters of water per day. Avoid sweetened drinks- pop, teas. Limit sugars and starches- sweets, potatoes, pastas, breads, etc.  Eat lean meats, plenty of veggies. Walk 30 min at least 3 days a week. Please get his last few office notes from [place] [facility] office so I can get his med list updated and a little more accurate. We made need to make some med changes.   Electronically signed by DEV Abel CNP on 7/15/2021 at 8:31 AM

## 2021-07-19 NOTE — TELEPHONE ENCOUNTER
Patient informed will understanding voiced. be in to sign release to obtain records from [place] office.

## 2021-07-29 ENCOUNTER — TELEPHONE (OUTPATIENT)
Dept: WOUND CARE | Age: 56
End: 2021-07-29

## 2021-08-19 ENCOUNTER — TELEPHONE (OUTPATIENT)
Dept: WOUND CARE | Age: 56
End: 2021-08-19

## 2021-09-08 ENCOUNTER — APPOINTMENT (OUTPATIENT)
Dept: GENERAL RADIOLOGY | Age: 56
End: 2021-09-08
Payer: COMMERCIAL

## 2021-09-08 ENCOUNTER — HOSPITAL ENCOUNTER (EMERGENCY)
Age: 56
Discharge: HOME | End: 2021-09-08
Attending: EMERGENCY MEDICINE
Payer: COMMERCIAL

## 2021-09-08 VITALS
HEIGHT: 70 IN | RESPIRATION RATE: 17 BRPM | HEART RATE: 83 BPM | SYSTOLIC BLOOD PRESSURE: 135 MMHG | DIASTOLIC BLOOD PRESSURE: 74 MMHG | TEMPERATURE: 97.7 F | BODY MASS INDEX: 24.77 KG/M2 | WEIGHT: 173 LBS | OXYGEN SATURATION: 94 %

## 2021-09-08 DIAGNOSIS — R07.89 ATYPICAL CHEST PAIN: Primary | ICD-10-CM

## 2021-09-08 DIAGNOSIS — K21.9 GASTROESOPHAGEAL REFLUX DISEASE WITHOUT ESOPHAGITIS: ICD-10-CM

## 2021-09-08 DIAGNOSIS — K29.90 GASTRITIS AND DUODENITIS: ICD-10-CM

## 2021-09-08 LAB
ALBUMIN SERPL-MCNC: 4.5 G/DL (ref 3.5–5.1)
ALP BLD-CCNC: 106 U/L (ref 38–126)
ALT SERPL-CCNC: 7 U/L (ref 11–66)
ANION GAP SERPL CALCULATED.3IONS-SCNC: 13 MEQ/L (ref 8–16)
AST SERPL-CCNC: 9 U/L (ref 5–40)
BASOPHILS # BLD: 0.4 %
BASOPHILS ABSOLUTE: 0 THOU/MM3 (ref 0–0.1)
BILIRUB SERPL-MCNC: 0.6 MG/DL (ref 0.3–1.2)
BUN BLDV-MCNC: 9 MG/DL (ref 7–22)
CALCIUM SERPL-MCNC: 9.7 MG/DL (ref 8.5–10.5)
CHLORIDE BLD-SCNC: 95 MEQ/L (ref 98–111)
CO2: 27 MEQ/L (ref 23–33)
CREAT SERPL-MCNC: 1 MG/DL (ref 0.4–1.2)
EKG ATRIAL RATE: 82 BPM
EKG P AXIS: 58 DEGREES
EKG P-R INTERVAL: 184 MS
EKG Q-T INTERVAL: 354 MS
EKG QRS DURATION: 88 MS
EKG QTC CALCULATION (BAZETT): 413 MS
EKG R AXIS: -2 DEGREES
EKG T AXIS: 33 DEGREES
EKG VENTRICULAR RATE: 82 BPM
EOSINOPHIL # BLD: 3.2 %
EOSINOPHILS ABSOLUTE: 0.3 THOU/MM3 (ref 0–0.4)
ERYTHROCYTE [DISTWIDTH] IN BLOOD BY AUTOMATED COUNT: 13.1 % (ref 11.5–14.5)
ERYTHROCYTE [DISTWIDTH] IN BLOOD BY AUTOMATED COUNT: 39.2 FL (ref 35–45)
GFR SERPL CREATININE-BSD FRML MDRD: 78 ML/MIN/1.73M2
GLUCOSE BLD-MCNC: 298 MG/DL (ref 70–108)
HCT VFR BLD CALC: 36.6 % (ref 42–52)
HEMOGLOBIN: 13.1 GM/DL (ref 14–18)
IMMATURE GRANS (ABS): 0.03 THOU/MM3 (ref 0–0.07)
IMMATURE GRANULOCYTES: 0.3 %
LYMPHOCYTES # BLD: 19.6 %
LYMPHOCYTES ABSOLUTE: 2 THOU/MM3 (ref 1–4.8)
MCH RBC QN AUTO: 29.6 PG (ref 26–33)
MCHC RBC AUTO-ENTMCNC: 35.8 GM/DL (ref 32.2–35.5)
MCV RBC AUTO: 82.8 FL (ref 80–94)
MONOCYTES # BLD: 7.1 %
MONOCYTES ABSOLUTE: 0.7 THOU/MM3 (ref 0.4–1.3)
NUCLEATED RED BLOOD CELLS: 0 /100 WBC
OSMOLALITY CALCULATION: 279.9 MOSMOL/KG (ref 275–300)
PLATELET # BLD: 175 THOU/MM3 (ref 130–400)
PMV BLD AUTO: 10.3 FL (ref 9.4–12.4)
POTASSIUM SERPL-SCNC: 3.6 MEQ/L (ref 3.5–5.2)
PRO-BNP: 594.3 PG/ML (ref 0–900)
RBC # BLD: 4.42 MILL/MM3 (ref 4.7–6.1)
REASON FOR REJECTION: NORMAL
REJECTED TEST: NORMAL
SEG NEUTROPHILS: 69.4 %
SEGMENTED NEUTROPHILS ABSOLUTE COUNT: 7.2 THOU/MM3 (ref 1.8–7.7)
SODIUM BLD-SCNC: 135 MEQ/L (ref 135–145)
TOTAL PROTEIN: 7.6 G/DL (ref 6.1–8)
TROPONIN T: < 0.01 NG/ML
TROPONIN T: < 0.01 NG/ML
WBC # BLD: 10.4 THOU/MM3 (ref 4.8–10.8)

## 2021-09-08 PROCEDURE — 6370000000 HC RX 637 (ALT 250 FOR IP): Performed by: EMERGENCY MEDICINE

## 2021-09-08 PROCEDURE — 84484 ASSAY OF TROPONIN QUANT: CPT

## 2021-09-08 PROCEDURE — 2500000003 HC RX 250 WO HCPCS: Performed by: EMERGENCY MEDICINE

## 2021-09-08 PROCEDURE — 6360000002 HC RX W HCPCS: Performed by: EMERGENCY MEDICINE

## 2021-09-08 PROCEDURE — 71046 X-RAY EXAM CHEST 2 VIEWS: CPT

## 2021-09-08 PROCEDURE — 99284 EMERGENCY DEPT VISIT MOD MDM: CPT

## 2021-09-08 PROCEDURE — 80053 COMPREHEN METABOLIC PANEL: CPT

## 2021-09-08 PROCEDURE — 96374 THER/PROPH/DIAG INJ IV PUSH: CPT

## 2021-09-08 PROCEDURE — 36415 COLL VENOUS BLD VENIPUNCTURE: CPT

## 2021-09-08 PROCEDURE — 85025 COMPLETE CBC W/AUTO DIFF WBC: CPT

## 2021-09-08 PROCEDURE — 96375 TX/PRO/DX INJ NEW DRUG ADDON: CPT

## 2021-09-08 PROCEDURE — 93005 ELECTROCARDIOGRAM TRACING: CPT | Performed by: EMERGENCY MEDICINE

## 2021-09-08 PROCEDURE — 83880 ASSAY OF NATRIURETIC PEPTIDE: CPT

## 2021-09-08 RX ORDER — HYDRALAZINE HYDROCHLORIDE 20 MG/ML
20 INJECTION INTRAMUSCULAR; INTRAVENOUS ONCE
Status: COMPLETED | OUTPATIENT
Start: 2021-09-08 | End: 2021-09-08

## 2021-09-08 RX ORDER — CALCIUM CARBONATE 200(500)MG
1 TABLET,CHEWABLE ORAL DAILY
Qty: 30 TABLET | Refills: 0 | Status: SHIPPED | OUTPATIENT
Start: 2021-09-08 | End: 2021-10-08

## 2021-09-08 RX ORDER — PANTOPRAZOLE SODIUM 40 MG/1
40 TABLET, DELAYED RELEASE ORAL
Qty: 30 TABLET | Refills: 0 | Status: SHIPPED | OUTPATIENT
Start: 2021-09-08 | End: 2022-04-11 | Stop reason: SDUPTHER

## 2021-09-08 RX ORDER — METOPROLOL TARTRATE 5 MG/5ML
5 INJECTION INTRAVENOUS ONCE
Status: COMPLETED | OUTPATIENT
Start: 2021-09-08 | End: 2021-09-08

## 2021-09-08 RX ORDER — ONDANSETRON 4 MG/1
4 TABLET, FILM COATED ORAL 3 TIMES DAILY PRN
Qty: 15 TABLET | Refills: 0 | Status: SHIPPED | OUTPATIENT
Start: 2021-09-08

## 2021-09-08 RX ORDER — ONDANSETRON 4 MG/1
4 TABLET, ORALLY DISINTEGRATING ORAL ONCE
Status: COMPLETED | OUTPATIENT
Start: 2021-09-08 | End: 2021-09-08

## 2021-09-08 RX ORDER — PANTOPRAZOLE SODIUM 40 MG/1
40 TABLET, DELAYED RELEASE ORAL ONCE
Status: COMPLETED | OUTPATIENT
Start: 2021-09-08 | End: 2021-09-08

## 2021-09-08 RX ADMIN — METOPROLOL TARTRATE 5 MG: 5 INJECTION INTRAVENOUS at 03:18

## 2021-09-08 RX ADMIN — HYDRALAZINE HYDROCHLORIDE 20 MG: 20 INJECTION INTRAMUSCULAR; INTRAVENOUS at 04:37

## 2021-09-08 RX ADMIN — LIDOCAINE HYDROCHLORIDE: 20 SOLUTION ORAL; TOPICAL at 02:46

## 2021-09-08 RX ADMIN — PANTOPRAZOLE SODIUM 40 MG: 40 TABLET, DELAYED RELEASE ORAL at 02:46

## 2021-09-08 RX ADMIN — ONDANSETRON 4 MG: 4 TABLET, ORALLY DISINTEGRATING ORAL at 02:46

## 2021-09-08 ASSESSMENT — ENCOUNTER SYMPTOMS
DIARRHEA: 0
SHORTNESS OF BREATH: 0
NAUSEA: 1
EYE REDNESS: 0
BLOOD IN STOOL: 0
EYE ITCHING: 0
VOICE CHANGE: 0
EYE DISCHARGE: 0
ABDOMINAL DISTENTION: 0
COUGH: 0
ABDOMINAL PAIN: 1
CHOKING: 0
CHEST TIGHTNESS: 0
SINUS PRESSURE: 0
VOMITING: 0
WHEEZING: 0
SORE THROAT: 0
BACK PAIN: 0
TROUBLE SWALLOWING: 0
CONSTIPATION: 0
RHINORRHEA: 0
EYE PAIN: 0
PHOTOPHOBIA: 0

## 2021-09-08 NOTE — ED TRIAGE NOTES
Patient arrives to the ED with c/o right sided chest and abdominal pain described as sharp, pt took tylenol and aleeve with no effect. Denies any falls or trauma, SOB and N/V. GCS 15.

## 2021-09-08 NOTE — ED PROVIDER NOTES
[facility]  eMERGENCY dEPARTMENT eNCOUnter          CHIEF COMPLAINT       Chief Complaint   Patient presents with    Chest Pain       Nurses Notes reviewed and I agree except as noted in the HPI. HISTORY OF PRESENT ILLNESS    Natasha Navarrete is a 48 y.o. male who presents with what appears to be 2 weeks of chest pain. This is most prominent in his epigastric region and right upper quadrant. It does cause some pain into his right-sided chest.  Patient has had work-up for chest pain before. He has had a cath in 2012 which was completely negative. Patient does have a history of GERD. He states that there is some association with food with this. Patient still has his gallbladder. Currently the patient is resting comfortably on the cot no apparent distress. When I asked him what brought him in in the middle of the night he states that it would not let him sleep so he decided to come in for further evaluation and treatment. Patient is currently hemodynamically stable. He is hypertensive. He states he takes his medication but has not recently. Patient is not complaining of any headache dizziness nausea or vomiting. Cardiac catheterization  CONCLUSION:      1. Left main large caliber vessel, widely patent. 2.    Circumflex is a codominant system, large caliber vessel, widely             patent. 3.   Ramus intermedius large caliber vessel, widely patent. 4.   Type 2 LAD, large caliber vessel, widely patent. 5.   RCA has a codominant system, large caliber vessel, also widely             patent. 6.   No complications occurred. It appears that the patient stress test             results may have been falsely positive and for that reason we             still recommend aggressive risk factor modification, weight loss,             blood pressure control and aggressive diabetes management.   These             results were discussed with the patient and the family who were             very appreciative of the workup. Melissa Min D.O.        D: 09/24/2012 13:24                                   T: 09/24/2012 17:37  crs     CC:  Melissa Min D.O. [facility], [address]    Echocardiogram   Conclusions      Summary   Systolic function was normal.   Ejection fraction is visually estimated at 55%. Signature      ----------------------------------------------------------------   Electronically signed by Chico Jane DO (Interpreting   physician) on 07/11/2017 at 06:35 PM   ----------------------------------------------------------------      REVIEW OF SYSTEMS     Review of Systems   Constitutional: Negative for activity change, appetite change, diaphoresis, fatigue and unexpected weight change. HENT: Negative for congestion, ear discharge, ear pain, hearing loss, rhinorrhea, sinus pressure, sore throat, trouble swallowing and voice change. Eyes: Negative for photophobia, pain, discharge, redness and itching. Respiratory: Negative for cough, choking, chest tightness, shortness of breath and wheezing. Cardiovascular: Positive for chest pain. Negative for palpitations and leg swelling. Gastrointestinal: Positive for abdominal pain and nausea. Negative for abdominal distention, blood in stool, constipation, diarrhea and vomiting. Endocrine: Negative for polydipsia, polyphagia and polyuria. Genitourinary: Negative for decreased urine volume, difficulty urinating, dysuria, enuresis, frequency, hematuria and urgency. Musculoskeletal: Negative for arthralgias, back pain, gait problem, myalgias, neck pain and neck stiffness. Skin: Negative for pallor and rash. Allergic/Immunologic: Negative for immunocompromised state. Neurological: Negative for dizziness, tremors, seizures, syncope, facial asymmetry, weakness, light-headedness, numbness and headaches. Hematological: Negative for adenopathy.  Does not bruise/bleed easily. Psychiatric/Behavioral: Negative for agitation, hallucinations and suicidal ideas. The patient is not nervous/anxious. PAST MEDICAL HISTORY    has a past medical history of Abscess of leg, Allergic rhinitis, Asthma, Blood circulation, collateral, GERD (gastroesophageal reflux disease), Headache(784.0), Hyperlipidemia, Hypertension, Neuropathy, Osteoarthritis, Other disorders of kidney and ureter, Pneumonia, and Type II or unspecified type diabetes mellitus without mention of complication, not stated as uncontrolled. SURGICAL HISTORY      has a past surgical history that includes Foot surgery (Right, 2008); Hand surgery (Left, 2010); Appendectomy (5/31/12); vascular surgery; Cardiac catheterization (2007); Arm Surgery; and Colonoscopy (Left, 2/26/2018). CURRENT MEDICATIONS       Previous Medications    ALBUTEROL SULFATE HFA (PROVENTIL HFA) 108 (90 BASE) MCG/ACT INHALER    Inhale 2 puffs into the lungs every 6 hours as needed for Wheezing    ASPIRIN 325 MG EC TABLET    Take 1 tablet by mouth daily.     CONTINUOUS BLOOD GLUC  (FREESTYLE YUMI READER) JESU    1 Device by Does not apply route 3 times daily (before meals)    CONTINUOUS BLOOD GLUC SENSOR (FREESTYLE YUMI SENSOR SYSTEM) MISC    1 Device by Does not apply route 3 times daily (before meals)    DICYCLOMINE (BENTYL) 20 MG TABLET    Take 1 tablet by mouth 4 times daily (before meals and nightly)    DULOXETINE (CYMBALTA) 60 MG EXTENDED RELEASE CAPSULE    Take 1 capsule by mouth daily    FLUTICASONE-SALMETEROL (ADVAIR DISKUS) 250-50 MCG/DOSE AEPB    Inhale 1 puff into the lungs every 12 hours    GABAPENTIN (NEURONTIN) 300 MG CAPSULE    Take 1 capsule by mouth daily    GEMFIBROZIL (LOPID) 600 MG TABLET    TAKE 1 TABLET BY MOUTH TWICE DAILY    GLUCOSE MONITORING KIT (FREESTYLE) MONITORING KIT    1 kit by Does not apply route daily as needed    INSULIN GLARGINE (LANTUS) 100 UNIT/ML INJECTION VIAL    Inject 50 Units into the skin nightly    INSULIN LISPRO (HUMALOG) 100 UNIT/ML INJECTION VIAL    Inject 40 Units into the skin 3 times daily (before meals) As needed    LOSARTAN (COZAAR) 50 MG TABLET    Take 1 tablet by mouth 2 times daily    METFORMIN (GLUCOPHAGE) 1000 MG TABLET    TAKE 1 TABLET BY MOUTH TWICE DAILY WITH MEALS    METOPROLOL TARTRATE (LOPRESSOR) 50 MG TABLET    Take 1 tablet by mouth 2 times daily    POLYETHYLENE GLYCOL (GLYCOLAX) POWDER    Colonoscopy Prep Dispense 255 Gram Bottle. Use as Directed    PRAVASTATIN (PRAVACHOL) 40 MG TABLET    Take 1 tablet by mouth daily       ALLERGIES     is allergic to cephalexin, januvia [sitagliptin], and quinapril hcl. FAMILY HISTORY     He indicated that his mother is alive. He indicated that his father is . He indicated that his sister is alive. He indicated that the status of his maternal aunt is unknown. He indicated that the status of his neg hx is unknown.   family history includes Cancer in his maternal aunt; Heart Disease in his father; High Blood Pressure in his mother. SOCIAL HISTORY      reports that he has been smoking cigars. He started smoking about 40 years ago. He has a 30.00 pack-year smoking history. He has never used smokeless tobacco. He reports that he does not drink alcohol and does not use drugs. PHYSICAL EXAM     INITIAL VITALS:  height is 5' 10\" (1.778 m) and weight is 173 lb (78.5 kg). His temperature is 97.7 °F (36.5 °C). His blood pressure is 146/88 (abnormal) and his pulse is 83. His respiration is 9 and oxygen saturation is 93%. Physical Exam  Vitals and nursing note reviewed. Constitutional:       General: He is not in acute distress. Appearance: He is well-developed. He is not diaphoretic. HENT:      Head: Normocephalic and atraumatic. Right Ear: External ear normal.      Left Ear: External ear normal.      Nose: Nose normal.   Eyes:      General: Lids are normal. No scleral icterus.         Right eye: No discharge. Left eye: No discharge. Conjunctiva/sclera: Conjunctivae normal.      Right eye: No exudate. Left eye: No exudate. Pupils: Pupils are equal, round, and reactive to light. Neck:      Thyroid: No thyromegaly. Vascular: No JVD. Trachea: No tracheal deviation. Cardiovascular:      Rate and Rhythm: Normal rate and regular rhythm. Pulses: Normal pulses. Carotid pulses are 2+ on the right side and 2+ on the left side. Radial pulses are 2+ on the right side and 2+ on the left side. Femoral pulses are 2+ on the right side and 2+ on the left side. Popliteal pulses are 2+ on the right side and 2+ on the left side. Dorsalis pedis pulses are 2+ on the right side and 2+ on the left side. Posterior tibial pulses are 2+ on the right side and 2+ on the left side. Heart sounds: Normal heart sounds, S1 normal and S2 normal. No murmur heard. No friction rub. No gallop. Pulmonary:      Effort: Pulmonary effort is normal. No respiratory distress. Breath sounds: Normal breath sounds. No stridor. No decreased breath sounds, wheezing, rhonchi or rales. Chest:      Chest wall: No tenderness. Abdominal:      General: Bowel sounds are normal. There is no distension. Palpations: Abdomen is soft. There is no mass. Tenderness: There is abdominal tenderness in the right upper quadrant and epigastric area. There is no right CVA tenderness, left CVA tenderness, guarding or rebound. Negative signs include Kessler's sign, Rovsing's sign, McBurney's sign, psoas sign and obturator sign. Musculoskeletal:         General: No tenderness. Normal range of motion. Cervical back: Normal range of motion and neck supple. Normal range of motion. Right lower leg: No edema. Left lower leg: No edema. Lymphadenopathy:      Cervical: No cervical adenopathy. Skin:     General: Skin is warm and dry.       Capillary Refill: Capillary refill takes less than 2 seconds. Findings: No bruising, ecchymosis, lesion or rash. Neurological:      Mental Status: He is alert and oriented to person, place, and time. Cranial Nerves: No cranial nerve deficit. Sensory: No sensory deficit. Coordination: Coordination normal.      Deep Tendon Reflexes: Reflexes are normal and symmetric. Psychiatric:         Speech: Speech normal.         Behavior: Behavior normal.         Thought Content: Thought content normal.         Judgment: Judgment normal.           DIFFERENTIAL DIAGNOSIS:   GERD gastritis gastroenteritis cholecystitis cholelithiasis less likely ACS    DIAGNOSTIC RESULTS     EKG: All EKG's are interpreted by the Emergency Department Physician who either signs or Co-signs this chart in the absence of a cardiologist.  EKG showed normal sinus rhythm, slight left axis deviation, ventricular rate of 82 AL interval 184 QRS duration of 88 QT interval 354 QTC of 413. When compared with EKG dated 5/21/2021 no significant changes appreciated. RADIOLOGY: non-plain film images(s) such as CT, Ultrasound and MRI are read by the radiologist.  XR CHEST (2 VW)   Final Result   Impression:      No significant abnormalities.       This document has been electronically signed by: Dara Lopez MD on    09/08/2021 03:07 AM          LABS:   Labs Reviewed   COMPREHENSIVE METABOLIC PANEL - Abnormal; Notable for the following components:       Result Value    Glucose 298 (*)     Chloride 95 (*)     ALT 7 (*)     All other components within normal limits   GLOMERULAR FILTRATION RATE, ESTIMATED - Abnormal; Notable for the following components:    Est, Glom Filt Rate 78 (*)     All other components within normal limits   CBC WITH AUTO DIFFERENTIAL - Abnormal; Notable for the following components:    RBC 4.42 (*)     Hemoglobin 13.1 (*)     Hematocrit 36.6 (*)     MCHC 35.8 (*)     All other components within normal limits   TROPONIN   BRAIN NATRIURETIC PEPTIDE   SPECIMEN REJECTION   ANION GAP   OSMOLALITY   TROPONIN       EMERGENCY DEPARTMENT COURSE:   Vitals:    Vitals:    09/08/21 0346 09/08/21 0446 09/08/21 0453 09/08/21 0501   BP: (!) 172/105 (!) 141/90  (!) 146/88   Pulse: 77 81 83 83   Resp: 12 15 12 9   Temp:       SpO2: 95% 94% 95% 93%   Weight:       Height:         Patient was assessed at bedside appropriate labs and imaging were ordered. Patient was given Zofran Protonix and GI cocktail at bedside. Here today the patient's blood pressure was a little recalcitrant however we did give him hydralazine and it did come down nicely. All the patient's labs were within normal limits as well as his imaging. He had 2 - sets of troponins. He had good improvement of symptoms with his medications. At this point I feel the patient most likely has GERD and gastritis of which he has a long history. Patient has been scheduled for follow-up with [facility] cardiology here on the second floor on 9/9/2021 at 9:30 AM he is instructed to arrive 20 minutes early. I discussed this all extensively at bedside with the patient who understood and agreed with the plan. Patient is subsequently discharged home in stable condition. Patient has what appears to be atypical chest pain. This is most likely related to his GERD and gastritis. Patient has been given refills on these medications he is instructed to take it as prescribed. Patient has been given a follow-up appointment with [facility] cardiology on 9/9/2021 at 9:30 AM he is instructed to keep his appointment here on the second floor. He is instructed to arrive 20 minutes early. Patient is instructed to follow-up with his primary care physician and call for an appointment within the next 1 to 2 days. Patient is instructed to take all current medications as prescribed including his blood pressure medication.   Patient is instructed to return to the nearest emergency room immediately for any new or worsening complaints. CRITICAL CARE:   None    CONSULTS:  None    PROCEDURES:  None    FINAL IMPRESSION      1. Atypical chest pain    2. Gastroesophageal reflux disease without esophagitis    3.  Gastritis and duodenitis          DISPOSITION/PLAN   Discharge    PATIENT REFERRED TO:  [facility] [address] [place] [address]  Go in 1 day      DEV Davis - CNP  [address]  [phone]    Call in 1 day        DISCHARGE MEDICATIONS:  New Prescriptions    CALCIUM CARBONATE (ANTACID) 500 MG CHEWABLE TABLET    Take 1 tablet by mouth daily    ONDANSETRON (ZOFRAN) 4 MG TABLET    Take 1 tablet by mouth 3 times daily as needed for Nausea or Vomiting    PANTOPRAZOLE (PROTONIX) 40 MG TABLET    Take 1 tablet by mouth every morning (before breakfast)       (Please note that portions of this note were completed with a voice recognition program.  Efforts were made to edit the dictations but occasionally words are mis-transcribed.)    Blanche Melendez, [address] [place] [address],   09/08/21 0217

## 2022-01-04 DIAGNOSIS — F32.9 MAJOR DEPRESSIVE DISORDER WITH CURRENT ACTIVE EPISODE, UNSPECIFIED DEPRESSION EPISODE SEVERITY, UNSPECIFIED WHETHER RECURRENT: ICD-10-CM

## 2022-01-04 RX ORDER — DULOXETIN HYDROCHLORIDE 60 MG/1
CAPSULE, DELAYED RELEASE ORAL
Qty: 90 CAPSULE | Refills: 1 | OUTPATIENT
Start: 2022-01-04

## 2022-01-17 NOTE — TELEPHONE ENCOUNTER
Appointment scheduled.   Future Appointments   Date Time Provider Lyndon Karon   1/19/2022 11:20 AM Chan Boland, DEV - CNP [facility] [address]

## 2022-04-11 ENCOUNTER — OFFICE VISIT (OUTPATIENT)
Dept: FAMILY MEDICINE CLINIC | Age: 57
End: 2022-04-11
Payer: COMMERCIAL

## 2022-04-11 VITALS
HEART RATE: 83 BPM | WEIGHT: 166.4 LBS | OXYGEN SATURATION: 98 % | TEMPERATURE: 97.5 F | RESPIRATION RATE: 16 BRPM | BODY MASS INDEX: 23.82 KG/M2 | HEIGHT: 70 IN | SYSTOLIC BLOOD PRESSURE: 135 MMHG | DIASTOLIC BLOOD PRESSURE: 74 MMHG

## 2022-04-11 DIAGNOSIS — K21.9 GASTROESOPHAGEAL REFLUX DISEASE WITHOUT ESOPHAGITIS: ICD-10-CM

## 2022-04-11 DIAGNOSIS — R06.2 WHEEZING: ICD-10-CM

## 2022-04-11 DIAGNOSIS — E11.65 UNCONTROLLED TYPE 2 DIABETES MELLITUS WITH HYPERGLYCEMIA (HCC): ICD-10-CM

## 2022-04-11 DIAGNOSIS — F32.9 MAJOR DEPRESSIVE DISORDER WITH CURRENT ACTIVE EPISODE, UNSPECIFIED DEPRESSION EPISODE SEVERITY, UNSPECIFIED WHETHER RECURRENT: ICD-10-CM

## 2022-04-11 DIAGNOSIS — I10 BENIGN ESSENTIAL HTN: Primary | ICD-10-CM

## 2022-04-11 LAB — HBA1C MFR BLD: 7.6 % (ref 4.3–5.7)

## 2022-04-11 PROCEDURE — 3051F HG A1C>EQUAL 7.0%<8.0%: CPT | Performed by: FAMILY MEDICINE

## 2022-04-11 PROCEDURE — 99214 OFFICE O/P EST MOD 30 MIN: CPT | Performed by: FAMILY MEDICINE

## 2022-04-11 PROCEDURE — 3017F COLORECTAL CA SCREEN DOC REV: CPT | Performed by: FAMILY MEDICINE

## 2022-04-11 PROCEDURE — 4004F PT TOBACCO SCREEN RCVD TLK: CPT | Performed by: FAMILY MEDICINE

## 2022-04-11 PROCEDURE — 2022F DILAT RTA XM EVC RTNOPTHY: CPT | Performed by: FAMILY MEDICINE

## 2022-04-11 PROCEDURE — G8420 CALC BMI NORM PARAMETERS: HCPCS | Performed by: FAMILY MEDICINE

## 2022-04-11 PROCEDURE — G8427 DOCREV CUR MEDS BY ELIG CLIN: HCPCS | Performed by: FAMILY MEDICINE

## 2022-04-11 RX ORDER — DICYCLOMINE HCL 20 MG
20 TABLET ORAL
Qty: 20 TABLET | Refills: 0 | Status: CANCELLED | OUTPATIENT
Start: 2022-04-11

## 2022-04-11 RX ORDER — LORATADINE 10 MG/1
TABLET ORAL
Qty: 90 TABLET | Refills: 0 | Status: SHIPPED | OUTPATIENT
Start: 2022-04-11

## 2022-04-11 RX ORDER — DULOXETIN HYDROCHLORIDE 60 MG/1
60 CAPSULE, DELAYED RELEASE ORAL DAILY
Qty: 90 CAPSULE | Refills: 0 | Status: SHIPPED | OUTPATIENT
Start: 2022-04-11 | End: 2022-06-21 | Stop reason: SDUPTHER

## 2022-04-11 RX ORDER — CYCLOBENZAPRINE HCL 10 MG
TABLET ORAL
COMMUNITY
Start: 2018-03-16

## 2022-04-11 RX ORDER — HYDROCHLOROTHIAZIDE 25 MG/1
TABLET ORAL
COMMUNITY
End: 2022-06-21 | Stop reason: CLARIF

## 2022-04-11 RX ORDER — GABAPENTIN 300 MG/1
300 CAPSULE ORAL DAILY
Qty: 90 CAPSULE | Refills: 0 | Status: SHIPPED | OUTPATIENT
Start: 2022-04-11 | End: 2022-06-21 | Stop reason: SDUPTHER

## 2022-04-11 RX ORDER — PANTOPRAZOLE SODIUM 40 MG/1
40 TABLET, DELAYED RELEASE ORAL
Qty: 90 TABLET | Refills: 0 | Status: SHIPPED | OUTPATIENT
Start: 2022-04-11 | End: 2022-06-21 | Stop reason: SDUPTHER

## 2022-04-11 RX ORDER — LORATADINE 10 MG/1
TABLET ORAL
COMMUNITY
End: 2022-04-11 | Stop reason: SDUPTHER

## 2022-04-11 RX ORDER — METOPROLOL TARTRATE 50 MG/1
50 TABLET, FILM COATED ORAL 2 TIMES DAILY
Qty: 60 TABLET | Refills: 0 | Status: SHIPPED | OUTPATIENT
Start: 2022-04-11 | End: 2022-05-09

## 2022-04-11 RX ORDER — ALBUTEROL SULFATE 90 UG/1
2 AEROSOL, METERED RESPIRATORY (INHALATION) EVERY 6 HOURS PRN
Qty: 1 EACH | Refills: 3 | Status: SHIPPED | OUTPATIENT
Start: 2022-04-11

## 2022-04-11 RX ORDER — INSULIN GLARGINE 100 [IU]/ML
50 INJECTION, SOLUTION SUBCUTANEOUS NIGHTLY
Qty: 4 EACH | Refills: 0 | Status: SHIPPED | OUTPATIENT
Start: 2022-04-11 | End: 2022-09-26 | Stop reason: SDUPTHER

## 2022-04-11 RX ORDER — AMLODIPINE BESYLATE 10 MG/1
TABLET ORAL
COMMUNITY
End: 2022-04-11 | Stop reason: SDUPTHER

## 2022-04-11 RX ORDER — ONDANSETRON 4 MG/1
4 TABLET, FILM COATED ORAL 3 TIMES DAILY PRN
Qty: 15 TABLET | Refills: 0 | Status: CANCELLED | OUTPATIENT
Start: 2022-04-11

## 2022-04-11 RX ORDER — PRAVASTATIN SODIUM 40 MG
40 TABLET ORAL DAILY
Qty: 90 TABLET | Refills: 0 | Status: SHIPPED | OUTPATIENT
Start: 2022-04-11 | End: 2022-06-21 | Stop reason: SDUPTHER

## 2022-04-11 RX ORDER — BLOOD SUGAR DIAGNOSTIC
1 STRIP MISCELLANEOUS DAILY
Qty: 100 STRIP | Refills: 5 | Status: SHIPPED | OUTPATIENT
Start: 2022-04-11

## 2022-04-11 RX ORDER — AMLODIPINE BESYLATE 10 MG/1
TABLET ORAL
Qty: 90 TABLET | Refills: 0 | Status: SHIPPED | OUTPATIENT
Start: 2022-04-11 | End: 2022-06-21 | Stop reason: SDUPTHER

## 2022-04-11 NOTE — PROGRESS NOTES
Shreyas Olguin is a 55 y.o. male that presents for     Chief Complaint   Patient presents with    Medication Refill    Surgical Clearance     eye       /74   Pulse 83   Temp 97.5 °F (36.4 °C) (Infrared)   Resp 16   Ht 5' 10\" (1.778 m)   Wt 166 lb 6.4 oz (75.5 kg)   SpO2 98%   BMI 23.88 kg/m²       HPI    Diabetes Type 2    Patient needs clearance for cataract surgery due to his DM2. Based on refill patterns, it doesn't appear that he has been taking meds as prescribed. Reports that he has not had any of his meds for 6 weeks. Requesting refills on all of his medications today. Glucose control:   Does patient check blood glucoses at home? Not consistently, states that he is unsure if his machine is working  Report of hypoglycemia: no  Lab Results   Component Value Date    LABA1C 7.6 (H) 04/11/2022     No results found for: EAG    Symptoms  Polyuria, Polydipsia or Polyphagia? No  Chest Pain, SOB, or Palpitations? -  No  New Vision complaints? No      Medications  Current medication were reviewed. Compliant with medications? no  Medication side effects? No  On ACE-I or ARB? Yes  On antiplatelet therapy? Yes  On Statin?   Yes      Wt Readings from Last 3 Encounters:   04/11/22 166 lb 6.4 oz (75.5 kg)   09/08/21 173 lb (78.5 kg)   07/14/21 182 lb (82.6 kg)       Blood pressure control:  BP Readings from Last 3 Encounters:   04/11/22 135/74   09/08/21 135/74   07/14/21 124/88       Lab Results   Component Value Date    LABMICR 1.60 07/24/2012       Lab Results   Component Value Date    LDLCALC SEE BELOW 07/10/2017           Health Maintenance   Topic Date Due    Hepatitis C screen  Never done    COVID-19 Vaccine (1) Never done    Pneumococcal 0-64 years Vaccine (1 of 2 - PPSV23) Never done    HIV screen  Never done    Hepatitis B vaccine (1 of 3 - Risk 3-dose series) Never done    DTaP/Tdap/Td vaccine (1 - Tdap) Never done    Diabetic retinal exam  10/23/2014    Diabetic microalbuminuria test  2015    Shingles Vaccine (1 of 2) Never done    Low dose CT lung screening  2018    Lipid screen  07/10/2018    Diabetic foot exam  2019    A1C test (Diabetic or Prediabetic)  2022    Depression Monitoring  2022    Flu vaccine (Season Ended) 2022    Potassium monitoring  2022    Creatinine monitoring  2022    Colorectal Cancer Screen  2028    Hepatitis A vaccine  Aged Out    Hib vaccine  Aged Out    Meningococcal (ACWY) vaccine  Aged Out       Past Medical History:   Diagnosis Date    Abscess of leg     Allergic rhinitis     Asthma     Blood circulation, collateral     numbness in hands and feet    GERD (gastroesophageal reflux disease)     Headache(784.0)     Hyperlipidemia     Hypertension     Neuropathy     Osteoarthritis     Other disorders of kidney and ureter     Pneumonia     Type II or unspecified type diabetes mellitus without mention of complication, not stated as uncontrolled     type 2       Past Surgical History:   Procedure Laterality Date    APPENDECTOMY  12    bree Peguero    ARM SURGERY      CARDIAC CATHETERIZATION      WNL, no stents     COLONOSCOPY Left 2018    COLONOSCOPY POLYPECTOMY HOT BIOPSY performed by Esteban Adkins [facility] at [facility] Right 2008    Rt leg    HAND SURGERY Left 2010    VASCULAR SURGERY      pinched nerve left arm       Social History     Tobacco Use    Smoking status: Current Some Day Smoker     Packs/day: 1.00     Years: 30.00     Pack years: 30.00     Types: Cigars     Start date: 10/18/1980     Last attempt to quit: 10/18/2004     Years since quittin.4    Smokeless tobacco: Never Used    Tobacco comment: 5 cigars per day   Substance Use Topics    Alcohol use: No     Comment: occassionally    Drug use: No       Family History   Problem Relation Age of Onset    High Blood Pressure Mother     Heart Disease Father  Cancer Maternal Aunt         LIVER    Colon Polyps Neg Hx          I have reviewed the patient's past medical history, past surgical history, allergies, medications, social and family history and I have made updates where appropriate. Review of Systems        PHYSICAL EXAM:  /74   Pulse 83   Temp 97.5 °F (36.4 °C) (Infrared)   Resp 16   Ht 5' 10\" (1.778 m)   Wt 166 lb 6.4 oz (75.5 kg)   SpO2 98%   BMI 23.88 kg/m²     Physical Exam        ASSESSMENT & PLAN  Karen Garnica was seen today for medication refill and surgical clearance. Diagnoses and all orders for this visit:    Benign essential HTN  -     metoprolol tartrate (LOPRESSOR) 50 MG tablet; Take 1 tablet by mouth 2 times daily  -     amLODIPine (NORVASC) 10 MG tablet; amLODIPine amlodipine 10 mg oral tablet take 1 tablet (10 mg) by oral route once daily   [facility] ([id number])    DM (diabetes mellitus), type 2, uncontrolled  -     insulin glargine (LANTUS) 100 UNIT/ML injection vial; Inject 50 Units into the skin nightly  -     insulin lispro (HUMALOG) 100 UNIT/ML injection vial; Inject 40 Units into the skin 3 times daily (before meals) As needed  -     metFORMIN (GLUCOPHAGE) 1000 MG tablet; TAKE 1 TABLET BY MOUTH TWICE DAILY WITH MEALS  -     gabapentin (NEURONTIN) 300 MG capsule; Take 1 capsule by mouth daily for 90 days. -     pravastatin (PRAVACHOL) 40 MG tablet; Take 1 tablet by mouth daily  -     aspirin 325 MG EC tablet; Take 1 tablet by mouth daily  -     blood glucose test strips (GLUCOSE METER TEST) strip; 1 each by In Vitro route daily Check blood sugar q daily Dx E11.9  -     blood glucose monitor kit and supplies; Check blood sugar q daily, Dx E11.9  -     POCT glycosylated hemoglobin (Hb A1C)    Major depressive disorder with current active episode, unspecified depression episode severity, unspecified whether recurrent  -     DULoxetine (CYMBALTA) 60 MG extended release capsule;  Take 1 capsule by mouth daily    Gastroesophageal reflux disease without esophagitis  -     pantoprazole (PROTONIX) 40 MG tablet; Take 1 tablet by mouth every morning (before breakfast)    Wheezing  -     albuterol sulfate HFA (PROVENTIL HFA) 108 (90 Base) MCG/ACT inhaler; Inhale 2 puffs into the lungs every 6 hours as needed for Wheezing  -     loratadine (CLARITIN) 10 MG tablet; loratadine loratadine 10 mg oral tablet take 1 tablet (10 mg) by oral route once daily   [facility] ([id number])        Return in about 3 months (around 7/11/2022). Symptoms consistent with worsening and uncontrolled DM2 due to not having medications. Will restart previous meds today. May proceed with cataract procedure. Recheck in 3 months. The most recent results of the following tests were reviewed with the patient today: A1c     All copied or forwarded information in the progress note was verified by me to be accurate at the time of visit  Patient's past medical, surgical, social and family history were reviewed and updated     All patient questions answered. Patient voiced understanding.

## 2022-05-08 DIAGNOSIS — I10 BENIGN ESSENTIAL HTN: ICD-10-CM

## 2022-05-09 RX ORDER — METOPROLOL TARTRATE 50 MG/1
TABLET, FILM COATED ORAL
Qty: 180 TABLET | Refills: 3 | Status: SHIPPED | OUTPATIENT
Start: 2022-05-09

## 2022-06-21 ENCOUNTER — OFFICE VISIT (OUTPATIENT)
Dept: FAMILY MEDICINE CLINIC | Age: 57
End: 2022-06-21
Payer: COMMERCIAL

## 2022-06-21 VITALS
TEMPERATURE: 97.8 F | DIASTOLIC BLOOD PRESSURE: 60 MMHG | HEART RATE: 74 BPM | HEIGHT: 70 IN | SYSTOLIC BLOOD PRESSURE: 98 MMHG | WEIGHT: 157.7 LBS | BODY MASS INDEX: 22.58 KG/M2 | OXYGEN SATURATION: 98 %

## 2022-06-21 DIAGNOSIS — E11.65 UNCONTROLLED TYPE 2 DIABETES MELLITUS WITH HYPERGLYCEMIA, WITH LONG-TERM CURRENT USE OF INSULIN (HCC): ICD-10-CM

## 2022-06-21 DIAGNOSIS — Z79.4 UNCONTROLLED TYPE 2 DIABETES MELLITUS WITH HYPERGLYCEMIA, WITH LONG-TERM CURRENT USE OF INSULIN (HCC): ICD-10-CM

## 2022-06-21 DIAGNOSIS — I10 BENIGN ESSENTIAL HTN: ICD-10-CM

## 2022-06-21 DIAGNOSIS — E11.65 UNCONTROLLED TYPE 2 DIABETES MELLITUS WITH HYPERGLYCEMIA (HCC): ICD-10-CM

## 2022-06-21 DIAGNOSIS — K21.9 GASTROESOPHAGEAL REFLUX DISEASE WITHOUT ESOPHAGITIS: ICD-10-CM

## 2022-06-21 DIAGNOSIS — F32.9 MAJOR DEPRESSIVE DISORDER WITH CURRENT ACTIVE EPISODE, UNSPECIFIED DEPRESSION EPISODE SEVERITY, UNSPECIFIED WHETHER RECURRENT: ICD-10-CM

## 2022-06-21 LAB
HBA1C MFR BLD: 6.2 %
HBA1C MFR BLD: 6.2 % (ref 4.3–5.7)

## 2022-06-21 PROCEDURE — 3044F HG A1C LEVEL LT 7.0%: CPT | Performed by: NURSE PRACTITIONER

## 2022-06-21 PROCEDURE — 99214 OFFICE O/P EST MOD 30 MIN: CPT | Performed by: NURSE PRACTITIONER

## 2022-06-21 PROCEDURE — G8420 CALC BMI NORM PARAMETERS: HCPCS | Performed by: NURSE PRACTITIONER

## 2022-06-21 PROCEDURE — 2022F DILAT RTA XM EVC RTNOPTHY: CPT | Performed by: NURSE PRACTITIONER

## 2022-06-21 PROCEDURE — 4004F PT TOBACCO SCREEN RCVD TLK: CPT | Performed by: NURSE PRACTITIONER

## 2022-06-21 PROCEDURE — G8427 DOCREV CUR MEDS BY ELIG CLIN: HCPCS | Performed by: NURSE PRACTITIONER

## 2022-06-21 PROCEDURE — 3017F COLORECTAL CA SCREEN DOC REV: CPT | Performed by: NURSE PRACTITIONER

## 2022-06-21 RX ORDER — DULOXETIN HYDROCHLORIDE 60 MG/1
60 CAPSULE, DELAYED RELEASE ORAL DAILY
Qty: 90 CAPSULE | Refills: 0 | Status: SHIPPED | OUTPATIENT
Start: 2022-06-21

## 2022-06-21 RX ORDER — PANTOPRAZOLE SODIUM 40 MG/1
40 TABLET, DELAYED RELEASE ORAL
Qty: 90 TABLET | Refills: 0 | Status: SHIPPED | OUTPATIENT
Start: 2022-06-21 | End: 2022-09-26 | Stop reason: SDUPTHER

## 2022-06-21 RX ORDER — FLASH GLUCOSE SENSOR
1 KIT MISCELLANEOUS
Qty: 1 EACH | Refills: 0 | Status: SHIPPED | OUTPATIENT
Start: 2022-06-21 | End: 2022-10-06 | Stop reason: SDUPTHER

## 2022-06-21 RX ORDER — LISINOPRIL AND HYDROCHLOROTHIAZIDE 25; 20 MG/1; MG/1
1 TABLET ORAL DAILY
Qty: 90 TABLET | Refills: 1 | Status: SHIPPED | OUTPATIENT
Start: 2022-06-21

## 2022-06-21 RX ORDER — GABAPENTIN 300 MG/1
300 CAPSULE ORAL DAILY
Qty: 90 CAPSULE | Refills: 0 | Status: SHIPPED | OUTPATIENT
Start: 2022-06-21 | End: 2022-09-19

## 2022-06-21 RX ORDER — PRAVASTATIN SODIUM 40 MG
40 TABLET ORAL DAILY
Qty: 90 TABLET | Refills: 0 | Status: SHIPPED | OUTPATIENT
Start: 2022-06-21

## 2022-06-21 RX ORDER — AMLODIPINE BESYLATE 10 MG/1
TABLET ORAL
Qty: 90 TABLET | Refills: 0 | Status: SHIPPED | OUTPATIENT
Start: 2022-06-21 | End: 2022-10-18

## 2022-06-21 RX ORDER — FLASH GLUCOSE SENSOR
1 KIT MISCELLANEOUS
Qty: 1 EACH | Refills: 5 | Status: SHIPPED | OUTPATIENT
Start: 2022-06-21

## 2022-06-21 NOTE — LETTER
[address] [place]  [address]  [address] [place] [address] [address] [address]  Phone: [phone]  Fax: [address]ovey, APRN - CNP        June 21, 2022     Patient: Fermin Ferreira   YOB: 1965   Date of Visit: 6/21/2022       To Whom It May Concern:    Zeenat Elder, spouse of Cathryn Joy, needs to be off of work for the care of her  6-21, 6-22, 6-. If you have any questions or concerns, please don't hesitate to call.     Sincerely,        DEV Knapp CNP

## 2022-06-21 NOTE — PROGRESS NOTES
Patient needs refill on gabapentin and he will continue to do his health care with [facility]

## 2022-06-21 NOTE — PROGRESS NOTES
Fermin Ferreira is a 55 y.o. male thatpresents for No chief complaint on file. History obtained today from Patient and Wife. HPI    Diabetes Type 2    Glucose control:   Does patient check blood glucoses at home? Yes - TID, 150-190, sometimes over 200     Report of hypoglycemia: yes, gets sweaty, mostly in the hospital, mostly in middle of night. Lab Results   Component Value Date    LABA1C 6.2 06/21/2022     No results found for: EAG    Symptoms  Polyuria, Polydipsia or Polyphagia? Yes, polyuria  Chest Pain, SOB, or Palpitations? -  No  New Vision complaints? No  Paresthesias of the extremities? Yes - a little, Neurontin helps    Medications  Current medication were reviewed. Compliant with medications? yes  Medication side effects? No  On ACE-I or ARB? Yes  On antiplatelet therapy? Yes  On Statin? Yes    Last Diabetic Eye Exam: recently had cataract surgery    Exercise  Exercise? PT at home. Wt Readings from Last 3 Encounters:   06/21/22 157 lb 11.2 oz (71.5 kg)   04/11/22 166 lb 6.4 oz (75.5 kg)   09/08/21 173 lb (78.5 kg)       Diet discipline?:  Low salt, fat, sugar diet?  no    Blood pressure control:  BP Readings from Last 3 Encounters:   06/21/22 98/60   04/11/22 135/74   09/08/21 135/74       Lab Results   Component Value Date    LABMICR 1.60 07/24/2012       Lab Results   Component Value Date    LDLCALC SEE BELOW 07/10/2017         I have reviewed the patient's past medical history, past surgical history, allergies,medications, social and family history and I have made updates where appropriate.     Past Medical History:   Diagnosis Date    Abscess of leg     Allergic rhinitis     Asthma     Blood circulation, collateral     numbness in hands and feet    GERD (gastroesophageal reflux disease)     Headache(784.0)     Hyperlipidemia     Hypertension     Neuropathy     Osteoarthritis     Other disorders of kidney and ureter     Pneumonia     Type II or unspecified type diabetes mellitus without mention of complication, not stated as uncontrolled     type 2       Social History     Tobacco Use    Smoking status: Current Some Day Smoker     Packs/day: 1.00     Years: 30.00     Pack years: 30.00     Types: Cigars     Start date: 10/18/1980     Last attempt to quit: 10/18/2004     Years since quittin.6    Smokeless tobacco: Never Used    Tobacco comment: 5 cigars per day   Substance Use Topics    Alcohol use: No     Comment: occassionally    Drug use: No       Family History   Problem Relation Age of Onset    High Blood Pressure Mother     Heart Disease Father     Cancer Maternal Aunt         LIVER    Colon Polyps Neg Hx          Review of Systems        PHYSICAL EXAM:  BP 98/60 (Site: Left Upper Arm, Position: Sitting)   Pulse 74   Temp 97.8 °F (36.6 °C)   Ht 5' 10\" (1.778 m)   Wt 157 lb 11.2 oz (71.5 kg)   SpO2 98%   BMI 22.63 kg/m²     Physical Exam      ASSESSMENT & PLAN  Diagnoses and all orders for this visit:    Uncontrolled type 2 diabetes mellitus with hyperglycemia (HCC)  -     gabapentin (NEURONTIN) 300 MG capsule; Take 1 capsule by mouth daily for 90 days. -     metFORMIN (GLUCOPHAGE) 1000 MG tablet; TAKE 1 TABLET BY MOUTH TWICE DAILY WITH MEALS  -     pravastatin (PRAVACHOL) 40 MG tablet; Take 1 tablet by mouth daily    Uncontrolled type 2 diabetes mellitus with hyperglycemia, with long-term current use of insulin (HCC)  -     Continuous Blood Gluc  (FREESTYLE YUMI READER) JESU; 1 Device by Does not apply route 3 times daily (before meals)  -     Continuous Blood Gluc Sensor ([address] [place] [address]) MISC; 1 Device by Does not apply route 3 times daily (before meals)    Major depressive disorder with current active episode, unspecified depression episode severity, unspecified whether recurrent  -     DULoxetine (CYMBALTA) 60 MG extended release capsule;  Take 1 capsule by mouth daily    Gastroesophageal reflux disease without esophagitis  -     pantoprazole (PROTONIX) 40 MG tablet; Take 1 tablet by mouth every morning (before breakfast)    Benign essential HTN  -     amLODIPine (NORVASC) 10 MG tablet; amLODIPine amlodipine 10 mg oral tablet take 1 tablet (10 mg) by oral route once daily   [facility] ([id number])    Other orders  -     lisinopril-hydroCHLOROthiazide (PRINZIDE;ZESTORETIC) 20-25 MG per tablet; Take 1 tablet by mouth daily        No follow-ups on file. No red flag sx, Start above treatments, Follow up with our office in 3 months and Follow up with our practice if no improvement or worsening sx. All copied or forwarded information in the progress note was verified by me to be accurate at the time of visit  Patient's past medical, surgical, social and family history were reviewed and updated     All patient questions answered. Patient voiced understanding.

## 2022-06-23 ENCOUNTER — TELEPHONE (OUTPATIENT)
Dept: FAMILY MEDICINE CLINIC | Age: 57
End: 2022-06-23

## 2022-06-23 NOTE — TELEPHONE ENCOUNTER
Sent in [id number] on 6/22/22. Received a denial letter today.  I will send in office notes for appeal.

## 2022-09-26 ENCOUNTER — OFFICE VISIT (OUTPATIENT)
Dept: FAMILY MEDICINE CLINIC | Age: 57
End: 2022-09-26
Payer: COMMERCIAL

## 2022-09-26 VITALS
BODY MASS INDEX: 23.19 KG/M2 | OXYGEN SATURATION: 93 % | SYSTOLIC BLOOD PRESSURE: 136 MMHG | WEIGHT: 162 LBS | HEIGHT: 70 IN | TEMPERATURE: 98.6 F | HEART RATE: 74 BPM | DIASTOLIC BLOOD PRESSURE: 72 MMHG | RESPIRATION RATE: 14 BRPM

## 2022-09-26 DIAGNOSIS — E11.65 UNCONTROLLED TYPE 2 DIABETES MELLITUS WITH HYPERGLYCEMIA (HCC): ICD-10-CM

## 2022-09-26 DIAGNOSIS — K21.9 GASTROESOPHAGEAL REFLUX DISEASE WITHOUT ESOPHAGITIS: ICD-10-CM

## 2022-09-26 DIAGNOSIS — S88.111A AMPUTATION OF RIGHT LOWER EXTREMITY BELOW KNEE (HCC): Primary | ICD-10-CM

## 2022-09-26 PROCEDURE — G8420 CALC BMI NORM PARAMETERS: HCPCS | Performed by: NURSE PRACTITIONER

## 2022-09-26 PROCEDURE — 3017F COLORECTAL CA SCREEN DOC REV: CPT | Performed by: NURSE PRACTITIONER

## 2022-09-26 PROCEDURE — 2022F DILAT RTA XM EVC RTNOPTHY: CPT | Performed by: NURSE PRACTITIONER

## 2022-09-26 PROCEDURE — 4004F PT TOBACCO SCREEN RCVD TLK: CPT | Performed by: NURSE PRACTITIONER

## 2022-09-26 PROCEDURE — 99214 OFFICE O/P EST MOD 30 MIN: CPT | Performed by: NURSE PRACTITIONER

## 2022-09-26 PROCEDURE — G8427 DOCREV CUR MEDS BY ELIG CLIN: HCPCS | Performed by: NURSE PRACTITIONER

## 2022-09-26 PROCEDURE — 3051F HG A1C>EQUAL 7.0%<8.0%: CPT | Performed by: NURSE PRACTITIONER

## 2022-09-26 RX ORDER — PANTOPRAZOLE SODIUM 40 MG/1
40 TABLET, DELAYED RELEASE ORAL
Qty: 90 TABLET | Refills: 0 | Status: CANCELLED | OUTPATIENT
Start: 2022-09-26

## 2022-09-26 RX ORDER — PANTOPRAZOLE SODIUM 40 MG/1
40 TABLET, DELAYED RELEASE ORAL
Qty: 90 TABLET | Refills: 0 | Status: SHIPPED | OUTPATIENT
Start: 2022-09-26

## 2022-09-26 RX ORDER — INSULIN GLARGINE 100 [IU]/ML
50 INJECTION, SOLUTION SUBCUTANEOUS NIGHTLY
Qty: 4 EACH | Refills: 0 | Status: SHIPPED | OUTPATIENT
Start: 2022-09-26

## 2022-09-26 RX ORDER — WHEELCHAIR
2 EACH MISCELLANEOUS DAILY
Qty: 2 EACH | Refills: 0 | Status: SHIPPED | OUTPATIENT
Start: 2022-09-26

## 2022-09-26 RX ORDER — INSULIN GLARGINE 100 [IU]/ML
50 INJECTION, SOLUTION SUBCUTANEOUS NIGHTLY
Qty: 4 EACH | Refills: 0 | Status: CANCELLED | OUTPATIENT
Start: 2022-09-26

## 2022-09-26 NOTE — PROGRESS NOTES
Transition of Care Visit/Hospital Follow Up:      Esteban Norwood is a 55 y.o. male that presents for Diabetes (Pt is here for follow up, pt states no complaints at this time. Pt would like a handicap placcard. )        Date of Discharge:   9-  Was patient contacted within 2 business days of discharge (see chart for documentation):  no      Patient presents for hospital follow up. Patient recently hospitalized at [facility] for treatment of right foot infection. Symptoms prior to admission:  was sent from Dr Vinayak Kang office for abscess of right foot infection. 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 Discharged from rehab on 9-. Medication changes at discharge:  Med list reconciled today    Clinical course since discharge:      Having home health and PT/OT come to house. Doing dressing changes as well. No fevers or chills. Appetite is good  Blood sugars are improved, am   Needs anti tip device for wheelchair, he fell back in wheelchair the other day at home. Did not hit his head. Had some back pain. Oxycodone controlling pain  Pain comes and goes. Having phantom pain, seeing phys med/rehab physician Dr Billy Del Valle, has appt this week. No issues with Diarrhea  Had to receive blood transfusion    No chest pain or shortness of breath        Current Outpatient Medications   Medication Sig Dispense Refill    Misc. Devices [facility]) MISC 2 each by Does not apply route daily Anti tip device for wheelchair.  2 each 0    pantoprazole (PROTONIX) 40 MG tablet Take 1 tablet by mouth every morning (before breakfast) 90 tablet 0    insulin glargine (LANTUS) 100 UNIT/ML injection vial Inject 50 Units into the skin nightly 4 each 0    insulin lispro (HUMALOG) 100 UNIT/ML injection vial ACHS Sliding scale Blood sugar 0-199  0 units                 200- 249-  2 units                 250-299    4 units 300-349    6 units                 350-399    8 units 4 each 0    Continuous Blood Gluc  (FREESTYLE YUMI READER) JESU 1 Device by Does not apply route 3 times daily (before meals) 1 each 5    Continuous Blood Gluc Sensor (FREESTYLE YUMI SENSOR SYSTEM) MISC 1 Device by Does not apply route 3 times daily (before meals) 1 each 0    metFORMIN (GLUCOPHAGE) 1000 MG tablet TAKE 1 TABLET BY MOUTH TWICE DAILY WITH MEALS 180 tablet 0    DULoxetine (CYMBALTA) 60 MG extended release capsule Take 1 capsule by mouth daily 90 capsule 0    pravastatin (PRAVACHOL) 40 MG tablet Take 1 tablet by mouth daily 90 tablet 0    amLODIPine (NORVASC) 10 MG tablet amLODIPine amlodipine 10 mg oral tablet take 1 tablet (10 mg) by oral route once daily   [facility] 87 ([id number]) 90 tablet 0    lisinopril-hydroCHLOROthiazide (PRINZIDE;ZESTORETIC) 20-25 MG per tablet Take 1 tablet by mouth daily 90 tablet 1    metoprolol tartrate (LOPRESSOR) 50 MG tablet take 1 tablet by mouth twice a day 180 tablet 3    cyclobenzaprine (FLEXERIL) 10 MG tablet cyclobenzaprine cyclobenzaprine 10 mg oral tablet 3/16/2018 take 1 tablet (10 mg) by oral route 3 times per day 03-  [facility] 87 ([id number])      aspirin 325 MG EC tablet Take 1 tablet by mouth daily 90 tablet 0    albuterol sulfate HFA (PROVENTIL HFA) 108 (90 Base) MCG/ACT inhaler Inhale 2 puffs into the lungs every 6 hours as needed for Wheezing 1 each 3    loratadine (CLARITIN) 10 MG tablet loratadine loratadine 10 mg oral tablet take 1 tablet (10 mg) by oral route once daily   [facility] 87 ([id number]) 90 tablet 0    blood glucose test strips (GLUCOSE METER TEST) strip 1 each by In Vitro route daily Check blood sugar q daily Dx E11.9 100 strip 5    blood glucose monitor kit and supplies Check blood sugar q daily, Dx E11.9 1 kit 0    ondansetron (ZOFRAN) 4 MG tablet Take 1 tablet by mouth 3 times daily as needed for Nausea or Vomiting 15 tablet 0    dicyclomine (BENTYL) 20 MG tablet Take 1 tablet by mouth 4 times daily (before meals and nightly) 20 tablet 0    gemfibrozil (LOPID) 600 MG tablet TAKE 1 TABLET BY MOUTH TWICE DAILY 180 tablet 1    glucose monitoring kit (FREESTYLE) monitoring kit 1 kit by Does not apply route daily as needed 1 kit 0    fluticasone-salmeterol (ADVAIR DISKUS) 250-50 MCG/DOSE AEPB Inhale 1 puff into the lungs every 12 hours 3 each 1    gabapentin (NEURONTIN) 300 MG capsule Take 1 capsule by mouth daily for 90 days. 90 capsule 0     No current facility-administered medications for this visit.        Past Medical History:   Diagnosis Date    Abscess of leg     Allergic rhinitis     Asthma     Blood circulation, collateral     numbness in hands and feet    GERD (gastroesophageal reflux disease)     Headache(784.0)     Hyperlipidemia     Hypertension     Neuropathy     Osteoarthritis     Other disorders of kidney and ureter     Pneumonia     Type II or unspecified type diabetes mellitus without mention of complication, not stated as uncontrolled     type 2       Past Surgical History:   Procedure Laterality Date    APPENDECTOMY  12    lap Jb Maria    ARM SURGERY      CARDIAC CATHETERIZATION      WNL, no stents     COLONOSCOPY Left 2018    COLONOSCOPY POLYPECTOMY HOT BIOPSY performed by Ok Adam MD at [facility]    FOOT SURGERY Right     Rt leg    HAND SURGERY Left     VASCULAR SURGERY      pinched nerve left arm       Social History     Tobacco Use    Smoking status: Some Days     Packs/day: 1.00     Years: 30.00     Pack years: 30.00     Types: Cigars, Cigarettes     Start date: 10/18/1980     Last attempt to quit: 10/18/2004     Years since quittin.9    Smokeless tobacco: Never    Tobacco comments:     5 cigars per day   Substance Use Topics    Alcohol use: No     Comment: occassionally    Drug use: No       Family History   Problem Relation Age of Onset    High Blood Pressure Mother     Heart Disease Father     Cancer Maternal Aunt         LIVER    Colon Polyps Neg Hx          I have reviewed the patient's past medical history, past surgical history, allergies, medications, social and family history and I have made updates where appropriate. PHYSICAL EXAM:  /72   Pulse 74   Temp 98.6 °F (37 °C) (Temporal)   Resp 14   Ht 5' 10\" (1.778 m)   Wt 162 lb (73.5 kg)   SpO2 93%   BMI 23.24 kg/m²   General Appearance: well developed and well- nourished, in no acute distress  Head: normocephalic and atraumatic  ENT: external ear and ear canal normal bilaterally, nose without deformity, nasal mucosa and turbinates normal without polyps, oropharynx normal, dentition is normal for age, no lip or gum lesions noted  Neck: supple and non-tender without mass, no thyromegaly or thyroid nodules, no cervical lymphadenopathy  Pulmonary/Chest: clear to auscultation bilaterally- no wheezes, rales or rhonchi, normal air movement, no respiratory distress or retractions  Cardiovascular: normal rate, regular rhythm, normal S1 and S2, no murmurs, rubs, clicks, or gallops, distal pulses intact  Abdomen: soft, non-tender, non-distended, no rebound or guarding, no masses or hernias noted, no hepatospleenomegaly  Extremities: right BKA, dressing dry and intact. Psych:  Normal affect without evidence of depression or anxiety, insight and judgement are appropriate, memory appears intact  Skin: warm and dry, no rash or erythema      Diagnostic test results reviewed: inpatient labs, culture/micro-blood cultures, tissue cultures, and CT-chest    Patient risk of morbidity and mortality: moderate      ASSESSMENT & PLAN  Leslie Bacon was seen today for diabetes. Diagnoses and all orders for this visit:    Amputation of right lower extremity below knee ([address]  -     Misc.  Devices [facility]) MISC; 2 each by Does not apply route daily Anti tip device for wheelchair.  -     Cancel: Handicap placard  -     Handicap placard    Gastroesophageal reflux disease without esophagitis  -     pantoprazole (PROTONIX) 40 MG tablet; Take 1 tablet by mouth every morning (before breakfast)    Uncontrolled type 2 diabetes mellitus with hyperglycemia (HCC)  -     Discontinue: insulin lispro (HUMALOG) 100 UNIT/ML injection vial; ACHS  Sliding scale     Blood sugar 0-199  0 units                  200- 249-  2 units                  250-299    4 units                  300-349    6 units                  350-399    8 units  -     insulin glargine (LANTUS) 100 UNIT/ML injection vial; Inject 50 Units into the skin nightly  -     insulin lispro (HUMALOG) 100 UNIT/ML injection vial; ACHS Sliding scale Blood sugar 0-199  0 units                 200- 249-  2 units                 250-299    4 units                 300-349    6 units                 350-399    8 units      Return in about 3 months (around 12/26/2022). Level of medical complexity:  moderate    -Overall, patient is improving  -Continue current meds  -Advised to continue to closely monitor her symptoms and if any worsening to seek treatment    All copied or forwarded information in the progress note was verified by me to be accurate at the time of visit  Patient's past medical, surgical, social and family history were reviewed and updated      All patient questions answered. Patient voiced understanding.

## 2022-10-05 ENCOUNTER — TELEPHONE (OUTPATIENT)
Dept: FAMILY MEDICINE CLINIC | Age: 57
End: 2022-10-05

## 2022-10-06 ENCOUNTER — OFFICE VISIT (OUTPATIENT)
Dept: FAMILY MEDICINE CLINIC | Age: 57
End: 2022-10-06
Payer: COMMERCIAL

## 2022-10-06 VITALS
HEIGHT: 70 IN | OXYGEN SATURATION: 97 % | BODY MASS INDEX: 23.19 KG/M2 | HEART RATE: 87 BPM | WEIGHT: 162 LBS | DIASTOLIC BLOOD PRESSURE: 78 MMHG | TEMPERATURE: 96.9 F | SYSTOLIC BLOOD PRESSURE: 132 MMHG | RESPIRATION RATE: 16 BRPM

## 2022-10-06 DIAGNOSIS — R07.81 RIB PAIN ON RIGHT SIDE: Primary | ICD-10-CM

## 2022-10-06 DIAGNOSIS — E11.65 UNCONTROLLED TYPE 2 DIABETES MELLITUS WITH HYPERGLYCEMIA, WITH LONG-TERM CURRENT USE OF INSULIN (HCC): ICD-10-CM

## 2022-10-06 DIAGNOSIS — Z79.4 UNCONTROLLED TYPE 2 DIABETES MELLITUS WITH HYPERGLYCEMIA, WITH LONG-TERM CURRENT USE OF INSULIN (HCC): ICD-10-CM

## 2022-10-06 PROCEDURE — 2022F DILAT RTA XM EVC RTNOPTHY: CPT | Performed by: NURSE PRACTITIONER

## 2022-10-06 PROCEDURE — 3051F HG A1C>EQUAL 7.0%<8.0%: CPT | Performed by: NURSE PRACTITIONER

## 2022-10-06 PROCEDURE — G8420 CALC BMI NORM PARAMETERS: HCPCS | Performed by: NURSE PRACTITIONER

## 2022-10-06 PROCEDURE — 99214 OFFICE O/P EST MOD 30 MIN: CPT | Performed by: NURSE PRACTITIONER

## 2022-10-06 PROCEDURE — G8427 DOCREV CUR MEDS BY ELIG CLIN: HCPCS | Performed by: NURSE PRACTITIONER

## 2022-10-06 PROCEDURE — 4004F PT TOBACCO SCREEN RCVD TLK: CPT | Performed by: NURSE PRACTITIONER

## 2022-10-06 PROCEDURE — 3017F COLORECTAL CA SCREEN DOC REV: CPT | Performed by: NURSE PRACTITIONER

## 2022-10-06 PROCEDURE — G8484 FLU IMMUNIZE NO ADMIN: HCPCS | Performed by: NURSE PRACTITIONER

## 2022-10-06 RX ORDER — LIDOCAINE 50 MG/G
1 PATCH TOPICAL DAILY
Qty: 30 PATCH | Refills: 0 | Status: SHIPPED | OUTPATIENT
Start: 2022-10-06

## 2022-10-06 RX ORDER — FLASH GLUCOSE SENSOR
KIT MISCELLANEOUS
Qty: 6 EACH | Refills: 3 | Status: SHIPPED | OUTPATIENT
Start: 2022-10-06 | End: 2022-10-17 | Stop reason: SDUPTHER

## 2022-10-06 ASSESSMENT — PATIENT HEALTH QUESTIONNAIRE - PHQ9
SUM OF ALL RESPONSES TO PHQ QUESTIONS 1-9: 1
SUM OF ALL RESPONSES TO PHQ9 QUESTIONS 1 & 2: 0
3. TROUBLE FALLING OR STAYING ASLEEP: 0
1. LITTLE INTEREST OR PLEASURE IN DOING THINGS: 0
SUM OF ALL RESPONSES TO PHQ QUESTIONS 1-9: 1
10. IF YOU CHECKED OFF ANY PROBLEMS, HOW DIFFICULT HAVE THESE PROBLEMS MADE IT FOR YOU TO DO YOUR WORK, TAKE CARE OF THINGS AT HOME, OR GET ALONG WITH OTHER PEOPLE: 0
SUM OF ALL RESPONSES TO PHQ QUESTIONS 1-9: 1
SUM OF ALL RESPONSES TO PHQ QUESTIONS 1-9: 1
9. THOUGHTS THAT YOU WOULD BE BETTER OFF DEAD, OR OF HURTING YOURSELF: 0
2. FEELING DOWN, DEPRESSED OR HOPELESS: 0
5. POOR APPETITE OR OVEREATING: 0
8. MOVING OR SPEAKING SO SLOWLY THAT OTHER PEOPLE COULD HAVE NOTICED. OR THE OPPOSITE, BEING SO FIGETY OR RESTLESS THAT YOU HAVE BEEN MOVING AROUND A LOT MORE THAN USUAL: 0
6. FEELING BAD ABOUT YOURSELF - OR THAT YOU ARE A FAILURE OR HAVE LET YOURSELF OR YOUR FAMILY DOWN: 0
4. FEELING TIRED OR HAVING LITTLE ENERGY: 0
7. TROUBLE CONCENTRATING ON THINGS, SUCH AS READING THE NEWSPAPER OR WATCHING TELEVISION: 1

## 2022-10-06 NOTE — PROGRESS NOTES
Kim Mancuso is a 55 y.o. male thatpresents for Other Velma Brooms  and is having issues)      History obtained today from Patient. Dianne Sánchez this past   Hit right side of chest on toilet  No loc  Didn't hit head  Denies sob, chest pain  Notes right rib pain with cough and sneezing    I have reviewed the patient's past medical history, past surgical history, allergies,medications, social and family history and I have made updates where appropriate. Past Medical History:   Diagnosis Date    Abscess of leg     Allergic rhinitis     Asthma     Blood circulation, collateral     numbness in hands and feet    GERD (gastroesophageal reflux disease)     Headache(784.0)     Hyperlipidemia     Hypertension     Neuropathy     Osteoarthritis     Other disorders of kidney and ureter     Pneumonia     Type II or unspecified type diabetes mellitus without mention of complication, not stated as uncontrolled     type 2       Social History     Tobacco Use    Smoking status: Some Days     Packs/day: 1.00     Years: 30.00     Pack years: 30.00     Types: Cigars, Cigarettes     Start date: 10/18/1980     Last attempt to quit: 10/18/2004     Years since quittin.9    Smokeless tobacco: Never    Tobacco comments:     5 cigars per day   Substance Use Topics    Alcohol use: No     Comment: occassionally    Drug use: No       Family History   Problem Relation Age of Onset    High Blood Pressure Mother     Heart Disease Father     Cancer Maternal Aunt         LIVER    Colon Polyps Neg Hx          Review of Systems        PHYSICAL EXAM:  /78   Pulse 87   Temp 96.9 °F (36.1 °C) (Infrared)   Resp 16   Ht 5' 10\" (1.778 m)   Wt 162 lb (73.5 kg)   SpO2 97%   BMI 23.24 kg/m²     Physical Exam  Constitutional:       Appearance: Normal appearance. HENT:      Head: Normocephalic and atraumatic.       Right Ear: External ear normal.      Left Ear: External ear normal.      Nose: Nose normal.      Mouth/Throat:      Mouth: Mucous membranes are moist.   Eyes:      Conjunctiva/sclera: Conjunctivae normal.   Cardiovascular:      Rate and Rhythm: Normal rate and regular rhythm. Pulses: Normal pulses. Heart sounds: Normal heart sounds. Pulmonary:      Effort: Pulmonary effort is normal.      Breath sounds: Normal breath sounds. Abdominal:      General: Bowel sounds are normal.      Palpations: Abdomen is soft. Musculoskeletal:         General: Tenderness present. Normal range of motion. Cervical back: Normal range of motion. Skin:     General: Skin is warm and dry. Neurological:      General: No focal deficit present. Mental Status: He is alert and oriented to person, place, and time. Psychiatric:         Mood and Affect: Mood normal.         Behavior: Behavior normal.         ASSESSMENT & PLAN  Heriberto Dominique was seen today for other. Diagnoses and all orders for this visit:    Rib pain on right side  -     lidocaine (LIDODERM) 5 %; Place 1 patch onto the skin daily 12 hours on, 12 hours off. Uncontrolled type 2 diabetes mellitus with hyperglycemia, with long-term current use of insulin ([facility])  -     Continuous Blood Gluc Sensor (FREESTYLE YUMI SENSOR SYSTEM) MISC; 1 device every 14 days    Declines imaging  Start Lidocaine patches   When no patch on use heat  Can use otc pain relievers  Brace ribs when coughing, laughing, sneezing    No follow-ups on file. ER precautions given. Advised to seek care immediately if any new or worsening symptoms. All copied or forwarded information in the progress note was verified by me to be accurate at the time of visit  Patient's past medical, surgical, social and family history were reviewed and updated     All patient questions answered. Patient voiced understanding.

## 2022-10-17 DIAGNOSIS — E11.65 UNCONTROLLED TYPE 2 DIABETES MELLITUS WITH HYPERGLYCEMIA, WITH LONG-TERM CURRENT USE OF INSULIN (HCC): ICD-10-CM

## 2022-10-17 DIAGNOSIS — Z79.4 UNCONTROLLED TYPE 2 DIABETES MELLITUS WITH HYPERGLYCEMIA, WITH LONG-TERM CURRENT USE OF INSULIN (HCC): ICD-10-CM

## 2022-10-17 RX ORDER — FLASH GLUCOSE SENSOR
KIT MISCELLANEOUS
Qty: 6 EACH | Refills: 3 | Status: SHIPPED | OUTPATIENT
Start: 2022-10-17

## 2022-10-17 RX ORDER — FLASH GLUCOSE SENSOR
KIT MISCELLANEOUS
Status: CANCELLED | OUTPATIENT
Start: 2022-10-17

## 2022-10-18 DIAGNOSIS — E11.65 UNCONTROLLED TYPE 2 DIABETES MELLITUS WITH HYPERGLYCEMIA (HCC): ICD-10-CM

## 2022-10-18 DIAGNOSIS — I10 BENIGN ESSENTIAL HTN: ICD-10-CM

## 2022-10-18 RX ORDER — AMLODIPINE BESYLATE 10 MG/1
TABLET ORAL
Qty: 90 TABLET | Refills: 3 | Status: SHIPPED | OUTPATIENT
Start: 2022-10-18

## 2022-12-16 DIAGNOSIS — F32.9 MAJOR DEPRESSIVE DISORDER WITH CURRENT ACTIVE EPISODE, UNSPECIFIED DEPRESSION EPISODE SEVERITY, UNSPECIFIED WHETHER RECURRENT: ICD-10-CM

## 2022-12-19 RX ORDER — DULOXETIN HYDROCHLORIDE 60 MG/1
CAPSULE, DELAYED RELEASE ORAL
Qty: 90 CAPSULE | Refills: 3 | Status: SHIPPED | OUTPATIENT
Start: 2022-12-19

## 2023-01-15 ENCOUNTER — HOSPITAL ENCOUNTER (INPATIENT)
Age: 58
LOS: 2 days | Discharge: HOME | DRG: 469 | End: 2023-01-17
Attending: INTERNAL MEDICINE | Admitting: INTERNAL MEDICINE
Payer: COMMERCIAL

## 2023-01-15 DIAGNOSIS — E11.65 UNCONTROLLED TYPE 2 DIABETES MELLITUS WITH HYPERGLYCEMIA (HCC): ICD-10-CM

## 2023-01-15 DIAGNOSIS — I10 ELEVATED BLOOD PRESSURE READING WITH DIAGNOSIS OF HYPERTENSION: ICD-10-CM

## 2023-01-15 DIAGNOSIS — I10 UNCONTROLLED HYPERTENSION: ICD-10-CM

## 2023-01-15 DIAGNOSIS — F32.A DEPRESSION WITH SUICIDAL IDEATION: Primary | ICD-10-CM

## 2023-01-15 DIAGNOSIS — I10 ESSENTIAL HYPERTENSION: ICD-10-CM

## 2023-01-15 DIAGNOSIS — N17.9 AKI (ACUTE KIDNEY INJURY) (HCC): ICD-10-CM

## 2023-01-15 DIAGNOSIS — R45.851 DEPRESSION WITH SUICIDAL IDEATION: Primary | ICD-10-CM

## 2023-01-15 LAB
ACETAMINOPHEN LEVEL: < 5 UG/ML (ref 0–20)
ALBUMIN SERPL-MCNC: 3.8 G/DL (ref 3.5–5.1)
ALP BLD-CCNC: 105 U/L (ref 38–126)
ALT SERPL-CCNC: 7 U/L (ref 11–66)
AMPHETAMINE+METHAMPHETAMINE URINE SCREEN: NEGATIVE
ANION GAP SERPL CALCULATED.3IONS-SCNC: 13 MEQ/L (ref 8–16)
ANION GAP SERPL CALCULATED.3IONS-SCNC: 14 MEQ/L (ref 8–16)
AST SERPL-CCNC: 12 U/L (ref 5–40)
AVERAGE GLUCOSE: 159 MG/DL (ref 70–126)
BARBITURATE QUANTITATIVE URINE: NEGATIVE
BASOPHILS # BLD: 0.5 %
BASOPHILS ABSOLUTE: 0.1 THOU/MM3 (ref 0–0.1)
BENZODIAZEPINE QUANTITATIVE URINE: NEGATIVE
BILIRUB SERPL-MCNC: 0.3 MG/DL (ref 0.3–1.2)
BUN BLDV-MCNC: 29 MG/DL (ref 7–22)
BUN BLDV-MCNC: 31 MG/DL (ref 7–22)
CALCIUM SERPL-MCNC: 8.8 MG/DL (ref 8.5–10.5)
CALCIUM SERPL-MCNC: 9.1 MG/DL (ref 8.5–10.5)
CANNABINOID QUANTITATIVE URINE: NEGATIVE
CHLORIDE BLD-SCNC: 103 MEQ/L (ref 98–111)
CHLORIDE BLD-SCNC: 97 MEQ/L (ref 98–111)
CO2: 23 MEQ/L (ref 23–33)
CO2: 25 MEQ/L (ref 23–33)
COCAINE METABOLITE QUANTITATIVE URINE: NEGATIVE
CREAT SERPL-MCNC: 2 MG/DL (ref 0.4–1.2)
CREAT SERPL-MCNC: 2.1 MG/DL (ref 0.4–1.2)
CREATININE, URINE: 41.7 MG/DL
EOSINOPHIL # BLD: 3.3 %
EOSINOPHILS ABSOLUTE: 0.3 THOU/MM3 (ref 0–0.4)
ERYTHROCYTE [DISTWIDTH] IN BLOOD BY AUTOMATED COUNT: 13.6 % (ref 11.5–14.5)
ERYTHROCYTE [DISTWIDTH] IN BLOOD BY AUTOMATED COUNT: 40.9 FL (ref 35–45)
ETHYL ALCOHOL, SERUM: < 0.01 %
FENTANYL: NEGATIVE
GFR SERPL CREATININE-BSD FRML MDRD: 36 ML/MIN/1.73M2
GFR SERPL CREATININE-BSD FRML MDRD: 38 ML/MIN/1.73M2
GLUCOSE BLD-MCNC: 179 MG/DL (ref 70–108)
GLUCOSE BLD-MCNC: 179 MG/DL (ref 70–108)
GLUCOSE BLD-MCNC: 182 MG/DL (ref 70–108)
GLUCOSE BLD-MCNC: 200 MG/DL (ref 70–108)
GLUCOSE BLD-MCNC: 207 MG/DL (ref 70–108)
GLUCOSE BLD-MCNC: 278 MG/DL (ref 70–108)
GLUCOSE BLD-MCNC: 298 MG/DL
GLUCOSE BLD-MCNC: 298 MG/DL (ref 70–108)
HBA1C MFR BLD: 7.3 % (ref 4.4–6.4)
HCT VFR BLD CALC: 28.1 % (ref 42–52)
HEMOGLOBIN: 9.7 GM/DL (ref 14–18)
IMMATURE GRANS (ABS): 0.03 THOU/MM3 (ref 0–0.07)
IMMATURE GRANULOCYTES: 0.3 %
LYMPHOCYTES # BLD: 16.2 %
LYMPHOCYTES ABSOLUTE: 1.7 THOU/MM3 (ref 1–4.8)
MCH RBC QN AUTO: 28.4 PG (ref 26–33)
MCHC RBC AUTO-ENTMCNC: 34.5 GM/DL (ref 32.2–35.5)
MCV RBC AUTO: 82.4 FL (ref 80–94)
MICROALBUMIN UR-MCNC: 221.18 MG/DL
MICROALBUMIN/CREAT UR-RTO: 5304 MG/G (ref 0–30)
MONOCYTES # BLD: 6.9 %
MONOCYTES ABSOLUTE: 0.7 THOU/MM3 (ref 0.4–1.3)
NUCLEATED RED BLOOD CELLS: 0 /100 WBC
OPIATES, URINE: NEGATIVE
OSMOLALITY CALCULATION: 288.5 MOSMOL/KG (ref 275–300)
OXYCODONE: NEGATIVE
PHENCYCLIDINE QUANTITATIVE URINE: NEGATIVE
PLATELET # BLD: 230 THOU/MM3 (ref 130–400)
PMV BLD AUTO: 10.6 FL (ref 9.4–12.4)
POTASSIUM REFLEX MAGNESIUM: 3.6 MEQ/L (ref 3.5–5.2)
POTASSIUM SERPL-SCNC: 3.8 MEQ/L (ref 3.5–5.2)
RBC # BLD: 3.41 MILL/MM3 (ref 4.7–6.1)
SALICYLATE, SERUM: < 0.3 MG/DL (ref 2–10)
SEG NEUTROPHILS: 72.8 %
SEGMENTED NEUTROPHILS ABSOLUTE COUNT: 7.7 THOU/MM3 (ref 1.8–7.7)
SODIUM BLD-SCNC: 136 MEQ/L (ref 135–145)
SODIUM BLD-SCNC: 139 MEQ/L (ref 135–145)
TOTAL PROTEIN: 7.2 G/DL (ref 6.1–8)
WBC # BLD: 10.6 THOU/MM3 (ref 4.8–10.8)

## 2023-01-15 PROCEDURE — 85025 COMPLETE CBC W/AUTO DIFF WBC: CPT

## 2023-01-15 PROCEDURE — 80053 COMPREHEN METABOLIC PANEL: CPT

## 2023-01-15 PROCEDURE — 80307 DRUG TEST PRSMV CHEM ANLYZR: CPT

## 2023-01-15 PROCEDURE — 83036 HEMOGLOBIN GLYCOSYLATED A1C: CPT

## 2023-01-15 PROCEDURE — 1200000000 HC SEMI PRIVATE

## 2023-01-15 PROCEDURE — 82948 REAGENT STRIP/BLOOD GLUCOSE: CPT

## 2023-01-15 PROCEDURE — 96360 HYDRATION IV INFUSION INIT: CPT

## 2023-01-15 PROCEDURE — 82077 ASSAY SPEC XCP UR&BREATH IA: CPT

## 2023-01-15 PROCEDURE — 93005 ELECTROCARDIOGRAM TRACING: CPT | Performed by: PHYSICIAN ASSISTANT

## 2023-01-15 PROCEDURE — 80143 DRUG ASSAY ACETAMINOPHEN: CPT

## 2023-01-15 PROCEDURE — 6360000002 HC RX W HCPCS: Performed by: STUDENT IN AN ORGANIZED HEALTH CARE EDUCATION/TRAINING PROGRAM

## 2023-01-15 PROCEDURE — 82043 UR ALBUMIN QUANTITATIVE: CPT

## 2023-01-15 PROCEDURE — 80179 DRUG ASSAY SALICYLATE: CPT

## 2023-01-15 PROCEDURE — 6370000000 HC RX 637 (ALT 250 FOR IP): Performed by: STUDENT IN AN ORGANIZED HEALTH CARE EDUCATION/TRAINING PROGRAM

## 2023-01-15 PROCEDURE — 2580000003 HC RX 258: Performed by: PHYSICIAN ASSISTANT

## 2023-01-15 PROCEDURE — 99285 EMERGENCY DEPT VISIT HI MDM: CPT

## 2023-01-15 PROCEDURE — 36415 COLL VENOUS BLD VENIPUNCTURE: CPT

## 2023-01-15 PROCEDURE — 2580000003 HC RX 258: Performed by: STUDENT IN AN ORGANIZED HEALTH CARE EDUCATION/TRAINING PROGRAM

## 2023-01-15 RX ORDER — ONDANSETRON 2 MG/ML
4 INJECTION INTRAMUSCULAR; INTRAVENOUS EVERY 6 HOURS PRN
Status: DISCONTINUED | OUTPATIENT
Start: 2023-01-15 | End: 2023-01-17 | Stop reason: HOSPADM

## 2023-01-15 RX ORDER — 0.9 % SODIUM CHLORIDE 0.9 %
1000 INTRAVENOUS SOLUTION INTRAVENOUS ONCE
Status: COMPLETED | OUTPATIENT
Start: 2023-01-15 | End: 2023-01-15

## 2023-01-15 RX ORDER — INSULIN GLARGINE 100 [IU]/ML
50 INJECTION, SOLUTION SUBCUTANEOUS NIGHTLY
Status: CANCELLED | OUTPATIENT
Start: 2023-01-15

## 2023-01-15 RX ORDER — ACETAMINOPHEN 325 MG/1
650 TABLET ORAL EVERY 6 HOURS PRN
Status: DISCONTINUED | OUTPATIENT
Start: 2023-01-15 | End: 2023-01-17 | Stop reason: HOSPADM

## 2023-01-15 RX ORDER — ACETAMINOPHEN 650 MG/1
650 SUPPOSITORY RECTAL EVERY 6 HOURS PRN
Status: DISCONTINUED | OUTPATIENT
Start: 2023-01-15 | End: 2023-01-17 | Stop reason: HOSPADM

## 2023-01-15 RX ORDER — SODIUM CHLORIDE 9 MG/ML
INJECTION, SOLUTION INTRAVENOUS CONTINUOUS
Status: DISCONTINUED | OUTPATIENT
Start: 2023-01-15 | End: 2023-01-17 | Stop reason: HOSPADM

## 2023-01-15 RX ORDER — INSULIN LISPRO 100 [IU]/ML
0-4 INJECTION, SOLUTION INTRAVENOUS; SUBCUTANEOUS NIGHTLY
Status: DISCONTINUED | OUTPATIENT
Start: 2023-01-15 | End: 2023-01-17 | Stop reason: HOSPADM

## 2023-01-15 RX ORDER — DEXTROSE MONOHYDRATE 100 MG/ML
INJECTION, SOLUTION INTRAVENOUS CONTINUOUS PRN
Status: DISCONTINUED | OUTPATIENT
Start: 2023-01-15 | End: 2023-01-17 | Stop reason: HOSPADM

## 2023-01-15 RX ORDER — INSULIN LISPRO 100 [IU]/ML
0-8 INJECTION, SOLUTION INTRAVENOUS; SUBCUTANEOUS
Status: DISCONTINUED | OUTPATIENT
Start: 2023-01-15 | End: 2023-01-17 | Stop reason: HOSPADM

## 2023-01-15 RX ORDER — GABAPENTIN 400 MG/1
CAPSULE ORAL
Status: ON HOLD | COMMUNITY
Start: 2022-10-25 | End: 2023-01-17 | Stop reason: HOSPADM

## 2023-01-15 RX ORDER — SODIUM CHLORIDE 0.9 % (FLUSH) 0.9 %
5-40 SYRINGE (ML) INJECTION EVERY 12 HOURS SCHEDULED
Status: DISCONTINUED | OUTPATIENT
Start: 2023-01-15 | End: 2023-01-17 | Stop reason: HOSPADM

## 2023-01-15 RX ORDER — SODIUM CHLORIDE 9 MG/ML
INJECTION, SOLUTION INTRAVENOUS PRN
Status: DISCONTINUED | OUTPATIENT
Start: 2023-01-15 | End: 2023-01-17 | Stop reason: HOSPADM

## 2023-01-15 RX ORDER — POLYETHYLENE GLYCOL 3350 17 G/17G
17 POWDER, FOR SOLUTION ORAL DAILY PRN
Status: DISCONTINUED | OUTPATIENT
Start: 2023-01-15 | End: 2023-01-17 | Stop reason: HOSPADM

## 2023-01-15 RX ORDER — ONDANSETRON 4 MG/1
4 TABLET, ORALLY DISINTEGRATING ORAL EVERY 8 HOURS PRN
Status: DISCONTINUED | OUTPATIENT
Start: 2023-01-15 | End: 2023-01-17 | Stop reason: HOSPADM

## 2023-01-15 RX ORDER — AMLODIPINE BESYLATE 10 MG/1
10 TABLET ORAL DAILY
Status: DISCONTINUED | OUTPATIENT
Start: 2023-01-15 | End: 2023-01-15

## 2023-01-15 RX ORDER — SODIUM CHLORIDE 0.9 % (FLUSH) 0.9 %
5-40 SYRINGE (ML) INJECTION PRN
Status: DISCONTINUED | OUTPATIENT
Start: 2023-01-15 | End: 2023-01-17 | Stop reason: HOSPADM

## 2023-01-15 RX ORDER — PANTOPRAZOLE SODIUM 40 MG/1
40 TABLET, DELAYED RELEASE ORAL
Status: DISCONTINUED | OUTPATIENT
Start: 2023-01-15 | End: 2023-01-17 | Stop reason: HOSPADM

## 2023-01-15 RX ORDER — HYDRALAZINE HYDROCHLORIDE 20 MG/ML
10 INJECTION INTRAMUSCULAR; INTRAVENOUS EVERY 6 HOURS PRN
Status: DISCONTINUED | OUTPATIENT
Start: 2023-01-15 | End: 2023-01-17 | Stop reason: HOSPADM

## 2023-01-15 RX ORDER — ENOXAPARIN SODIUM 100 MG/ML
40 INJECTION SUBCUTANEOUS DAILY
Status: DISCONTINUED | OUTPATIENT
Start: 2023-01-15 | End: 2023-01-17 | Stop reason: HOSPADM

## 2023-01-15 RX ORDER — AMLODIPINE BESYLATE 10 MG/1
10 TABLET ORAL DAILY
Status: DISCONTINUED | OUTPATIENT
Start: 2023-01-15 | End: 2023-01-17 | Stop reason: HOSPADM

## 2023-01-15 RX ADMIN — PANTOPRAZOLE SODIUM 40 MG: 40 TABLET, DELAYED RELEASE ORAL at 05:53

## 2023-01-15 RX ADMIN — INSULIN LISPRO 2 UNITS: 100 INJECTION, SOLUTION INTRAVENOUS; SUBCUTANEOUS at 12:58

## 2023-01-15 RX ADMIN — ENOXAPARIN SODIUM 40 MG: 100 INJECTION SUBCUTANEOUS at 08:38

## 2023-01-15 RX ADMIN — SODIUM CHLORIDE: 9 INJECTION, SOLUTION INTRAVENOUS at 15:44

## 2023-01-15 RX ADMIN — SODIUM CHLORIDE: 9 INJECTION, SOLUTION INTRAVENOUS at 05:53

## 2023-01-15 RX ADMIN — AMLODIPINE BESYLATE 10 MG: 10 TABLET ORAL at 05:53

## 2023-01-15 RX ADMIN — SODIUM CHLORIDE 1000 ML: 9 INJECTION, SOLUTION INTRAVENOUS at 03:12

## 2023-01-15 ASSESSMENT — ENCOUNTER SYMPTOMS
SHORTNESS OF BREATH: 0
RHINORRHEA: 0
NAUSEA: 0
ABDOMINAL PAIN: 0
COUGH: 0
VOMITING: 0

## 2023-01-15 ASSESSMENT — PATIENT HEALTH QUESTIONNAIRE - PHQ9: SUM OF ALL RESPONSES TO PHQ QUESTIONS 1-9: 3

## 2023-01-15 ASSESSMENT — SLEEP AND FATIGUE QUESTIONNAIRES
DO YOU HAVE DIFFICULTY SLEEPING: NO
AVERAGE NUMBER OF SLEEP HOURS: 7
DO YOU USE A SLEEP AID: NO

## 2023-01-15 ASSESSMENT — PAIN SCALES - GENERAL
PAINLEVEL_OUTOF10: 0
PAINLEVEL_OUTOF10: 8

## 2023-01-15 ASSESSMENT — LIFESTYLE VARIABLES
HOW MANY STANDARD DRINKS CONTAINING ALCOHOL DO YOU HAVE ON A TYPICAL DAY: PATIENT DOES NOT DRINK
HOW OFTEN DO YOU HAVE A DRINK CONTAINING ALCOHOL: NEVER

## 2023-01-15 ASSESSMENT — VISUAL ACUITY: OU: 1

## 2023-01-15 ASSESSMENT — PAIN - FUNCTIONAL ASSESSMENT
PAIN_FUNCTIONAL_ASSESSMENT: NONE - DENIES PAIN
PAIN_FUNCTIONAL_ASSESSMENT: 0-10

## 2023-01-15 NOTE — ED NOTES
ED to inpatient nurses report    Chief Complaint   Patient presents with    Suicidal      Present to ED from home  LOC: alert and orientated to name, place, date  Vital signs   Vitals:    01/15/23 0033 01/15/23 0106 01/15/23 0313   BP: (!) 201/106 (!) 186/101 (!) 153/90   Pulse: 85 79 76   Resp: 16 16 13   Temp: 97.6 °F (36.4 °C)     TempSrc: Oral     SpO2: 100% 99% 96%   Weight: 150 lb (68 kg)     Height: 5' 8\" (1.727 m)        Oxygen Baseline RA    Current needs required none Bipap/Cpap No  LDAs:   Peripheral IV 01/15/23 Left Antecubital (Active)     Mobility: Independent  Pending ED orders: none  Present condition: stable    C-SSRS Risk of Suicide: Low Risk  Swallow Screening    Preferred Language: [place]     Electronically signed by Rafael Cr RN on 1/15/2023 at [address], RN  01/15/23 [id number]

## 2023-01-15 NOTE — ED NOTES
Pt to ED via [facility] for [facility] for psych evaluation, as pt made statements to son about wanting to kill self with use of pills. Pt states he made statements due to stressors in marriage and illnesses within family. Pt states depressive thoughts over previous two days. Pt states no suicidal intent at this time, states no previous attempts. Pt states he made statements due to stress, states he did not mean them. Pt states no alcohol use, no drug use. Pt breaths easy and unlabored at this time, EKG to be done due to hypertension upon arrival.  No distress noted at this time. Patient placed in safe room that is ligature resistant. Provider notified, requested an assessment by behavioral health . Patient belongings secured in a locked lockers outside of the room. Explained suicide prevention precautions to the patient.          [facility]  01/15/23 1261

## 2023-01-15 NOTE — H&P
History & Physical      Patient: Natasha Navarrete  YOB: 1965    MRN: [id number]  Acct: [de-identified]    PCP: Digna Mendoza DO    Date of Admission: 1/15/2023    Date of Service: Patient seen / examined on 01/15/23 and admitted to Inpatient with expected LOS greater than two midnights due to medical therapy. ASSESSMENT / PLAN:  Suicidal ideation - Patient told family he was going to [facility] and kill himself. [facility] was contacted and he was placed under [facility] and transported to [facility]. He denies prior attempts or threats however per chart family has stated otherwise. No family at bedside to confirm  - Psychiatry consult  - Bedside sitter  - Safety food tray    Stage 2 BASIL on CKD 3b - Cr=2.1 on baseline of ~1 with worsening eGFR over last 2 years. - Continue IVF  - Avoid nephrotoxic agents and renally dose medications  - Daily standing weights and strict I/O's  - Daily BMP  - Mircoalbumin/Cr ratio    IDDM2 - Uncontrolled. A1c=7.4% (8/5/22). Follows with Dr. Oren Zhang (PCP). - A1c labs  - Medium dose ssi, hypoglycemia protocol, POCT glucose 4x daily AC/HS    HTN - Uncontrolled. Patient takes Amlodipine, Lisinopril-HCTZ, Metoprolol per chart review but patient is uncertain of home medications or dosages. 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 Reports surgery was at [facility] approximately 6-8 months ago. Medication reconciliation - Patient is uncertain of home medications. Nursing/pharmacy to assist    Chief Complaint: Suicidal ideation    History of Present Illness:  Natasha Navarrete is a 65 y.o. male with PMHx of NIDDM2 ,HTN, HLD, GERD and CKD who presented to [address] [place] [address] with threat of suicidal ideation.  Was transported to [facility] via [employer] and [facility] placed. Patient lives with his son and stated that he wanted to \"end it all\" and overdose on his home pills after finding out his wife whom he was trying to reconcile with was cheating on him. He stated he has had no prior attempts and stated his comments were made while in an upset/desperate state but would not actually follow through with his plan. He denies access to firearms, current drug abuse or illicit drug use. He admits to being under a lot of financial stress lately as well as dealing with illnesses in his family. He recently applied for disability but was denied and that is also adding a large amount of stress to his situation. The patient is a poor historian of past medical history and had a difficult time recalling his home medications, dosages, and recent below knee amputation on the right. He denies fever, chills, headache, lightheadedness, change in vision, rhinorrhea, congestion, sore throat, cough, hemoptysis, chest pain, palpitations, GARDINER, PND, shortness of breath, abdominal pain, nausea, vomiting, hematemesis, change in bowel/bladder habits, hematochezia, hematuria, weakness, difficulty with ambulation, numbness/tingling, or known exposure to sick contacts. The patient was admitted to the hospital service for further care and management. Please see A&P above for additional information.     Past Medical History:  Past Medical History:   Diagnosis Date    Abscess of leg     Allergic rhinitis     Asthma     Blood circulation, collateral     numbness in hands and feet    GERD (gastroesophageal reflux disease)     Headache(784.0)     Hyperlipidemia     Hypertension     Neuropathy     Osteoarthritis     Other disorders of kidney and ureter     Pneumonia     Type II or unspecified type diabetes mellitus without mention of complication, not stated as uncontrolled     type 2       Past Surgical History:  Past Surgical History:   Procedure Laterality Date APPENDECTOMY  5/31/12    bree Harvey    ARM SURGERY      CARDIAC CATHETERIZATION  2007    WNL, no stents     COLONOSCOPY Left 2/26/2018    COLONOSCOPY POLYPECTOMY HOT BIOPSY performed by Nile Moreno MD at [facility]    FOOT SURGERY Right 2008    Rt leg    HAND SURGERY Left 2010    VASCULAR SURGERY      pinched nerve left arm       Medications Prior to Admission:  No current facility-administered medications on file prior to encounter. Current Outpatient Medications on File Prior to Encounter   Medication Sig Dispense Refill    DULoxetine (CYMBALTA) 60 MG extended release capsule take 1 capsule by mouth once daily 90 capsule 3    metFORMIN (GLUCOPHAGE) 1000 MG tablet take 1 tablet by mouth twice a day with meals 180 tablet 3    amLODIPine (NORVASC) 10 MG tablet take 1 tablet by mouth once daily 90 tablet 3    Continuous Blood Gluc Sensor (FREESTYLE YUMI SENSOR SYSTEM) MISC 1 device every 14 days 6 each 3    lidocaine (LIDODERM) 5 % Place 1 patch onto the skin daily 12 hours on, 12 hours off. 30 patch 0    Misc. Devices [facility]) MISC 2 each by Does not apply route daily Anti tip device for wheelchair. 2 each 0    pantoprazole (PROTONIX) 40 MG tablet Take 1 tablet by mouth every morning (before breakfast) 90 tablet 0    insulin glargine (LANTUS) 100 UNIT/ML injection vial Inject 50 Units into the skin nightly 4 each 0    insulin lispro (HUMALOG) 100 UNIT/ML injection vial ACHS Sliding scale Blood sugar 0-199  0 units                 200- 249-  2 units                 250-299    4 units                 300-349    6 units                 350-399    8 units 4 each 0    gabapentin (NEURONTIN) 300 MG capsule Take 1 capsule by mouth daily for 90 days.  90 capsule 0    Continuous Blood Gluc  (FREESTYLE YUMI READER) JESU 1 Device by Does not apply route 3 times daily (before meals) 1 each 5    pravastatin (PRAVACHOL) 40 MG tablet Take 1 tablet by mouth daily 90 tablet 0 lisinopril-hydroCHLOROthiazide (PRINZIDE;ZESTORETIC) 20-25 MG per tablet Take 1 tablet by mouth daily 90 tablet 1    metoprolol tartrate (LOPRESSOR) 50 MG tablet take 1 tablet by mouth twice a day 180 tablet 3    cyclobenzaprine (FLEXERIL) 10 MG tablet cyclobenzaprine cyclobenzaprine 10 mg oral tablet 3/16/2018 take 1 tablet (10 mg) by oral route 3 times per day 03-  [facility] 87 ([id number])      aspirin 325 MG EC tablet Take 1 tablet by mouth daily 90 tablet 0    albuterol sulfate HFA (PROVENTIL HFA) 108 (90 Base) MCG/ACT inhaler Inhale 2 puffs into the lungs every 6 hours as needed for Wheezing 1 each 3    loratadine (CLARITIN) 10 MG tablet loratadine loratadine 10 mg oral tablet take 1 tablet (10 mg) by oral route once daily   [facility] 87 ([id number]) 90 tablet 0    blood glucose test strips (GLUCOSE METER TEST) strip 1 each by In Vitro route daily Check blood sugar q daily Dx E11.9 100 strip 5    blood glucose monitor kit and supplies Check blood sugar q daily, Dx E11.9 1 kit 0    ondansetron (ZOFRAN) 4 MG tablet Take 1 tablet by mouth 3 times daily as needed for Nausea or Vomiting 15 tablet 0    dicyclomine (BENTYL) 20 MG tablet Take 1 tablet by mouth 4 times daily (before meals and nightly) 20 tablet 0    gemfibrozil (LOPID) 600 MG tablet TAKE 1 TABLET BY MOUTH TWICE DAILY 180 tablet 1    glucose monitoring kit (FREESTYLE) monitoring kit 1 kit by Does not apply route daily as needed 1 kit 0    fluticasone-salmeterol (ADVAIR DISKUS) 250-50 MCG/DOSE AEPB Inhale 1 puff into the lungs every 12 hours 3 each 1       Allergies:  Cephalexin, Januvia [sitagliptin], and Quinapril hcl    Social History:  Social History     Socioeconomic History    Marital status:      Spouse name: Not on file    Number of children: Not on file    Years of education: Not on file    Highest education level: Not on file   Occupational History    Not on file   Tobacco Use    Smoking status: Some Days     Packs/day: 1.00     Years: 30.00     Pack years: 30.00     Types: Cigars, Cigarettes     Start date: 10/18/1980     Last attempt to quit: 10/18/2004     Years since quittin.2    Smokeless tobacco: Never    Tobacco comments:     5 cigars per day   Substance and Sexual Activity    Alcohol use: No     Comment: occassionally    Drug use: No    Sexual activity: Yes     Partners: Female   Other Topics Concern    Not on file   Social History Narrative    Not on file     Social Determinants of Health     Financial Resource Strain: Not on file   Food Insecurity: Not on file   Transportation Needs: Not on file   Physical Activity: Not on file   Stress: Not on file   Social Connections: Not on file   Intimate Partner Violence: Not on file   Housing Stability: Not on file       Family History:   Family History   Problem Relation Age of Onset    High Blood Pressure Mother     Heart Disease Father     Cancer Maternal Aunt         LIVER    Colon Polyps Neg Hx        REVIEW OF SYSTEMS:  A 14-point ROS was obtained and negative, with the exception of pertinent positives as listed above in HPI. PHYSICAL EXAM:  Vitals:    01/15/23 0033 01/15/23 0106 01/15/23 0313   BP: (!) 201/106 (!) 186/101 (!) 153/90   Pulse: 85 79 76   Resp: 16 16 13   Temp: 97.6 °F (36.4 °C)     TempSrc: Oral     SpO2: 100% 99% 96%   Weight: 150 lb (68 kg)     Height: 5' 8\" (1.727 m)       Physical Exam  Vitals and nursing note reviewed. Constitutional:       General: He is awake. Appearance: Normal appearance. He is normal weight. HENT:      Head: Normocephalic and atraumatic. Right Ear: External ear normal.      Left Ear: External ear normal.      Nose: Nose normal.      Mouth/Throat:      Mouth: Mucous membranes are moist.      Pharynx: Oropharynx is clear. Eyes:      General: Lids are normal. Vision grossly intact.       Conjunctiva/sclera: Conjunctivae normal.      Pupils: Pupils are unequal.      Right eye: Pupil is round and reactive. Left eye: Pupil is round and reactive. Comments: Left pupil 6mm; secondary to prior eye injury from extension cord   Cardiovascular:      Rate and Rhythm: Normal rate and regular rhythm. Pulses: Normal pulses. Heart sounds: Normal heart sounds. Pulmonary:      Effort: Pulmonary effort is normal.      Breath sounds: Normal breath sounds. Abdominal:      General: Bowel sounds are normal.      Palpations: Abdomen is soft. Musculoskeletal:         General: Normal range of motion. Cervical back: Normal range of motion and neck supple. Right Lower Extremity: Right leg is amputated below knee. Skin:     General: Skin is warm and dry. Capillary Refill: Capillary refill takes less than 2 seconds. Neurological:      General: No focal deficit present. Mental Status: He is alert and oriented to person, place, and time. Mental status is at baseline. GCS: GCS eye subscore is 4. GCS verbal subscore is 5. GCS motor subscore is 6. Cranial Nerves: Cranial nerves 2-12 are intact. Sensory: Sensation is intact. Motor: Motor function is intact. Coordination: Coordination is intact. Psychiatric:         Attention and Perception: Attention and perception normal.         Mood and Affect: Affect normal. Mood is depressed. Speech: Speech is delayed. Behavior: Behavior normal. Behavior is cooperative. Thought Content: Thought content includes suicidal ideation. Thought content includes suicidal plan.          Cognition and Memory: Cognition and memory normal.         Judgment: Judgment normal.         Labs:  Results for orders placed or performed during the hospital encounter of 01/15/23   CBC with Auto Differential   Result Value Ref Range    WBC 10.6 4.8 - 10.8 thou/mm3    RBC 3.41 (L) 4.70 - 6.10 mill/mm3    Hemoglobin 9.7 (L) 14.0 - 18.0 gm/dl    Hematocrit 28.1 (L) 42.0 - 52.0 %    MCV 82.4 80.0 - 94.0 fL    MCH 28.4 26.0 - 33.0 pg    MCHC 34.5 32.2 - 35.5 gm/dl    RDW-CV 13.6 11.5 - 14.5 %    RDW-SD 40.9 35.0 - 45.0 fL    Platelets 438 232 - 067 thou/mm3    MPV 10.6 9.4 - 12.4 fL    Seg Neutrophils 72.8 %    Lymphocytes 16.2 %    Monocytes 6.9 %    Eosinophils 3.3 %    Basophils 0.5 %    Immature Granulocytes 0.3 %    Segs Absolute 7.7 1.8 - 7.7 thou/mm3    Lymphocytes Absolute 1.7 1.0 - 4.8 thou/mm3    Monocytes Absolute 0.7 0.4 - 1.3 thou/mm3    Eosinophils Absolute 0.3 0.0 - 0.4 thou/mm3    Basophils Absolute 0.1 0.0 - 0.1 thou/mm3    Immature Grans (Abs) 0.03 0.00 - 0.07 thou/mm3    nRBC 0 /100 wbc   Comprehensive Metabolic Panel   Result Value Ref Range    Glucose 278 (H) 70 - 108 mg/dL    Creatinine 2.1 (H) 0.4 - 1.2 mg/dL    BUN 31 (H) 7 - 22 mg/dL    Sodium 136 135 - 145 meq/L    Potassium 3.8 3.5 - 5.2 meq/L    Chloride 97 (L) 98 - 111 meq/L    CO2 25 23 - 33 meq/L    Calcium 9.1 8.5 - 10.5 mg/dL    AST 12 5 - 40 U/L    Alkaline Phosphatase 105 38 - 126 U/L    Total Protein 7.2 6.1 - 8.0 g/dL    Albumin 3.8 3.5 - 5.1 g/dL    Total Bilirubin 0.3 0.3 - 1.2 mg/dL    ALT 7 (L) 11 - 66 U/L   Acetaminophen Level   Result Value Ref Range    Acetaminophen Level < 5.0 0.0 - 80.3 ug/mL   Salicylate   Result Value Ref Range    Salicylate, Serum < 0.3 (L) 2.0 - 10.0 mg/dL   Ethanol   Result Value Ref Range    ETHYL ALCOHOL, SERUM < 0.01 0.00 %   Urine Drug Screen   Result Value Ref Range    Amphetamine+Methamphetamine Urine Screen Negative NEGATIVE    Barbiturate Quant, Ur Negative NEGATIVE    Benzodiazepine Quant, Ur Negative NEGATIVE    Cannabinoid Quant, Ur Negative NEGATIVE    Cocaine Metab Quant, Ur Negative NEGATIVE    Opiates, Urine Negative NEGATIVE    Oxycodone Negative NEGATIVE    PCP Quant, Ur Negative NEGATIVE    Fentanyl Negative NEGATIVE   Anion Gap   Result Value Ref Range    Anion Gap 14.0 8.0 - 16.0 meq/L   Glomerular Filtration Rate, Estimated   Result Value Ref Range    Est, Glom Filt Rate 36 (A) >60 ml/min/1.73m2 Osmolality   Result Value Ref Range    Osmolality Calc 288.5 275.0 - 300.0 mOsmol/kg   POCT glucose   Result Value Ref Range    Glucose 298 mg/dL   POCT Glucose   Result Value Ref Range    POC Glucose 298 (H) 70 - 108 mg/dl   EKG Emergency   Result Value Ref Range    Ventricular Rate 82 BPM    Atrial Rate 82 BPM    P-R Interval 178 ms    QRS Duration 84 ms    Q-T Interval 368 ms    QTc Calculation (Bazett) 429 ms    P Axis 59 degrees    R Axis 23 degrees    T Axis 43 degrees       EKG / Radiology:    EKG:  Reviewed by me --    CXR:  Reviewed by me --    No results found. FEN/GI/DVT:  IVF: NS @ 125cc/hr  Electrolytes: Monitor and replace per protocols  Diet: Diabetic  GI PPX: On PPI for GERD  DVT Prophylaxis: Lovenox    CODE STATUS:  Full    There are no active hospital problems to display for this patient. Thank you Nicole Cintron DO for the opportunity to be involved in this patient's care.     Electronically signed by Tonya Carpenter DO, MBA on 1/15/2023 at 3:38 AM

## 2023-01-15 NOTE — CONSULTS
Department of Psychiatry   Psychiatric Assessment      Thank you very much for allowing us to participate in the care of this patient. Reason for Consult: Suicidal ideation    HISTORY OF PRESENT ILLNESS:      Patient is a 71-year-old male with no significant psychiatric history presented initially for worsening suicidal thoughts and a plan to kill self or overdose that he reported to his family member. Patient is admitted to the medical floor for a care. Patient does report that he has been feeling depressed for a week as he found out that his wife has been cheating on him. Reports that he has been feeling somewhat helpless and hopeless at times. Did report making these statements to his son however adamantly denies that he is having any suicidal thoughts now. Mentions he felt very low after arguing with his wife at that point. Continues to report some feelings of sad down and depression secondary to ongoing stressors. Reports good sleep and appetite. Denies any trouble with focus or concentration. Could not elicit any manic or hypomanic symptoms. Could not elicit any psychotic symptoms today. Offered inpatient psychiatric hospitalization however patient is not interested in this at this time. Discussed with him over trying Lexapro to help with his mood and he is agreeable. PSYCHIATRIC HISTORY:      Outpatient psychiatric provider: Denies any  Suicide attempts: none  Inpatient psychiatric admissions: denies any  Lifetime Psychiatric Review of Systems         Obsessions and Compulsions: Denies       Helga or Hypomania: Denies     Hallucinations: Denies     Panic Attacks:  Denies     Delusions:  Denies     Phobias:  Denies     Trauma: Denies    Prior to Admission medications    Medication Sig Start Date End Date Taking?  Authorizing Provider   DULoxetine (CYMBALTA) 60 MG extended release capsule take 1 capsule by mouth once daily 12/19/22   Digna Mendoza DO   metFORMIN (GLUCOPHAGE) 1000 MG tablet take 1 tablet by mouth twice a day with meals 10/18/22   Larry Jorge DO   amLODIPine (NORVASC) 10 MG tablet take 1 tablet by mouth once daily 10/18/22   Larry Jorge DO   Continuous Blood Gluc Sensor (FREESTYLE YUMI SENSOR SYSTEM) MISC 1 device every 14 days 10/17/22   Larry Jorge DO   lidocaine (LIDODERM) 5 % Place 1 patch onto the skin daily 12 hours on, 12 hours off. 10/6/22   DEV Garcia CNPc. Devices [facility]) MISC 2 each by Does not apply route daily Anti tip device for wheelchair. 9/26/22   DEV Guerrier CNP   pantoprazole (PROTONIX) 40 MG tablet Take 1 tablet by mouth every morning (before breakfast) 9/26/22   DEV Guerrier CNP   insulin glargine (LANTUS) 100 UNIT/ML injection vial Inject 50 Units into the skin nightly 9/26/22   DEV Guerrier CNP   insulin lispro (HUMALOG) 100 UNIT/ML injection vial ACHS Sliding scale Blood sugar 0-199  0 units                 200- 249-  2 units                 250-299    4 units                 300-349    6 units                 350-399    8 units 9/26/22   DEV Ruiz CNP   gabapentin (NEURONTIN) 300 MG capsule Take 1 capsule by mouth daily for 90 days.  6/21/22 9/19/22  DEV Guerrier CNP   Continuous Blood Gluc  (FREESTYLE YUMI READER) JESU 1 Device by Does not apply route 3 times daily (before meals) 6/21/22   DEV Guerrier CNP   pravastatin (PRAVACHOL) 40 MG tablet Take 1 tablet by mouth daily 6/21/22   DEV Guerrier CNP   lisinopril-hydroCHLOROthiazide (PRINZIDE;ZESTORETIC) 20-25 MG per tablet Take 1 tablet by mouth daily 6/21/22   DEV Guerrier CNP   metoprolol tartrate (LOPRESSOR) 50 MG tablet take 1 tablet by mouth twice a day 5/9/22   Larry Jorge DO   cyclobenzaprine (FLEXERIL) 10 MG tablet cyclobenzaprine cyclobenzaprine 10 mg oral tablet 3/16/2018 take 1 tablet (10 mg) by oral route 3 times per day 03-  [facility] ([id number]) 3/16/18   Historical Provider, MD   aspirin 325 MG EC tablet Take 1 tablet by mouth daily 4/11/22   Ivonne Hernandez, DO   albuterol sulfate HFA (PROVENTIL HFA) 108 (90 Base) MCG/ACT inhaler Inhale 2 puffs into the lungs every 6 hours as needed for Wheezing 4/11/22   Ivonne Hernandez, DO   loratadine (CLARITIN) 10 MG tablet loratadine loratadine 10 mg oral tablet take 1 tablet (10 mg) by oral route once daily   [facility] ([id number]) 4/11/22   Ivonne Hernandez, DO   blood glucose test strips (GLUCOSE METER TEST) strip 1 each by In Vitro route daily Check blood sugar q daily Dx E11.9 4/11/22   Ivonne Hernandez, DO   blood glucose monitor kit and supplies Check blood sugar q daily, Dx E11.9 4/11/22   Ivonne Hernandez, DO   ondansetron (ZOFRAN) 4 MG tablet Take 1 tablet by mouth 3 times daily as needed for Nausea or Vomiting 9/8/21   Yodit Phoenix, DO   dicyclomine (BENTYL) 20 MG tablet Take 1 tablet by mouth 4 times daily (before meals and nightly) 1/26/20   Kim Ye MD   gemfibrozil (LOPID) 600 MG tablet TAKE 1 TABLET BY MOUTH TWICE DAILY 7/19/16   Joseph Padgett MD   glucose monitoring kit (FREESTYLE) monitoring kit 1 kit by Does not apply route daily as needed 3/30/16   Joseph Padgett MD   fluticasone-salmeterol (ADVAIR DISKUS) 250-50 MCG/DOSE AEPB Inhale 1 puff into the lungs every 12 hours 10/6/15   Mariela Knee, APRN - CNP        Medications:    Current Facility-Administered Medications: sodium chloride flush 0.9 % injection 5-40 mL, 5-40 mL, IntraVENous, 2 times per day  sodium chloride flush 0.9 % injection 5-40 mL, 5-40 mL, IntraVENous, PRN  0.9 % sodium chloride infusion, , IntraVENous, PRN  enoxaparin (LOVENOX) injection 40 mg, 40 mg, SubCUTAneous, Daily  ondansetron (ZOFRAN-ODT) disintegrating tablet 4 mg, 4 mg, Oral, Q8H PRN **OR** ondansetron (ZOFRAN) injection 4 mg, 4 mg, IntraVENous, Q6H PRN  polyethylene glycol (GLYCOLAX) packet 17 g, 17 g, Oral, Daily PRN  acetaminophen (TYLENOL) tablet 650 mg, 650 mg, Oral, Q6H PRN **OR** acetaminophen (TYLENOL) suppository 650 mg, 650 mg, Rectal, Q6H PRN  glucose chewable tablet 16 g, 4 tablet, Oral, PRN  dextrose bolus 10% 125 mL, 125 mL, IntraVENous, PRN **OR** dextrose bolus 10% 250 mL, 250 mL, IntraVENous, PRN  glucagon (rDNA) injection 1 mg, 1 mg, SubCUTAneous, PRN  dextrose 10 % infusion, , IntraVENous, Continuous PRN  insulin lispro (HUMALOG) injection vial 0-8 Units, 0-8 Units, SubCUTAneous, TID WC  insulin lispro (HUMALOG) injection vial 0-4 Units, 0-4 Units, SubCUTAneous, Nightly  pantoprazole (PROTONIX) tablet 40 mg, 40 mg, Oral, QAM AC  amLODIPine (NORVASC) tablet 10 mg, 10 mg, Oral, Daily  0.9 % sodium chloride infusion, , IntraVENous, Continuous     Past Medical History:        Diagnosis Date    Abscess of leg     Allergic rhinitis     Asthma     Blood circulation, collateral     numbness in hands and feet    GERD (gastroesophageal reflux disease)     Headache(784.0)     Hyperlipidemia     Hypertension     Neuropathy     Osteoarthritis     Other disorders of kidney and ureter     Pneumonia     Type II or unspecified type diabetes mellitus without mention of complication, not stated as uncontrolled     type 2       Past Surgical History:        Procedure Laterality Date    APPENDECTOMY  5/31/12    lap Jb Bronson    ARM SURGERY      CARDIAC CATHETERIZATION  2007    WNL, no stents     COLONOSCOPY Left 2/26/2018    COLONOSCOPY POLYPECTOMY HOT BIOPSY performed by He Pham MD at [facility]    FOOT SURGERY Right 2008    Rt leg    HAND SURGERY Left 2010    VASCULAR SURGERY      pinched nerve left arm       Allergies: Cephalexin, Januvia [sitagliptin], and Quinapril hcl      Social History:  Patient is born and raised in [facility] area. Currently disabled.  for last 22 years. Reports that he has 10 children.     SUBSTANCE USE HISTORY: none    Family Medical and Psychiatric History: Problem Relation Age of Onset    High Blood Pressure Mother     Heart Disease Father     Cancer Maternal Aunt         LIVER    Colon Polyps Neg Hx        Physical  BP (!) 163/97   Pulse 76   Temp 97.8 °F (36.6 °C) (Axillary)   Resp 16   Ht 5' 8\" (1.727 m)   Wt 150 lb (68 kg)   SpO2 94%   BMI 22.81 kg/m²     Mental Status Examination:  Level of consciousness:  Within normal limits  Appearance: hospital attire, lying in bed, fair grooming  Behavior/Motor:  no abnormalities noted  Attitude toward examiner:  cooperative, attentive and good eye contact  Speech:  Spontaneous, normal rate and volume  Mood: Depressed  Affect: mood congruent  Thought processes:  Linear, goal directed, coherent  Thought content: Denies any suicidal ideations   Denies homicidal ideations    Denies hallucinations   Denies delusions  Cognition:  Oriented to self, situation, location, date  Concentration clinically adequate  Memory age appropriate  Insight & Judgment:  fair    DSM-5 DIAGNOSIS:  Depression with suicidal ideation    Stressors     Severity of stressors is moderate  Source of stressors include: Other psychosocial and environmental stressors    PLAN:    Can discontinue 1:1 sitter and discharge home after he is medically stable  Will start Lexapro 5mg daily to help with mood  Will follow as needed      Thank you very much for allowing us to participate in the care of this patient.       Electronically signed by Marcos Pyle MD on 1/15/23 at 12:45 PM EST

## 2023-01-15 NOTE — PLAN OF CARE
Problem: Pain  Goal: Verbalizes/displays adequate comfort level or baseline comfort level  Outcome: Progressing  Flowsheets (Taken 1/15/2023 8718)  Verbalizes/displays adequate comfort level or baseline comfort level:   Encourage patient to monitor pain and request assistance   Assess pain using appropriate pain scale   Administer analgesics based on type and severity of pain and evaluate response   Implement non-pharmacological measures as appropriate and evaluate response  Pain Assessment: None - Denies Pain  Pain Level: 0       Is pain goal met at this time? Yes        Problem: Depression/Self Harm  Goal: Effect of psychiatric condition will be minimized and patient will be protected from self harm  Description: INTERVENTIONS:  1. Assess impact of patient's symptoms on level of functioning, self care needs and offer support as indicated  2. Assess patient/family knowledge of depression, impact on illness and need for teaching  3. Provide emotional support, presence and reassurance  4. Assess for possible suicidal thoughts or ideation. If patient expresses suicidal thoughts or statements do not leave alone, initiate Suicide Precautions, move to a room close to the nursing station and obtain sitter  5. Initiate consults as appropriate with Mental Health Professional, Spiritual Care, Psychosocial CNS, and consider a recommendation to the LIP for a Psychiatric Consultation  Outcome: Progressing   Patient denies thoughts of self harm. Psych cleared patient to not have sitter at bedside. Problem: Discharge Planning  Goal: Discharge to home or other facility with appropriate resources  Outcome: Progressing   Discharge plan is home. Problem: Metabolic/Fluid and Electrolytes - Adult  Goal: Hemodynamic stability and optimal renal function maintained  Outcome: Progressing   Continuing to monitor labs and administer IV fluids per order. Care plan reviewed with patient.  Patient verbalize understanding of the plan of care and contribute to goal setting.

## 2023-01-15 NOTE — ED PROVIDER NOTES
[facility] [address] [address] EMERGENCY DEPT      Pt Name: Fermin Ferreira  MRN: [id number]  [place] 1965  Date of evaluation: 1/15/2023  Provider: Diane Bradford PA-C    CHIEF COMPLAINT       Chief Complaint   Patient presents with    Suicidal       Nurses Notes reviewed and I agree except as noted in the HPI. HISTORY OF PRESENT ILLNESS    Fermin Ferreira is a 65 y.o. male who presents by [facility] from home for mental evaluation. LPD placed an [facility] order after son called the police because patient has been making suicidal comments. Patient found out that his wife of 22 years cheated on him. Patient has been depressed the last few days and feels suicidal with a plan to overdose on his pills. [facility] wrote an [facility] order where patient's wife reported he has overdosed previously. Patient however denies prior attempts. Patient denies previous psychiatric history and takes no psychiatric medications. Patient does not feel he needs admitted as he has a family member who is at the end of his life. Patient smokes cigars daily but denies alcohol or illicit drug use. REVIEW OF SYSTEMS     Review of Systems   Constitutional:  Negative for appetite change and fever. HENT:  Negative for congestion and rhinorrhea. Eyes:  Negative for visual disturbance. Respiratory:  Negative for cough and shortness of breath. Cardiovascular:  Negative for chest pain. Gastrointestinal:  Negative for abdominal pain, nausea and vomiting. Genitourinary:  Negative for decreased urine volume. Musculoskeletal:  Negative for gait problem. Skin:  Negative for rash and wound. Neurological:  Negative for headaches. Psychiatric/Behavioral:  Positive for dysphoric mood and suicidal ideas. Negative for hallucinations, self-injury and sleep disturbance.        PAST MEDICAL HISTORY    has a past medical history of Abscess of leg, Allergic rhinitis, Asthma, Blood circulation, collateral, GERD (gastroesophageal reflux disease), Headache(784.0), Hyperlipidemia, Hypertension, Neuropathy, Osteoarthritis, Other disorders of kidney and ureter, Pneumonia, and Type II or unspecified type diabetes mellitus without mention of complication, not stated as uncontrolled. SURGICAL HISTORY      has a past surgical history that includes Foot surgery (Right, 2008); Hand surgery (Left, 2010); Appendectomy (5/31/12); vascular surgery; Cardiac catheterization (2007); Arm Surgery; and Colonoscopy (Left, 2/26/2018). CURRENT MEDICATIONS       Previous Medications    ALBUTEROL SULFATE HFA (PROVENTIL HFA) 108 (90 BASE) MCG/ACT INHALER    Inhale 2 puffs into the lungs every 6 hours as needed for Wheezing    AMLODIPINE (NORVASC) 10 MG TABLET    take 1 tablet by mouth once daily    ASPIRIN 325 MG EC TABLET    Take 1 tablet by mouth daily    BLOOD GLUCOSE MONITOR KIT AND SUPPLIES    Check blood sugar q daily, Dx E11.9    BLOOD GLUCOSE TEST STRIPS (GLUCOSE METER TEST) STRIP    1 each by In Vitro route daily Check blood sugar q daily Dx E11.9    CONTINUOUS BLOOD GLUC  (FREESTYLE YUMI READER) JESU    1 Device by Does not apply route 3 times daily (before meals)    CONTINUOUS BLOOD GLUC SENSOR (FREESTYLE YUMI SENSOR SYSTEM) MISC    1 device every 14 days    CYCLOBENZAPRINE (FLEXERIL) 10 MG TABLET    cyclobenzaprine cyclobenzaprine 10 mg oral tablet 3/16/2018 take 1 tablet (10 mg) by oral route 3 times per day 03-  [facility] ([id number])    DICYCLOMINE (BENTYL) 20 MG TABLET    Take 1 tablet by mouth 4 times daily (before meals and nightly)    DULOXETINE (CYMBALTA) 60 MG EXTENDED RELEASE CAPSULE    take 1 capsule by mouth once daily    FLUTICASONE-SALMETEROL (ADVAIR DISKUS) 250-50 MCG/DOSE AEPB    Inhale 1 puff into the lungs every 12 hours    GABAPENTIN (NEURONTIN) 300 MG CAPSULE    Take 1 capsule by mouth daily for 90 days.     GEMFIBROZIL (LOPID) 600 MG TABLET    TAKE 1 TABLET BY MOUTH TWICE DAILY    GLUCOSE MONITORING KIT (FREESTYLE) MONITORING KIT    1 kit by Does not apply route daily as needed    INSULIN GLARGINE (LANTUS) 100 UNIT/ML INJECTION VIAL    Inject 50 Units into the skin nightly    INSULIN LISPRO (HUMALOG) 100 UNIT/ML INJECTION VIAL    ACHS Sliding scale Blood sugar 0-199  0 units                 200- 249-  2 units                 250-299    4 units                 300-349    6 units                 350-399    8 units    LIDOCAINE (LIDODERM) 5 %    Place 1 patch onto the skin daily 12 hours on, 12 hours off. LISINOPRIL-HYDROCHLOROTHIAZIDE (PRINZIDE;ZESTORETIC) 20-25 MG PER TABLET    Take 1 tablet by mouth daily    LORATADINE (CLARITIN) 10 MG TABLET    loratadine loratadine 10 mg oral tablet take 1 tablet (10 mg) by oral route once daily   [facility] 87 ([id number])    METFORMIN (GLUCOPHAGE) 1000 MG TABLET    take 1 tablet by mouth twice a day with meals    METOPROLOL TARTRATE (LOPRESSOR) 50 MG TABLET    take 1 tablet by mouth twice a day    MISC. DEVICES [facility]) MISC    2 each by Does not apply route daily Anti tip device for wheelchair. ONDANSETRON (ZOFRAN) 4 MG TABLET    Take 1 tablet by mouth 3 times daily as needed for Nausea or Vomiting    PANTOPRAZOLE (PROTONIX) 40 MG TABLET    Take 1 tablet by mouth every morning (before breakfast)    PRAVASTATIN (PRAVACHOL) 40 MG TABLET    Take 1 tablet by mouth daily       ALLERGIES     is allergic to cephalexin, januvia [sitagliptin], and quinapril hcl. FAMILY HISTORY     He indicated that his mother is alive. He indicated that his father is . He indicated that his sister is alive. He indicated that the status of his maternal aunt is unknown. He indicated that the status of his neg hx is unknown.   family history includes Cancer in his maternal aunt; Heart Disease in his father; High Blood Pressure in his mother. SOCIAL HISTORY    reports that he has been smoking cigars and cigarettes. He started smoking about 42 years ago.  He has a 30.00 pack-year smoking history. He has never used smokeless tobacco. He reports that he does not drink alcohol and does not use drugs. PHYSICAL EXAM     INITIAL VITALS:  height is 5' 8\" (1.727 m) and weight is 150 lb (68 kg). His oral temperature is 97.6 °F (36.4 °C). His blood pressure is 186/101 (abnormal) and his pulse is 79. His respiration is 16 and oxygen saturation is 99%. Physical Exam  Vitals and nursing note reviewed. Constitutional:       General: He is not in acute distress. Appearance: He is well-developed. He is not toxic-appearing or diaphoretic. HENT:      Head: Normocephalic and atraumatic. Right Ear: Hearing normal.      Left Ear: Hearing normal.      Nose: Nose normal. No rhinorrhea. Mouth/Throat:      Pharynx: Uvula midline. No oropharyngeal exudate. Eyes:      General: Lids are normal. No scleral icterus. Conjunctiva/sclera: Conjunctivae normal.      Pupils: Pupils are equal, round, and reactive to light. Neck:      Trachea: No tracheal deviation. Cardiovascular:      Rate and Rhythm: Normal rate and regular rhythm. Heart sounds: Normal heart sounds. No murmur heard. Pulmonary:      Effort: Pulmonary effort is normal. No respiratory distress. Breath sounds: Normal breath sounds. No stridor. No decreased breath sounds or wheezing. Abdominal:      General: There is no distension. Palpations: Abdomen is soft. Abdomen is not rigid. Tenderness: There is no abdominal tenderness. There is no guarding. Musculoskeletal:         General: Normal range of motion. Cervical back: Normal range of motion and neck supple. No rigidity. Comments: Right BKA   Lymphadenopathy:      Cervical: No cervical adenopathy. Skin:     General: Skin is warm and dry. Coloration: Skin is not pale. Findings: No rash. Neurological:      Mental Status: He is alert and oriented to person, place, and time. GCS: GCS eye subscore is 4.  GCS verbal subscore is 5. GCS motor subscore is 6. Gait: Gait normal.      Comments: No gross deficits observed   Psychiatric:         Mood and Affect: Mood is depressed. Speech: Speech is delayed. Behavior: Behavior is slowed. Thought Content: Thought content includes suicidal ideation. Thought content includes suicidal plan. DIFFERENTIAL DIAGNOSIS:   Including but not limited to: Suicidal ideation, adjustment disorder, major depressive disorder    Diagnoses Considered but I have low suspicion of:   Ingestion, infection, substance abuse    DIAGNOSTIC RESULTS     EKG: All EKG's are [facility] who either signs or Co-signs this chart in the absence of a cardiologist.  Ventricular rate 82 bpm  WI interval 178 ms  QRS duration 84 ms  QTc interval 429 ms  P-R-T axes 59, 23, 43  Normal sinus rhythm. No STEMI    RADIOLOGY: non-plain film images(s) such as CT,Ultrasound and MRI are read by the radiologist.  Plain radiographic images are visualized and preliminarily interpreted by the emergency physician unless otherwise stated below.   No orders to display       LABS:   Labs Reviewed   CBC WITH AUTO DIFFERENTIAL - Abnormal; Notable for the following components:       Result Value    RBC 3.41 (*)     Hemoglobin 9.7 (*)     Hematocrit 28.1 (*)     All other components within normal limits   COMPREHENSIVE METABOLIC PANEL - Abnormal; Notable for the following components:    Glucose 278 (*)     Creatinine 2.1 (*)     BUN 31 (*)     Chloride 97 (*)     ALT 7 (*)     All other components within normal limits   SALICYLATE LEVEL - Abnormal; Notable for the following components:    Salicylate, Serum < 0.3 (*)     All other components within normal limits   GLOMERULAR FILTRATION RATE, ESTIMATED - Abnormal; Notable for the following components:    Est, Glom Filt Rate 36 (*)     All other components within normal limits   POCT GLUCOSE - Abnormal; Notable for the following components:    POC Glucose 298 (*)     All other components within normal limits   POCT GLUCOSE - Normal   ACETAMINOPHEN LEVEL   ETHANOL   ANION GAP   OSMOLALITY   URINE DRUG SCREEN       EMERGENCY DEPARTMENT COURSE:   Vitals:    Vitals:    01/15/23 0033 01/15/23 0106   BP: (!) 201/106 (!) 186/101   Pulse: 85 79   Resp: 16 16   Temp: 97.6 °F (36.4 °C)    TempSrc: Oral    SpO2: 100% 99%   Weight: 150 lb (68 kg)    Height: 5' 8\" (1.727 m)        MDM:  The patient was seen by me in the emergency room for mental evaluation under [facility] order placed by [facility] after patient made suicidal comments to his son about overdosing on his pills. Physical exam revealed a depressed and slowed 61-year-old gentleman who was cooperative. Vital signs reviewed and noted stable although hypertensive. Old records were reviewed. Appropriate laboratory studies were ordered and reviewed upon completion. Pertinent findings: Hemoglobin 9.7, glucose 278, creatinine 2.1, BUN 31    Interventions: Normal saline, constant one-on-one observation    Reexamination: Patient was sleeping but easily aroused with verbal stimulus. Patient remained stable with good vital signs however he remained hypertensive. Due to patient's BASIL and uncontrolled hypertension patient was not medically cleared for psychiatric admission. I discussed this with Arminda Fenton from behavioral health who advised patient would still be under [facility] hold with constant observation and will have a psych consult on the floor. Results were discussed with the patient as well as desire for admission and they were amenable. Dina Horn PA-C of our hospitalists' service was consulted and graciously accepted admission. The patient was admitted to the hospital in fair condition. CRITICAL CARE:   None    CONSULTS:  Arminda Fenton ([facility])  Dina Horn PA-C (hospitalist)    PROCEDURES:  None    FINAL IMPRESSION      1. Depression with suicidal ideation    2.  BASIL (acute kidney injury) ([facility] [address] [address] 3. Uncontrolled type 2 diabetes mellitus with hyperglycemia (HCC)    4. Elevated blood pressure reading with diagnosis of hypertension    5. Uncontrolled hypertension          DISPOSITION/PLAN     1. Depression with suicidal ideation    2. BASIL (acute kidney injury) ([facility]    3. Uncontrolled type 2 diabetes mellitus with hyperglycemia (HCC)    4. Elevated blood pressure reading with diagnosis of hypertension    5.  Uncontrolled hypertension    Admission      (Please note that portions of this note were completed with a voice recognition program.  Efforts were made to edit the dictations but occasionally words are mis-transcribed.)    Daryle Perone, PA-C 01/15/23 3:12 AM    Daryle Perone, PA-C Daryle Perone, PA-C  01/15/23 [id number]

## 2023-01-15 NOTE — PROGRESS NOTES
Chief Complaint:   Suicidal       Provisional Diagnosis: Unspecified Depressive Disorder      Risk, Psychosocial and Contextual Factors: (homeless, lack of social support etc.): Separation from wife, loss of employment, financial, medical      Current  Treatment: Denies        Present Suicidal Behavior:    Verbal: 'I said it I should not have'    Attempt:Denies      Access to Weapons: Denies      C-SSRS Current Suicide Risk: Low, Moderate or High:  Moderate        Past Suicidal Behavior:    Verbal: xxx    Attempts: Denies      Self-Injurious/Self-Mutilation: (Specify) Denies      Traumatic Event Within Past 2 Weeks: (Specify) Father in law may pass 'anyday'. Current Abuse:  (Specify) Denies      Legal: (Specify) Denies      Violence: (Specify) Denies      Protective Factors:  Son      Housing: Resides with son      CPAP/Oxygen/Ambulation Difficulties: Patient had his leg amputated below the knee, Patient reports he is able to walk at this time. Basic Vital Signs:      Critical Labs:      Risk Factors: Age, no provider for mental health symptoms, financial, medical, separation from wife. Clinical Summary:    Patient is a [de-identified] year old male escorted to [address] [place] [address] by Surendra Aiken under [facility] status. Per [facility] written by Carissa Freedman : Officers were sent to  [facility] . Upon speaking to Yaakov's son he advised Cielo Alatorre has been making threats to kill himself. He has stated he wanted to end it all' 'and do so by taking a bunch of pills. Yaakov's wife had said he has taken pills before. Cielo Alatorre appeared to be depressed speaking slowly and avoiding eye contact. Appeared to have a gloomy outlook on his current situation''    Patient reports his wife of twenty two years left him when his leg was amputated eight months ago. He had learned at that time she was seeing another male. They had since reconciled this issue but remained in separate homes. Patient reports he felt the relationship was improving.  He went to visit his father in law who is ill. His wife was there with the man she was seeing eight months ago. Patient reports 'I was hurt' very hurt'. Patient reports they argued on the phone this evening and he made the statement to 'take pills'. Patient reports he was very hurt earlier in the week and made the same statement . Patient reports he was angry and hurt but was not able to take the pills. 'I just couldn't do it'. Patient applied for disability but was denied. He is having financial difficulty. Patient resides in stable housing with his son. Patient does not have a mental health provider. Patients affect is flat with monotone voice. Patient denied delusions/hallucinations. Patient is cooperative with the interview. Level of Care Disposition:      Consulted with Jacek Meraz PA-C concerning the mental status of patient. Patient will be a medical admit with a psyche consult. 02:40 Waiting medical clearance.

## 2023-01-15 NOTE — ED NOTES
Pt transported to [phone] via chair in stable condition under police escort. Floor called prior to transport.      Dayan Willoughby  01/15/23 5976

## 2023-01-16 ENCOUNTER — APPOINTMENT (OUTPATIENT)
Dept: ULTRASOUND IMAGING | Age: 58
DRG: 469 | End: 2023-01-16
Payer: COMMERCIAL

## 2023-01-16 LAB
ANION GAP SERPL CALCULATED.3IONS-SCNC: 10 MEQ/L (ref 8–16)
BUN BLDV-MCNC: 26 MG/DL (ref 7–22)
CALCIUM SERPL-MCNC: 8.8 MG/DL (ref 8.5–10.5)
CHLORIDE BLD-SCNC: 105 MEQ/L (ref 98–111)
CO2: 24 MEQ/L (ref 23–33)
CREAT SERPL-MCNC: 1.9 MG/DL (ref 0.4–1.2)
CREATININE URINE: 45.9 MG/DL
EKG ATRIAL RATE: 82 BPM
EKG P AXIS: 59 DEGREES
EKG P-R INTERVAL: 178 MS
EKG Q-T INTERVAL: 368 MS
EKG QRS DURATION: 84 MS
EKG QTC CALCULATION (BAZETT): 429 MS
EKG R AXIS: 23 DEGREES
EKG T AXIS: 43 DEGREES
EKG VENTRICULAR RATE: 82 BPM
GFR SERPL CREATININE-BSD FRML MDRD: 41 ML/MIN/1.73M2
GLUCOSE BLD-MCNC: 136 MG/DL (ref 70–108)
GLUCOSE BLD-MCNC: 213 MG/DL (ref 70–108)
GLUCOSE BLD-MCNC: 268 MG/DL (ref 70–108)
MAGNESIUM: 1.5 MG/DL (ref 1.6–2.4)
OSMOLALITY URINE: 343 MOSMOL/KG (ref 250–750)
POTASSIUM REFLEX MAGNESIUM: 3.3 MEQ/L (ref 3.5–5.2)
SODIUM BLD-SCNC: 139 MEQ/L (ref 135–145)
SODIUM URINE: 104 MEQ/L

## 2023-01-16 PROCEDURE — 6360000002 HC RX W HCPCS: Performed by: STUDENT IN AN ORGANIZED HEALTH CARE EDUCATION/TRAINING PROGRAM

## 2023-01-16 PROCEDURE — 6360000002 HC RX W HCPCS: Performed by: PHYSICIAN ASSISTANT

## 2023-01-16 PROCEDURE — 82948 REAGENT STRIP/BLOOD GLUCOSE: CPT

## 2023-01-16 PROCEDURE — 76770 US EXAM ABDO BACK WALL COMP: CPT

## 2023-01-16 PROCEDURE — 83935 ASSAY OF URINE OSMOLALITY: CPT

## 2023-01-16 PROCEDURE — 2580000003 HC RX 258: Performed by: INTERNAL MEDICINE

## 2023-01-16 PROCEDURE — 1200000000 HC SEMI PRIVATE

## 2023-01-16 PROCEDURE — 6370000000 HC RX 637 (ALT 250 FOR IP): Performed by: INTERNAL MEDICINE

## 2023-01-16 PROCEDURE — 80048 BASIC METABOLIC PNL TOTAL CA: CPT

## 2023-01-16 PROCEDURE — 36415 COLL VENOUS BLD VENIPUNCTURE: CPT

## 2023-01-16 PROCEDURE — 83735 ASSAY OF MAGNESIUM: CPT

## 2023-01-16 PROCEDURE — 6370000000 HC RX 637 (ALT 250 FOR IP): Performed by: STUDENT IN AN ORGANIZED HEALTH CARE EDUCATION/TRAINING PROGRAM

## 2023-01-16 PROCEDURE — 82570 ASSAY OF URINE CREATININE: CPT

## 2023-01-16 PROCEDURE — 84300 ASSAY OF URINE SODIUM: CPT

## 2023-01-16 RX ORDER — LANOLIN ALCOHOL/MO/W.PET/CERES
400 CREAM (GRAM) TOPICAL DAILY
Status: DISCONTINUED | OUTPATIENT
Start: 2023-01-16 | End: 2023-01-17 | Stop reason: HOSPADM

## 2023-01-16 RX ORDER — GABAPENTIN 300 MG/1
300 CAPSULE ORAL 2 TIMES DAILY
Status: DISCONTINUED | OUTPATIENT
Start: 2023-01-16 | End: 2023-01-17 | Stop reason: HOSPADM

## 2023-01-16 RX ORDER — METOPROLOL TARTRATE 50 MG/1
50 TABLET, FILM COATED ORAL 2 TIMES DAILY
Status: DISCONTINUED | OUTPATIENT
Start: 2023-01-16 | End: 2023-01-17 | Stop reason: HOSPADM

## 2023-01-16 RX ORDER — POTASSIUM CHLORIDE 20 MEQ/1
20 TABLET, EXTENDED RELEASE ORAL 2 TIMES DAILY WITH MEALS
Status: DISCONTINUED | OUTPATIENT
Start: 2023-01-16 | End: 2023-01-17 | Stop reason: HOSPADM

## 2023-01-16 RX ORDER — INSULIN GLARGINE 100 [IU]/ML
30 INJECTION, SOLUTION SUBCUTANEOUS NIGHTLY
Status: DISCONTINUED | OUTPATIENT
Start: 2023-01-16 | End: 2023-01-17 | Stop reason: HOSPADM

## 2023-01-16 RX ORDER — DULOXETIN HYDROCHLORIDE 60 MG/1
60 CAPSULE, DELAYED RELEASE ORAL DAILY
Status: DISCONTINUED | OUTPATIENT
Start: 2023-01-16 | End: 2023-01-17 | Stop reason: HOSPADM

## 2023-01-16 RX ORDER — ESCITALOPRAM OXALATE 10 MG/1
5 TABLET ORAL DAILY
Status: DISCONTINUED | OUTPATIENT
Start: 2023-01-16 | End: 2023-01-17 | Stop reason: HOSPADM

## 2023-01-16 RX ORDER — PRAVASTATIN SODIUM 40 MG
40 TABLET ORAL NIGHTLY
Status: DISCONTINUED | OUTPATIENT
Start: 2023-01-16 | End: 2023-01-17 | Stop reason: HOSPADM

## 2023-01-16 RX ADMIN — INSULIN LISPRO 2 UNITS: 100 INJECTION, SOLUTION INTRAVENOUS; SUBCUTANEOUS at 17:43

## 2023-01-16 RX ADMIN — METOPROLOL TARTRATE 50 MG: 50 TABLET, FILM COATED ORAL at 12:36

## 2023-01-16 RX ADMIN — POTASSIUM CHLORIDE 20 MEQ: 1500 TABLET, EXTENDED RELEASE ORAL at 12:37

## 2023-01-16 RX ADMIN — INSULIN GLARGINE 30 UNITS: 100 INJECTION, SOLUTION SUBCUTANEOUS at 20:53

## 2023-01-16 RX ADMIN — ENOXAPARIN SODIUM 40 MG: 100 INJECTION SUBCUTANEOUS at 08:41

## 2023-01-16 RX ADMIN — DULOXETINE HYDROCHLORIDE 60 MG: 60 CAPSULE, DELAYED RELEASE ORAL at 12:34

## 2023-01-16 RX ADMIN — POTASSIUM CHLORIDE 20 MEQ: 1500 TABLET, EXTENDED RELEASE ORAL at 17:43

## 2023-01-16 RX ADMIN — HYDRALAZINE HYDROCHLORIDE 10 MG: 20 INJECTION INTRAMUSCULAR; INTRAVENOUS at 00:09

## 2023-01-16 RX ADMIN — AMLODIPINE BESYLATE 10 MG: 10 TABLET ORAL at 08:38

## 2023-01-16 RX ADMIN — SODIUM CHLORIDE: 9 INJECTION, SOLUTION INTRAVENOUS at 19:17

## 2023-01-16 RX ADMIN — GABAPENTIN 300 MG: 300 CAPSULE ORAL at 12:34

## 2023-01-16 RX ADMIN — METOPROLOL TARTRATE 50 MG: 50 TABLET, FILM COATED ORAL at 20:51

## 2023-01-16 RX ADMIN — PANTOPRAZOLE SODIUM 40 MG: 40 TABLET, DELAYED RELEASE ORAL at 06:11

## 2023-01-16 RX ADMIN — ESCITALOPRAM OXALATE 5 MG: 10 TABLET ORAL at 12:34

## 2023-01-16 RX ADMIN — Medication 400 MG: at 12:33

## 2023-01-16 RX ADMIN — GABAPENTIN 300 MG: 300 CAPSULE ORAL at 20:51

## 2023-01-16 RX ADMIN — PRAVASTATIN SODIUM 40 MG: 40 TABLET ORAL at 20:51

## 2023-01-16 ASSESSMENT — PAIN SCALES - GENERAL: PAINLEVEL_OUTOF10: 0

## 2023-01-16 NOTE — PROGRESS NOTES
Hospitalist Progress Note    Patient:  Ceci Allred      Unit/Bed:5K-04/004-A    YOB: 1965    MRN: [id number]       Acct: [de-identified]     PCP: Farida Oliveira DO    Date of Admission: 1/15/2023    Active Hospital Problems    Diagnosis Date Noted    BASIL (acute kidney injury) [facility]) [N17.9] 01/15/2023     Priority: Medium    Depression with suicidal ideation [F32. A, R45.851] 01/15/2023     Priority: Medium     ASSESSMENT / PLAN:    # Suicidal ideation - Patient told family he was going to \"take pills\" and kill himself. [employer] was contacted and he was placed under [facility] and transported to [facility]. He denies prior attempts or threats however per chart family has stated otherwise. No family at bedside to confirm. Psychiatry consulted, cleared him. Discontinued suicidal precautions. Started on Lexapro 5 mg qd. # BASIL - Cr=2.1 on baseline of ~1 with worsening eGFR over last 2 years. Cr down to 1.9 with IVF hydration. Will get urine studies and renal ultrasound today. Cont to avoid nephrotoxic agents. Strict I/Os. Daily weights. # Hypokalemia, Hypomagnesemia: Low K+ and Mg levels, started on oral replacement with Klor and MgOx today. Cont to trend daily      # IDDM2 - Uncontrolled. A1c=7.4% (8/5/22). Follows with Dr. Carlos Pham (PCP). Resume Lantus and SSI. hypoglycemia protocol, POCT glucose 4x daily AC/HS     # HTN - Uncontrolled. Patient takes Amlodipine, Lisinopril-HCTZ, Metoprolol per chart review. Will resume all except for ACE-HCTZ given BASIL. 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 Reports surgery was at [facility] approximately 6-8 months ago. # Medication reconciliation - Patient is uncertain of home medications. Nursing/pharmacy to assist    Dispo: Cr still up, will work-up and get urine studies and renal ultrasound today. Cont IVF. Start K+ and Mg replacement today. Chief Complaint: suicidal ideation     Hospital Course: Lauryn Lee is a 65 y.o. male with PMHx of NIDDM2 ,HTN, HLD, GERD and CKD who presented to [facility] with threat of suicidal ideation. Was transported to [facility] via [employer] and [facility] placed. Patient lives with his son and stated that he wanted to \"end it all\" and overdose on his home pills after finding out his wife whom he was trying to reconcile with was cheating on him. He stated he has had no prior attempts and stated his comments were made while in an upset/desperate state but would not actually follow through with his plan. He denies access to firearms, current drug abuse or illicit drug use. He admits to being under a lot of financial stress lately as well as dealing with illnesses in his family. He recently applied for disability but was denied and that is also adding a large amount of stress to his situation. The patient is a poor historian of past medical history and had a difficult time recalling his home medications, dosages, and recent below knee amputation on the right. He denies fever, chills, headache, lightheadedness, change in vision, rhinorrhea, congestion, sore throat, cough, hemoptysis, chest pain, palpitations, GRADINER, PND, shortness of breath, abdominal pain, nausea, vomiting, hematemesis, change in bowel/bladder habits, hematochezia, hematuria, weakness, difficulty with ambulation, numbness/tingling, or known exposure to sick contacts. Subjective: no acute events overnight. Pt reports feeling fine, no suicidal ideations. Tolerating PO intake. No fevers or chills. No chest pain or sob.        Medications:  Reviewed    Infusion Medications    sodium chloride      dextrose      sodium chloride 125 mL/hr at 01/15/23 3363     Scheduled Medications    gabapentin  300 mg Oral BID    escitalopram  5 mg Oral Daily    insulin glargine  30 Units SubCUTAneous Nightly    metoprolol tartrate  50 mg Oral BID pravastatin  40 mg Oral Nightly    DULoxetine  60 mg Oral Daily    magnesium oxide  400 mg Oral Daily    potassium chloride  20 mEq Oral BID WC    sodium chloride flush  5-40 mL IntraVENous 2 times per day    enoxaparin  40 mg SubCUTAneous Daily    insulin lispro  0-8 Units SubCUTAneous TID WC    insulin lispro  0-4 Units SubCUTAneous Nightly    pantoprazole  40 mg Oral QAM AC    amLODIPine  10 mg Oral Daily     PRN Meds: sodium chloride flush, sodium chloride, ondansetron **OR** ondansetron, polyethylene glycol, acetaminophen **OR** acetaminophen, glucose, dextrose bolus **OR** dextrose bolus, glucagon (rDNA), dextrose, hydrALAZINE      Intake/Output Summary (Last 24 hours) at 1/16/2023 1017  Last data filed at 1/15/2023 1351  Gross per 24 hour   Intake 120 ml   Output --   Net 120 ml       Diet:  ADULT DIET; Regular; 4 carb choices (60 gm/meal); Safety Tray; Safety Tray (Disposables)    Exam:  BP (!) 188/91   Pulse 94   Temp 97.5 °F (36.4 °C) (Axillary)   Resp 16   Ht 5' 8\" (1.727 m)   Wt 150 lb (68 kg)   SpO2 98%   BMI 22.81 kg/m²     General appearance: No apparent distress, appears stated age and cooperative. HEENT: Pupils equal, round, and reactive to light. Conjunctivae/corneas clear. Neck: Supple, with full range of motion. No jugular venous distention. Trachea midline. Respiratory:  Normal respiratory effort. Clear to auscultation, bilaterally without Rales/Wheezes/Rhonchi. Cardiovascular: Regular rate and rhythm with normal S1/S2 without murmurs, rubs or gallops. Abdomen: Soft, non-tender, non-distended with normal bowel sounds. Musculoskeletal: passive and active ROM x 4 extremities. Skin: Skin color, texture, turgor normal.  No rashes or lesions. Neurologic:  Neurovascularly intact without any focal sensory/motor deficits.  Cranial nerves: II-XII intact, grossly non-focal.  Psychiatric: Alert and oriented, thought content appropriate, normal insight  Capillary Refill: Brisk,< 3 seconds Peripheral Pulses: +2 palpable, equal bilaterally       Labs:   Recent Labs     01/15/23  0053   WBC 10.6   HGB 9.7*   HCT 28.1*        Recent Labs     01/16/23  0544      K 3.3*      CO2 24   BUN 26*   CREATININE 1.9*   CALCIUM 8.8     Recent Labs     01/15/23  0054   AST 12   ALT 7*   BILITOT 0.3   ALKPHOS 105     No results for input(s): INR in the last 72 hours. No results for input(s): Connee Matar in the last 72 hours. Urinalysis:      Lab Results   Component Value Date/Time    NITRU NEGATIVE 01/26/2020 02:19 PM    WBCUA 0-2 01/26/2020 02:19 PM    WBCUA 0-5 01/25/2017 12:02 AM    BACTERIA NONE SEEN 01/26/2020 02:19 PM    RBCUA 0-2 01/26/2020 02:19 PM    BLOODU NEGATIVE 01/26/2020 02:19 PM    SPECGRAV >1.030 01/26/2020 02:19 PM    GLUCOSEU >= 1000 07/10/2017 02:10 PM       Radiology: All imaging reviewed     Diet: ADULT DIET; Regular; 4 carb choices (60 gm/meal);  Safety Tray; Safety Tray (Disposables)      Code Status: Full Code            Electronically signed by Mee Scott MD on 1/16/2023 at 10:17 AM

## 2023-01-16 NOTE — PLAN OF CARE
Problem: Pain  Goal: Verbalizes/displays adequate comfort level or baseline comfort level  1/16/2023 0202 by Miguelito Boyd RN  Outcome: Progressing  Flowsheets (Taken 1/16/2023 0202)  Verbalizes/displays adequate comfort level or baseline comfort level:   Encourage patient to monitor pain and request assistance   Assess pain using appropriate pain scale   Administer analgesics based on type and severity of pain and evaluate response   Implement non-pharmacological measures as appropriate and evaluate response   Consider cultural and social influences on pain and pain management   Notify Licensed Independent Practitioner if interventions unsuccessful or patient reports new pain  1/15/2023 1357 by Ronnie Cui RN  Outcome: Progressing  Flowsheets (Taken 1/15/2023 0835)  Verbalizes/displays adequate comfort level or baseline comfort level:   Encourage patient to monitor pain and request assistance   Assess pain using appropriate pain scale   Administer analgesics based on type and severity of pain and evaluate response   Implement non-pharmacological measures as appropriate and evaluate response     Problem: Depression/Self Harm  Goal: Effect of psychiatric condition will be minimized and patient will be protected from self harm  Description: INTERVENTIONS:  1. Assess impact of patient's symptoms on level of functioning, self care needs and offer support as indicated  2. Assess patient/family knowledge of depression, impact on illness and need for teaching  3. Provide emotional support, presence and reassurance  4. Assess for possible suicidal thoughts or ideation. If patient expresses suicidal thoughts or statements do not leave alone, initiate Suicide Precautions, move to a room close to the nursing station and obtain sitter  5.  Initiate consults as appropriate with Mental Health Professional, Spiritual Care, Psychosocial CNS, and consider a recommendation to the LIP for a Psychiatric Consultation  1/16/2023 0202 by Alexsandra Chavarria RN  Outcome: Progressing  Flowsheets (Taken 1/16/2023 0202)  Effect of psychiatric condition will be minimized and patient will be protected from self harm:   Assess impact of patients symptoms on level of functioning, self care needs and offer support as indicated   Assess patient/family knowledge of depression, impact on illness and need for teaching   Provide emotional support, presence and reassurance   Assess for suicidal thoughts or ideation.  If patient expresses suicidal thoughts or statements do not leave alone, initiate Suicide Precautions, move near nurse station, obtain sitter   Initiate consults as appropriate with Mental Health Professional, Spiritual Care, Psychosocial CNS, and consider a recommendation to the LIP for a Psychiatric Consultation  1/15/2023 1357 by Juliet Mejía RN  Outcome: Progressing     Problem: Discharge Planning  Goal: Discharge to home or other facility with appropriate resources  1/16/2023 0202 by Alexsandra Chavarria RN  Outcome: Progressing  Flowsheets (Taken 1/16/2023 0202)  Discharge to home or other facility with appropriate resources:   Identify barriers to discharge with patient and caregiver   Arrange for needed discharge resources and transportation as appropriate  1/15/2023 1357 by Juliet Mejía RN  Outcome: Progressing     Problem: Metabolic/Fluid and Electrolytes - Adult  Goal: Hemodynamic stability and optimal renal function maintained  1/16/2023 0202 by Alexsandra Chavarria RN  Outcome: Progressing  Flowsheets (Taken 1/16/2023 0202)  Hemodynamic stability and optimal renal function maintained:   Monitor labs and assess for signs and symptoms of volume excess or deficit   Monitor intake, output and patient weight   Monitor urine specific gravity, serum osmolarity and serum sodium as indicated or ordered   Monitor response to interventions for patient's volume status, including labs, urine output, blood pressure (other measures as available) Encourage oral intake as appropriate   Instruct patient on fluid and nutrition restrictions as appropriate  1/15/2023 1357 by Mary Pyle RN  Outcome: Progressing       Care plan reviewed with patient. Patient verbalize understanding of the plan of care and contribute to goal setting.

## 2023-01-16 NOTE — PLAN OF CARE
Problem: Pain  Goal: Verbalizes/displays adequate comfort level or baseline comfort level  Outcome: Progressing   Pain Assessment: None - Denies Pain  Pain Level: 0       Is pain goal met at this time?  Yes        Problem: Depression/Self Harm  Goal: Effect of psychiatric condition will be minimized and patient will be protected from self harm  Description: INTERVENTIONS:  1. Assess impact of patient's symptoms on level of functioning, self care needs and offer support as indicated  2. Assess patient/family knowledge of depression, impact on illness and need for teaching  3. Provide emotional support, presence and reassurance  4. Assess for possible suicidal thoughts or ideation. If patient expresses suicidal thoughts or statements do not leave alone, initiate Suicide Precautions, move to a room close to the nursing station and obtain sitter  5. Initiate consults as appropriate with Mental Health Professional, Spiritual Care, Psychosocial CNS, and consider a recommendation to the LIP for a Psychiatric Consultation  Outcome: Progressing  Flowsheets (Taken 1/16/2023 0836)  Effect of psychiatric condition will be minimized and patient will be protected from self harm: Assess impact of patient’s symptoms on level of functioning, self care needs and offer support as indicated   Patient denies thoughts and feelings of self harm.    Problem: Discharge Planning  Goal: Discharge to home or other facility with appropriate resources  Outcome: Progressing  Flowsheets (Taken 1/16/2023 0836)  Discharge to home or other facility with appropriate resources:   Identify barriers to discharge with patient and caregiver   Arrange for needed discharge resources and transportation as appropriate   Identify discharge learning needs (meds, wound care, etc)   Discharge plan is home with son.    Problem: Metabolic/Fluid and Electrolytes - Adult  Goal: Hemodynamic stability and optimal renal function maintained  Outcome:  Progressing  Flowsheets (Taken 1/16/2023 0836)  Hemodynamic stability and optimal renal function maintained: Monitor labs and assess for signs and symptoms of volume excess or deficit   IV fluids administered per order and monitoring labs. Problem: Chronic Conditions and Co-morbidities  Goal: Patient's chronic conditions and co-morbidity symptoms are monitored and maintained or improved  Outcome: Progressing  Flowsheets (Taken 1/16/2023 0836)  Care Plan - Patient's Chronic Conditions and Co-Morbidity Symptoms are Monitored and Maintained or Improved: Monitor and assess patient's chronic conditions and comorbid symptoms for stability, deterioration, or improvement     Care plan reviewed with patient and family. Patient and family verbalize understanding of the plan of care and contribute to goal setting.

## 2023-01-16 NOTE — PROGRESS NOTES
2002 messaged provider about pt elevated BP and restarting pt gabapentin per request.     2006 message read    2028 still waiting response. 2056 orders for hydralazine placed.  Gabapentin held for safety concern

## 2023-01-17 VITALS
HEIGHT: 68 IN | WEIGHT: 150 LBS | DIASTOLIC BLOOD PRESSURE: 85 MMHG | TEMPERATURE: 98.3 F | HEART RATE: 76 BPM | SYSTOLIC BLOOD PRESSURE: 168 MMHG | OXYGEN SATURATION: 97 % | BODY MASS INDEX: 22.73 KG/M2 | RESPIRATION RATE: 16 BRPM

## 2023-01-17 LAB
ANION GAP SERPL CALCULATED.3IONS-SCNC: 13 MEQ/L (ref 8–16)
BUN BLDV-MCNC: 22 MG/DL (ref 7–22)
CALCIUM SERPL-MCNC: 8.5 MG/DL (ref 8.5–10.5)
CHLORIDE BLD-SCNC: 106 MEQ/L (ref 98–111)
CO2: 23 MEQ/L (ref 23–33)
CREAT SERPL-MCNC: 1.9 MG/DL (ref 0.4–1.2)
GFR SERPL CREATININE-BSD FRML MDRD: 41 ML/MIN/1.73M2
GLUCOSE BLD-MCNC: 105 MG/DL (ref 70–108)
GLUCOSE BLD-MCNC: 114 MG/DL (ref 70–108)
GLUCOSE BLD-MCNC: 85 MG/DL (ref 70–108)
POTASSIUM REFLEX MAGNESIUM: 3.9 MEQ/L (ref 3.5–5.2)
SODIUM BLD-SCNC: 142 MEQ/L (ref 135–145)

## 2023-01-17 PROCEDURE — 6370000000 HC RX 637 (ALT 250 FOR IP): Performed by: STUDENT IN AN ORGANIZED HEALTH CARE EDUCATION/TRAINING PROGRAM

## 2023-01-17 PROCEDURE — 36415 COLL VENOUS BLD VENIPUNCTURE: CPT

## 2023-01-17 PROCEDURE — 2580000003 HC RX 258: Performed by: STUDENT IN AN ORGANIZED HEALTH CARE EDUCATION/TRAINING PROGRAM

## 2023-01-17 PROCEDURE — 82948 REAGENT STRIP/BLOOD GLUCOSE: CPT

## 2023-01-17 PROCEDURE — 80048 BASIC METABOLIC PNL TOTAL CA: CPT

## 2023-01-17 PROCEDURE — 6360000002 HC RX W HCPCS: Performed by: STUDENT IN AN ORGANIZED HEALTH CARE EDUCATION/TRAINING PROGRAM

## 2023-01-17 PROCEDURE — 6370000000 HC RX 637 (ALT 250 FOR IP): Performed by: INTERNAL MEDICINE

## 2023-01-17 RX ORDER — INSULIN GLARGINE 100 [IU]/ML
30 INJECTION, SOLUTION SUBCUTANEOUS NIGHTLY
Qty: 4 EACH | Refills: 0 | Status: SHIPPED
Start: 2023-01-17

## 2023-01-17 RX ORDER — GABAPENTIN 300 MG/1
300 CAPSULE ORAL 2 TIMES DAILY
Qty: 10 CAPSULE | Refills: 0 | Status: SHIPPED
Start: 2023-01-17 | End: 2023-01-22

## 2023-01-17 RX ORDER — HYDRALAZINE HYDROCHLORIDE 25 MG/1
25 TABLET, FILM COATED ORAL 2 TIMES DAILY
Qty: 60 TABLET | Refills: 0 | Status: SHIPPED | OUTPATIENT
Start: 2023-01-17

## 2023-01-17 RX ADMIN — SODIUM CHLORIDE, PRESERVATIVE FREE 10 ML: 5 INJECTION INTRAVENOUS at 09:47

## 2023-01-17 RX ADMIN — GABAPENTIN 300 MG: 300 CAPSULE ORAL at 09:47

## 2023-01-17 RX ADMIN — ENOXAPARIN SODIUM 40 MG: 100 INJECTION SUBCUTANEOUS at 09:50

## 2023-01-17 RX ADMIN — Medication 400 MG: at 09:47

## 2023-01-17 RX ADMIN — DULOXETINE HYDROCHLORIDE 60 MG: 60 CAPSULE, DELAYED RELEASE ORAL at 09:47

## 2023-01-17 RX ADMIN — ESCITALOPRAM OXALATE 5 MG: 10 TABLET ORAL at 09:47

## 2023-01-17 RX ADMIN — METOPROLOL TARTRATE 50 MG: 50 TABLET, FILM COATED ORAL at 09:47

## 2023-01-17 RX ADMIN — AMLODIPINE BESYLATE 10 MG: 10 TABLET ORAL at 09:47

## 2023-01-17 RX ADMIN — POTASSIUM CHLORIDE 20 MEQ: 1500 TABLET, EXTENDED RELEASE ORAL at 09:47

## 2023-01-17 RX ADMIN — PANTOPRAZOLE SODIUM 40 MG: 40 TABLET, DELAYED RELEASE ORAL at 05:26

## 2023-01-17 NOTE — CARE COORDINATION

## 2023-01-17 NOTE — PROGRESS NOTES
01/17/23 1005   Encounter Summary   Encounter Overview/Reason  Spiritual/Emotional Needs   Service Provided For: Patient   Referral/Consult From: Rounding   Last Encounter  01/17/23   Complexity of Encounter Moderate   Begin Time 0955   End Time  1005   Total Time Calculated 10 min   Encounter    Type Initial Screen/Assessment   Spiritual/Emotional needs   Type Spiritual Support   Assessment/Intervention/Outcome   Assessment Calm   Intervention Prayer (assurance of)/Mather;Sustaining Presence/Ministry of presence;Nurtured Hope   Outcome Comfort   Assessment: In my encounter with the 65 yr old patient, while rounding  the unit 5K,  I provided spiritual care to patient through conversation, I also came to assess the patient's spiritual needs present. The pt was admitted due to BASIL/ acute kidney injury. Interventions:  I provided prayer, emotional support and words of comfort.  provided a listening presence and encouraged pt to share their beliefs and how they support them during their hospitalization. Outcomes: The patient was encouraged and didn't share any further spiritual needs at this time. Plan:  Chaplains will follow-up at a later time for assessment of any spiritual care needs present.

## 2023-01-17 NOTE — DISCHARGE SUMMARY
Hospital Medicine Discharge Summary      Patient Identification:   Bailey Finn   : 1965  MRN: [id number]   Account: [de-identified]      Patient's PCP: Nash Gonzalez DO    Admit Date: 1/15/2023     Discharge Date:   2023    Admitting Physician: Love Banegas MD     Discharging Nurse Practitioner: Zulema Perry, APRN - CNP     Discharge Diagnoses with Assessment/Plan:  Suicide ideation--psychiatry saw, okay for discharge home, started Lexapro 5 mg daily  BASIL on likely CKD stage III--possibly progression of disease, on 2022 creatinine was 1.48, when the patient was admitted on January 15, 2023 was noted be 2.1 and today at 1.9; renal ultrasound showed no acute process, patient is urinating well, patient really wants to be discharged home so planning outpatient urology follow-up and order placed in epic; BMP to be done 1 to 2 days prior to nephrology appointment  Hypokalemia, resolved  Hypomagnesia, resolved  Diabetes mellitus type 2, uncontrolled--hemoglobin A1c was noted be 7.4 on 2022, Lantus was decreased from 50 units to 30 units as patient had some hypoglycemia, holding Glucophage secondary to #2, patient monitors his blood sugar at home  Primary hypertension, uncontrolled--on Norvasc and Lopressor, holding lisinopril/hydrochlorothiazide with #2, adding hydralazine 25 mg twice a day, will need outpatient follow-up with PCP along with nephrology  Hyperlipidemia--treated  GERD--treated  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     Hospital Course:   Bailey Finn is a 65 y.o. male admitted to [address] [place] [address] on 1/15/2023 for suicide ideation; Per H&P done 2023: \"    Bailey Finn is a 65 y.o. male with PMHx of NIDDM2 ,HTN, HLD, GERD and CKD who presented [address]  with threat of suicidal ideation. Was transported to [facility] via [facility] and [facility] placed. Patient lives with his son and stated that he wanted to \"end it all\" and overdose on his home pills after finding out his wife whom he was trying to reconcile with was cheating on him. He stated he has had no prior attempts and stated his comments were made while in an upset/desperate state but would not actually follow through with his plan. He denies access to firearms, current drug abuse or illicit drug use. He admits to being under a lot of financial stress lately as well as dealing with illnesses in his family. He recently applied for disability but was denied and that is also adding a large amount of stress to his situation. The patient is a poor historian of past medical history and had a difficult time recalling his home medications, dosages, and recent below knee amputation on the right. He denies fever, chills, headache, lightheadedness, change in vision, rhinorrhea, congestion, sore throat, cough, hemoptysis, chest pain, palpitations, GARDINER, PND, shortness of breath, abdominal pain, nausea, vomiting, hematemesis, change in bowel/bladder habits, hematochezia, hematuria, weakness, difficulty with ambulation, numbness/tingling, or known exposure to sick contacts. \"    1/17--> hemodynamically stable, patient denies any complaints, creatinine at 1.9, patient is eating and drinking well, he denies any suicide/homicide ideation, patient desperately wants to go home today, we discussed adjustment of his blood pressure medicines along with holding Glucophage and monitoring his blood sugars at home as he states he has had some low blood sugar readings, he acknowledges all, he agrees to follow-up with nephrology outpatient, again at this time he is requesting to be discharged home secondary to appointments and his daughter having a baby so will be discharged home in stable condition with outpatient follow-ups. Exam:     Vitals:  Vitals:    01/16/23 2045 01/17/23 0415 01/17/23 0915 01/17/23 0947   BP: (!) 171/84 (!) 165/87 (!) 168/85 (!) 168/85   Pulse: 89 71 76 76   Resp: 16 16 16    Temp: 98.7 °F (37.1 °C) 98.1 °F (36.7 °C) 98.3 °F (36.8 °C)    TempSrc: Oral Oral Oral    SpO2: 99% 94% 97%    Weight:       Height:         Weight: Weight: 150 lb (68 kg)     24 hour intake/output:  Intake/Output Summary (Last 24 hours) at 1/17/2023 1150  Last data filed at 1/17/2023 0947  Gross per 24 hour   Intake 5 ml   Output --   Net 5 ml         General appearance:  No apparent distress, appears stated age and cooperative. HEENT:  Normal cephalic, atraumatic without obvious deformity. Pupils equal, round, and reactive to light. Conjunctivae/corneas clear. Neck: Supple, with full range of motion. No jugular venous distention. Trachea midline. Respiratory:  Normal respiratory effort. Clear to auscultation, bilaterally without Rales/Wheezes/Rhonchi. Cardiovascular:  Regular rate and rhythm with normal S1/S2 without murmurs, rubs or gallops. Abdomen: Soft, non-tender, non-distended with normal bowel sounds. Musculoskeletal:  No clubbing, cyanosis or edema bilaterally. Full range of motion without deformity~right below the knee amputation noted. Skin: Skin color, texture, turgor normal.    Neurologic:  Neurovascularly intact without any focal sensory/motor deficits. Cranial nerves: II-XII intact, grossly non-focal.  Psychiatric:  Alert and oriented, thought content appropriate  Capillary Refill: Brisk,< 3 seconds   Peripheral Pulses: +2 palpable, equal bilaterally       Labs:  For convenience and continuity at follow-up the following most recent labs are provided:      CBC:    Lab Results   Component Value Date/Time    WBC 10.6 01/15/2023 12:53 AM    HGB 9.7 01/15/2023 12:53 AM    HCT 28.1 01/15/2023 12:53 AM     01/15/2023 12:53 AM       Renal:    Lab Results Component Value Date/Time     01/17/2023 05:25 AM    K 3.9 01/17/2023 05:25 AM     01/17/2023 05:25 AM    CO2 23 01/17/2023 05:25 AM    BUN 22 01/17/2023 05:25 AM    CREATININE 1.9 01/17/2023 05:25 AM    CALCIUM 8.5 01/17/2023 05:25 AM    PHOS 3.7 09/28/2022 12:30 PM       Cardiac: No results for input(s): Samson Breeze in the last 72 hours. Significant Diagnostic Studies    Radiology:   US RENAL COMPLETE   Final Result   1. Normal bilateral renal ultrasound. 2. Enlarged prostate gland. Final report electronically signed by Dr. Sandi Garcia on 1/16/2023 1:11 PM             Consults:     IP CONSULT TO PSYCHIATRY    Disposition:    [x] Home       [] TCU       [] Rehab       [] Psych       [] SNF       [] [place]       [] Other-    Condition at Discharge: Stable    Code Status:  Full Code     Pending tests at discharge:      none    Patient Instructions:    Discharge lab work: none  Activity: activity as tolerated  Diet: ADULT DIET; Regular; 4 carb choices (60 gm/meal); Safety Tray; Safety Tray (Disposables)      Follow-up visits:   Larry Jorge DO  [facility] [address]   [phone]    Follow up on 1/26/2023  hospital follow up; monitor BP, your appointment time is at 8:00a, Please arrive 15mins early, Bring insurance card & Photo ID, co-pay, medication bottles & completed forms. Discharge Medications:        Medication List        START taking these medications      hydrALAZINE 25 MG tablet  Commonly known as: APRESOLINE  Take 1 tablet by mouth in the morning and at bedtime            CHANGE how you take these medications      gabapentin 300 MG capsule  Commonly known as: NEURONTIN  Take 1 capsule by mouth 2 times daily for 10 doses. What changed:   when to take this  Another medication with the same name was removed. Continue taking this medication, and follow the directions you see here.      insulin glargine 100 UNIT/ML injection vial  Commonly known as: Lantus  Inject 30 Units into the skin nightly  What changed: how much to take            CONTINUE taking these medications      albuterol sulfate  (90 Base) MCG/ACT inhaler  Commonly known as: Proventil HFA  Inhale 2 puffs into the lungs every 6 hours as needed for Wheezing     amLODIPine 10 MG tablet  Commonly known as: NORVASC  take 1 tablet by mouth once daily     aspirin 325 MG EC tablet  Take 1 tablet by mouth daily     dicyclomine 20 MG tablet  Commonly known as: Bentyl  Take 1 tablet by mouth 4 times daily (before meals and nightly)     DULoxetine 60 MG extended release capsule  Commonly known as: CYMBALTA  take 1 capsule by mouth once daily     fluticasone-salmeterol 250-50 MCG/DOSE Aepb  Commonly known as: Advair Diskus  Inhale 1 puff into the lungs every 12 hours     FreeStyle Sandra Comfrey Wendy  1 Device by Does not apply route 3 times daily (before meals)     FreeStyle Sandra Sensor System Misc  1 device every 14 days     gemfibrozil 600 MG tablet  Commonly known as: LOPID  TAKE 1 TABLET BY MOUTH TWICE DAILY     * glucose monitoring kit  1 kit by Does not apply route daily as needed     * blood glucose monitor kit and supplies  Check blood sugar q daily, Dx E11.9     HumaLOG 100 UNIT/ML injection vial  Generic drug: insulin lispro  ACHS Sliding scale Blood sugar 0-199  0 units                 200- 249-  2 units                 250-299    4 units                 300-349    6 units                 350-399    8 units     metoprolol tartrate 50 MG tablet  Commonly known as: LOPRESSOR  take 1 tablet by mouth twice a day     pantoprazole 40 MG tablet  Commonly known as: PROTONIX  Take 1 tablet by mouth every morning (before breakfast)     pravastatin 40 MG tablet  Commonly known as: PRAVACHOL  Take 1 tablet by mouth daily     Wheelchair Misc  2 each by Does not apply route daily Anti tip device for wheelchair.            * This list has 2 medication(s) that are the same as other medications prescribed for you. Read the directions carefully, and ask your doctor or other care provider to review them with you. STOP taking these medications      cyclobenzaprine 10 MG tablet  Commonly known as: FLEXERIL     Glucose Meter Test strip  Generic drug: blood glucose test strips     lidocaine 5 %  Commonly known as: Lidoderm     lisinopril-hydroCHLOROthiazide 20-25 MG per tablet  Commonly known as: PRINZIDE;ZESTORETIC     loratadine 10 MG tablet  Commonly known as: CLARITIN     metFORMIN 1000 MG tablet  Commonly known as: GLUCOPHAGE     ondansetron 4 MG tablet  Commonly known as: Cheryl Field               Where to Get Your Medications        These medications were sent to [address] [place] , [address] [facility]  [address] [facility], [address] [address]      Phone: [phone]   hydrALAZINE 25 MG tablet       Information about where to get these medications is not yet available    Ask your nurse or doctor about these medications  gabapentin 300 MG capsule  insulin glargine 100 UNIT/ML injection vial         Time Spent on discharge is more than 45 minutes in the examination, evaluation, counseling and review of medications and discharge plan. Signed: Thank you Montrell Coppola DO for the opportunity to be involved in this patient's care.     Electronically signed by DEV Wright CNP on 1/17/2023 at 11:50 AM

## 2023-01-17 NOTE — PROGRESS NOTES
Discharge teaching and instructions for diagnosis/procedure of BASIL completed with patient using teachback method. AVS reviewed. Printed prescriptions given to patient. Patient voiced understanding regarding prescriptions, follow up appointments, and care of self at home. Discharged in a wheelchair to  home with support per family.

## 2023-01-18 ENCOUNTER — TELEPHONE (OUTPATIENT)
Dept: FAMILY MEDICINE CLINIC | Age: 58
End: 2023-01-18

## 2023-01-19 NOTE — TELEPHONE ENCOUNTER
Care Transitions Initial Follow Up Call    Outreach made within 2 business days of discharge: Yes    Patient: Ceci Allred Patient : 1965   MRN: [id number]  Reason for Admission: There are no discharge diagnoses documented for the most recent discharge. Discharge Date: 23       Spoke with: Mp Cardenas    Discharge department/facility: [facility] Interactive Patient Contact:  Was patient able to fill all prescriptions: Yes  Was patient instructed to bring all medications to the follow-up visit: Yes  Is patient taking all medications as directed in the discharge summary? Yes  Does patient understand their discharge instructions: Yes  Does patient have questions or concerns that need addressed prior to 7-14 day follow up office visit: no    Appointment scheduled for 23 with Milagro Carlos.     Follow Up  Future Appointments   Date Time Provider Department Center   2023  8:00 AM DEV Nixon - [facility]NP [facility]   3/13/2023 10:20 AM [facility] Caban [facility] Micki Gay LPN

## 2023-01-25 PROBLEM — S88.111A BELOW-KNEE AMPUTATION OF RIGHT LOWER EXTREMITY (HCC): Status: ACTIVE | Noted: 2023-01-25

## 2023-01-25 PROBLEM — M54.30 SCIATICA: Status: ACTIVE | Noted: 2018-01-26

## 2023-01-25 PROBLEM — J44.89 OTHER SPECIFIED CHRONIC OBSTRUCTIVE AIRWAYS DISEASE: Status: ACTIVE | Noted: 2017-10-27

## 2023-01-25 PROBLEM — L97.509 ULCER OF FOOT (HCC): Status: ACTIVE | Noted: 2023-01-25

## 2023-01-25 PROBLEM — R10.9 ABDOMINAL PAIN: Status: ACTIVE | Noted: 2018-01-26

## 2023-01-25 PROBLEM — E83.42 HYPOMAGNESEMIA: Status: ACTIVE | Noted: 2023-01-25

## 2023-01-25 PROBLEM — J44.9 OTHER SPECIFIED CHRONIC OBSTRUCTIVE AIRWAYS DISEASE: Status: ACTIVE | Noted: 2017-10-27

## 2023-01-25 PROBLEM — F32.89 OTHER DEPRESSIVE DISORDER: Status: ACTIVE | Noted: 2023-01-15

## 2023-01-25 PROBLEM — G62.9 NEUROPATHY: Status: ACTIVE | Noted: 2017-12-04

## 2023-01-25 PROBLEM — K21.9 GASTROESOPHAGEAL REFLUX DISEASE: Status: ACTIVE | Noted: 2017-12-04

## 2023-01-25 PROBLEM — D64.9 ANEMIA: Status: ACTIVE | Noted: 2023-01-25

## 2023-01-25 PROBLEM — E44.0 PROTEIN-CALORIE MALNUTRITION, MODERATE (HCC): Status: ACTIVE | Noted: 2023-01-25

## 2023-01-25 PROBLEM — R73.9 HYPERGLYCEMIA: Status: ACTIVE | Noted: 2017-07-10

## 2023-01-25 PROBLEM — L03.90 CELLULITIS: Status: ACTIVE | Noted: 2023-01-25

## 2023-01-25 PROBLEM — L03.113 CELLULITIS OF RIGHT UPPER LIMB: Status: ACTIVE | Noted: 2021-10-10

## 2023-01-25 PROBLEM — F41.9 ANXIETY DISORDER: Status: ACTIVE | Noted: 2023-01-25

## 2023-01-25 PROBLEM — F17.200 CURRENT SMOKER: Status: ACTIVE | Noted: 2017-07-10

## 2023-01-25 PROBLEM — L02.611 ABSCESS OF RIGHT FOOT: Status: ACTIVE | Noted: 2023-01-25

## 2023-01-25 PROBLEM — A41.9 SEPSIS, UNSPECIFIED ORGANISM (HCC): Status: ACTIVE | Noted: 2022-06-13

## 2023-01-25 PROBLEM — E87.6 HYPOKALEMIA: Status: ACTIVE | Noted: 2023-01-25

## 2023-01-25 RX ORDER — AMITRIPTYLINE HYDROCHLORIDE 25 MG/1
25 TABLET, FILM COATED ORAL DAILY
COMMUNITY
Start: 2022-09-15 | End: 2023-03-01 | Stop reason: SDUPTHER

## 2023-03-01 ENCOUNTER — OFFICE VISIT (OUTPATIENT)
Dept: FAMILY MEDICINE CLINIC | Age: 58
End: 2023-03-01

## 2023-03-01 VITALS
BODY MASS INDEX: 25.7 KG/M2 | SYSTOLIC BLOOD PRESSURE: 164 MMHG | TEMPERATURE: 97.2 F | DIASTOLIC BLOOD PRESSURE: 88 MMHG | HEIGHT: 68 IN | WEIGHT: 169.6 LBS | OXYGEN SATURATION: 97 % | RESPIRATION RATE: 16 BRPM | HEART RATE: 95 BPM

## 2023-03-01 DIAGNOSIS — Z71.6 ENCOUNTER FOR SMOKING CESSATION COUNSELING: ICD-10-CM

## 2023-03-01 DIAGNOSIS — E11.65 UNCONTROLLED TYPE 2 DIABETES MELLITUS WITH HYPERGLYCEMIA, WITH LONG-TERM CURRENT USE OF INSULIN (HCC): ICD-10-CM

## 2023-03-01 DIAGNOSIS — I10 BENIGN ESSENTIAL HTN: ICD-10-CM

## 2023-03-01 DIAGNOSIS — R06.2 WHEEZING: ICD-10-CM

## 2023-03-01 DIAGNOSIS — Z79.4 UNCONTROLLED TYPE 2 DIABETES MELLITUS WITH HYPERGLYCEMIA, WITH LONG-TERM CURRENT USE OF INSULIN (HCC): ICD-10-CM

## 2023-03-01 DIAGNOSIS — F32.9 MAJOR DEPRESSIVE DISORDER WITH CURRENT ACTIVE EPISODE, UNSPECIFIED DEPRESSION EPISODE SEVERITY, UNSPECIFIED WHETHER RECURRENT: ICD-10-CM

## 2023-03-01 DIAGNOSIS — R35.1 NOCTURIA: Primary | ICD-10-CM

## 2023-03-01 DIAGNOSIS — K21.9 GASTROESOPHAGEAL REFLUX DISEASE WITHOUT ESOPHAGITIS: ICD-10-CM

## 2023-03-01 DIAGNOSIS — G54.6 PHANTOM PAIN (HCC): ICD-10-CM

## 2023-03-01 DIAGNOSIS — E11.65 UNCONTROLLED TYPE 2 DIABETES MELLITUS WITH HYPERGLYCEMIA (HCC): ICD-10-CM

## 2023-03-01 DIAGNOSIS — Z13.220 SCREENING CHOLESTEROL LEVEL: ICD-10-CM

## 2023-03-01 DIAGNOSIS — N52.8 OTHER MALE ERECTILE DYSFUNCTION: ICD-10-CM

## 2023-03-01 RX ORDER — FLASH GLUCOSE SENSOR
KIT MISCELLANEOUS
Qty: 6 EACH | Refills: 3 | Status: SHIPPED | OUTPATIENT
Start: 2023-03-01

## 2023-03-01 RX ORDER — HYDRALAZINE HYDROCHLORIDE 25 MG/1
25 TABLET, FILM COATED ORAL 2 TIMES DAILY
Qty: 180 TABLET | Refills: 3 | Status: SHIPPED | OUTPATIENT
Start: 2023-03-01

## 2023-03-01 RX ORDER — GABAPENTIN 300 MG/1
300 CAPSULE ORAL 2 TIMES DAILY
Qty: 10 CAPSULE | Refills: 0 | Status: CANCELLED | OUTPATIENT
Start: 2023-03-01 | End: 2023-03-06

## 2023-03-01 RX ORDER — SILODOSIN 4 MG/1
4 CAPSULE ORAL EVERY EVENING
Qty: 30 CAPSULE | Refills: 0 | Status: SHIPPED | OUTPATIENT
Start: 2023-03-01 | End: 2023-03-01

## 2023-03-01 RX ORDER — METOPROLOL TARTRATE 50 MG/1
TABLET, FILM COATED ORAL
Qty: 180 TABLET | Refills: 3 | Status: SHIPPED | OUTPATIENT
Start: 2023-03-01

## 2023-03-01 RX ORDER — PRAVASTATIN SODIUM 40 MG
40 TABLET ORAL DAILY
Qty: 90 TABLET | Refills: 0 | Status: SHIPPED | OUTPATIENT
Start: 2023-03-01

## 2023-03-01 RX ORDER — PANTOPRAZOLE SODIUM 40 MG/1
40 TABLET, DELAYED RELEASE ORAL
Qty: 90 TABLET | Refills: 3 | Status: SHIPPED | OUTPATIENT
Start: 2023-03-01

## 2023-03-01 RX ORDER — SILDENAFIL 50 MG/1
50 TABLET, FILM COATED ORAL PRN
Qty: 30 TABLET | Refills: 3 | Status: CANCELLED | OUTPATIENT
Start: 2023-03-01

## 2023-03-01 RX ORDER — DULOXETIN HYDROCHLORIDE 60 MG/1
CAPSULE, DELAYED RELEASE ORAL
Qty: 90 CAPSULE | Refills: 3 | Status: SHIPPED | OUTPATIENT
Start: 2023-03-01 | End: 2023-03-01 | Stop reason: ALTCHOICE

## 2023-03-01 RX ORDER — NICOTINE 21 MG/24HR
1 PATCH, TRANSDERMAL 24 HOURS TRANSDERMAL DAILY
Qty: 42 PATCH | Refills: 0 | Status: SHIPPED | OUTPATIENT
Start: 2023-03-01 | End: 2023-04-12

## 2023-03-01 RX ORDER — ESCITALOPRAM OXALATE 10 MG/1
10 TABLET ORAL DAILY
Qty: 30 TABLET | Refills: 3 | Status: SHIPPED | OUTPATIENT
Start: 2023-03-01

## 2023-03-01 RX ORDER — GABAPENTIN 400 MG/1
400 CAPSULE ORAL 3 TIMES DAILY
Qty: 90 CAPSULE | Refills: 3 | Status: SHIPPED | OUTPATIENT
Start: 2023-03-01 | End: 2023-03-31

## 2023-03-01 RX ORDER — AMITRIPTYLINE HYDROCHLORIDE 25 MG/1
25 TABLET, FILM COATED ORAL DAILY
Qty: 90 TABLET | Refills: 3 | Status: SHIPPED | OUTPATIENT
Start: 2023-03-01

## 2023-03-01 RX ORDER — TADALAFIL 5 MG/1
5 TABLET ORAL DAILY
Qty: 30 TABLET | Refills: 1 | Status: SHIPPED | OUTPATIENT
Start: 2023-03-01

## 2023-03-01 RX ORDER — ALBUTEROL SULFATE 90 UG/1
2 AEROSOL, METERED RESPIRATORY (INHALATION) EVERY 6 HOURS PRN
Qty: 1 EACH | Refills: 3 | Status: SHIPPED | OUTPATIENT
Start: 2023-03-01

## 2023-03-01 RX ORDER — AMLODIPINE BESYLATE 10 MG/1
TABLET ORAL
Qty: 90 TABLET | Refills: 3 | Status: SHIPPED | OUTPATIENT
Start: 2023-03-01

## 2023-03-01 ASSESSMENT — PATIENT HEALTH QUESTIONNAIRE - PHQ9
6. FEELING BAD ABOUT YOURSELF - OR THAT YOU ARE A FAILURE OR HAVE LET YOURSELF OR YOUR FAMILY DOWN: 1
4. FEELING TIRED OR HAVING LITTLE ENERGY: 0
2. FEELING DOWN, DEPRESSED OR HOPELESS: 3
5. POOR APPETITE OR OVEREATING: 0
SUM OF ALL RESPONSES TO PHQ QUESTIONS 1-9: 8
1. LITTLE INTEREST OR PLEASURE IN DOING THINGS: 3
SUM OF ALL RESPONSES TO PHQ9 QUESTIONS 1 & 2: 6
SUM OF ALL RESPONSES TO PHQ QUESTIONS 1-9: 8
SUM OF ALL RESPONSES TO PHQ QUESTIONS 1-9: 8
8. MOVING OR SPEAKING SO SLOWLY THAT OTHER PEOPLE COULD HAVE NOTICED. OR THE OPPOSITE, BEING SO FIGETY OR RESTLESS THAT YOU HAVE BEEN MOVING AROUND A LOT MORE THAN USUAL: 0
9. THOUGHTS THAT YOU WOULD BE BETTER OFF DEAD, OR OF HURTING YOURSELF: 0
3. TROUBLE FALLING OR STAYING ASLEEP: 0
10. IF YOU CHECKED OFF ANY PROBLEMS, HOW DIFFICULT HAVE THESE PROBLEMS MADE IT FOR YOU TO DO YOUR WORK, TAKE CARE OF THINGS AT HOME, OR GET ALONG WITH OTHER PEOPLE: 0
7. TROUBLE CONCENTRATING ON THINGS, SUCH AS READING THE NEWSPAPER OR WATCHING TELEVISION: 1
SUM OF ALL RESPONSES TO PHQ QUESTIONS 1-9: 8

## 2023-03-01 NOTE — PROGRESS NOTES
Clive Leslie is a 65 y.o. male thatpresents for Medication Refill (Pt just needs to be seen for refills) and Numbness (In hand and fingers for the last 8 months)      History obtained today from Patient and Wife. HPI    Right hand pain/numbness and tingling. Had carpal tunnel surgery on left side. HTN    Been out of medications for last 1 month or so. Does patient check BP regularly at home? - No  Current Medication regimen - hydralazine, metoprolol and amlodipine  Tolerating medications well? - yes    Shortness of breath or chest pain? No  Headache or visual complaints? Blurred vision right eye, surgery done in July by Dr Shikha Kebede  Neurologic changes like confusion? No  Extremity edema? No    BP Readings from Last 3 Encounters:   03/01/23 (!) 164/88 01/17/23 (!) 168/85   10/06/22 132/78       Diabetes Type 2    Glucose control:   Does patient check blood glucoses at home? No  Report of hypoglycemia: no  Lab Results   Component Value Date    LABA1C 7.3 (H) 01/15/2023     Lab Results   Component Value Date     08/05/2022     Nocturia x2-3 at night, frequency, feelings of complete bladder emptying. Right pinky numbness/tingling and right hand pain, carpal tunnel. Symptoms  Polyuria, Polydipsia or Polyphagia? No  Chest Pain, SOB, or Palpitations? -  No  New Vision complaints? No  Paresthesias of the extremities? Yes    Medications  Current medication were reviewed. Compliant with medications? no  Medication side effects? No  On ACE-I or ARB? Yes  On antiplatelet therapy? Yes  On Statin? Yes    Last Diabetic Eye Exam: has appt coming up    Exercise  Exercise?  No  Wt Readings from Last 3 Encounters:   03/01/23 169 lb 9.6 oz (76.9 kg)   01/15/23 150 lb (68 kg)   10/06/22 162 lb (73.5 kg)       Diet discipline?:  Low salt, fat, sugar diet?  no    Blood pressure control:  BP Readings from Last 3 Encounters:   03/01/23 (!) 164/88 01/17/23 (!) 168/85   10/06/22 132/78       Lab Results   Component Value Date    LABMICR 221.18 01/15/2023       Lab Results   Component Value Date    [address] SEE BELOW 07/10/2017     I have reviewed the patient's past medical history, past surgical history, allergies,medications, social and family history and I have made updates where appropriate.     Past Medical History:   Diagnosis Date    Abscess of leg     Allergic rhinitis     Asthma     Blood circulation, collateral     numbness in hands and feet    GERD (gastroesophageal reflux disease)     Headache(784.0)     Hyperlipidemia     Hypertension     Neuropathy     Osteoarthritis     Other disorders of kidney and ureter     Pneumonia     Type II or unspecified type diabetes mellitus without mention of complication, not stated as uncontrolled     type 2       Social History     Tobacco Use    Smoking status: Some Days     Packs/day: 1.00     Years: 30.00     Pack years: 30.00     Types: Cigars, Cigarettes     Start date: 10/18/1980     Last attempt to quit: 10/18/2004     Years since quittin.3    Smokeless tobacco: Never    Tobacco comments:     5 cigars per day   Substance Use Topics    Alcohol use: No     Comment: occassionally    Drug use: No       Family History   Problem Relation Age of Onset    High Blood Pressure Mother     Heart Disease Father     Cancer Maternal Aunt         LIVER    Colon Polyps Neg Hx          Review of Systems    Review of Systems    Constitutional: Negative for Fever, Chills, Fatigue  Cardiovascular:  Negative for Chest Pain, Palpitations  Respiratory:  Negative for Cough, Wheezing, Shortness of breath  Gastrointestinal:  Nausea, Vomiting, Diarrhea, Constipation, Blood in stool  Genitourinary:  Negative for Difficulty or painful urination,Change in frequency, Urgency  Skin:  Negative for Color change, Rash, Itching, Wound  Psychiatric:  + mood changes  Musculoskeletal:  Negative for Joint pain, Back pain, Gait problems  Neurological:  Negative for Dizziness, Headaches    PHYSICAL EXAM:  BP (!) 164/88   Pulse 95   Temp 97.2 °F (36.2 °C) (Infrared)   Resp 16   Ht 5' 8\" (1.727 m)   Wt 169 lb 9.6 oz (76.9 kg)   SpO2 97%   BMI 25.79 kg/m²     Physical Exam    PHYSICAL EXAM:   VITALS:   Vitals:    03/01/23 1400   BP: (!) 164/88   Pulse: 95   Resp: 16   Temp: 97.2 °F (36.2 °C)   SpO2: 97%     GENERAL:  Patient is a well-developed, well-nourished male  in no acute distress, alert and oriented x3, appropriate and pleasant conversation. HEAD: Normocephalic, atraumatic. EYES: Pupils equal, round and reactive to light and accommodation, extraocular   movements intact. ENT: Moist mucous membranes. No erythema is noted. NECK: Supple. No masses. No lymphadenopathy. CARDIOVASCULAR: Regular rate and rhythm. PULMONARY: bases diminished  ABDOMEN: Soft, nontender, nondistended. Positive bowel sounds. MUSCULOSKELETAL: Strength 5/5 bilaterally in all extremities. No tenderness to   palpation of the ribs, long bones, or spine. NEUROLOGIC: Cranial nerves II through XII grossly intact. No focal deficits are noted. ASSESSMENT & PLAN  Verdijosé manuel Grade was seen today for medication refill and numbness. Diagnoses and all orders for this visit:    Nocturia  -     PSA Prostatic Specific Antigen; Future    Major depressive disorder with current active episode, unspecified depression episode severity, unspecified whether recurrent  -     amitriptyline (ELAVIL) 25 MG tablet; Take 1 tablet by mouth daily  -     Discontinue: DULoxetine (CYMBALTA) 60 MG extended release capsule; take 1 capsule by mouth once daily  -     escitalopram (LEXAPRO) 10 MG tablet;  Take 1 tablet by mouth daily    Benign essential HTN  -     amLODIPine (NORVASC) 10 MG tablet; take 1 tablet by mouth once daily  -     metoprolol tartrate (LOPRESSOR) 50 MG tablet; take 1 tablet by mouth twice a day    Uncontrolled type 2 diabetes mellitus with hyperglycemia, with long-term current use of insulin (HCC)  -     metFORMIN (GLUCOPHAGE) 1000 MG tablet; Take 1 tablet by mouth 2 times daily (with meals)  -     Continuous Blood Gluc Sensor ([address] [place] [address]) MISC; 1 device every 14 days    Gastroesophageal reflux disease without esophagitis  -     pantoprazole (PROTONIX) 40 MG tablet; Take 1 tablet by mouth every morning (before breakfast)    Uncontrolled type 2 diabetes mellitus with hyperglycemia (HCC)  -     pravastatin (PRAVACHOL) 40 MG tablet; Take 1 tablet by mouth daily  -     aspirin 325 MG EC tablet; Take 1 tablet by mouth daily  -     hydrALAZINE (APRESOLINE) 25 MG tablet; Take 1 tablet by mouth in the morning and at bedtime    Wheezing  -     albuterol sulfate HFA (PROVENTIL HFA) 108 (90 Base) MCG/ACT inhaler; Inhale 2 puffs into the lungs every 6 hours as needed for Wheezing    Screening cholesterol level  -     Lipid Panel; Future    Other male erectile dysfunction  -     tadalafil (CIALIS) 5 MG tablet; Take 1 tablet by mouth daily    Phantom pain (HCC)  -     gabapentin (NEURONTIN) 400 MG capsule; Take 1 capsule by mouth 3 times daily for 30 days. Encounter for smoking cessation counseling  -     nicotine (NICODERM CQ) 21 MG/24HR; Place 1 patch onto the skin daily    Other orders  -     Discontinue: silodosin (RAPAFLO) 4 MG CAPS capsule; Take 1 capsule by mouth every evening      Return in about 1 month (around 4/1/2023) for dm check, a1c, . I spent approximately 5 minutes with patient today on smoking cessation counseling. I discussed the benefits of smoking cessation as well as the harms of continued smoking. Smokes 3 packs of cigars a day. Ready to quit. Restart medications. Will call [facility] for right hand pain/numbness/tingling    Follow up with our office 1 month     All copied or forwarded information in the progress note was verified by me to be accurate at the time of visit  Patient's past medical, surgical, social and family history were reviewed and updated     All patient questions answered. Patient voiced understanding.

## 2023-03-28 RX ORDER — LORATADINE 10 MG/1
TABLET ORAL
COMMUNITY
Start: 2022-05-28

## 2023-03-28 RX ORDER — CYCLOBENZAPRINE HCL 10 MG
TABLET ORAL
COMMUNITY
Start: 2018-03-16

## 2023-03-28 RX ORDER — SILODOSIN 4 MG/1
CAPSULE ORAL
Qty: 30 CAPSULE | Refills: 0 | Status: SHIPPED | OUTPATIENT
Start: 2023-03-28

## 2023-04-03 ENCOUNTER — OFFICE VISIT (OUTPATIENT)
Dept: FAMILY MEDICINE CLINIC | Age: 58
End: 2023-04-03

## 2023-04-03 VITALS
SYSTOLIC BLOOD PRESSURE: 162 MMHG | RESPIRATION RATE: 14 BRPM | HEIGHT: 68 IN | HEART RATE: 89 BPM | OXYGEN SATURATION: 97 % | BODY MASS INDEX: 26.19 KG/M2 | DIASTOLIC BLOOD PRESSURE: 100 MMHG | TEMPERATURE: 97.4 F | WEIGHT: 172.8 LBS

## 2023-04-03 DIAGNOSIS — F32.9 MAJOR DEPRESSIVE DISORDER WITH CURRENT ACTIVE EPISODE, UNSPECIFIED DEPRESSION EPISODE SEVERITY, UNSPECIFIED WHETHER RECURRENT: ICD-10-CM

## 2023-04-03 DIAGNOSIS — E11.65 UNCONTROLLED TYPE 2 DIABETES MELLITUS WITH HYPERGLYCEMIA (HCC): ICD-10-CM

## 2023-04-03 DIAGNOSIS — I10 BENIGN ESSENTIAL HTN: Primary | ICD-10-CM

## 2023-04-03 DIAGNOSIS — N52.9 ERECTILE DYSFUNCTION, UNSPECIFIED ERECTILE DYSFUNCTION TYPE: ICD-10-CM

## 2023-04-03 DIAGNOSIS — I10 ESSENTIAL HYPERTENSION: ICD-10-CM

## 2023-04-03 LAB — HBA1C MFR BLD: 6.5 % (ref 4.3–5.7)

## 2023-04-03 RX ORDER — DULOXETIN HYDROCHLORIDE 30 MG/1
CAPSULE, DELAYED RELEASE ORAL
Qty: 7 CAPSULE | Refills: 0 | Status: SHIPPED | OUTPATIENT
Start: 2023-04-03

## 2023-04-03 RX ORDER — HYDRALAZINE HYDROCHLORIDE 25 MG/1
25 TABLET, FILM COATED ORAL 3 TIMES DAILY
Qty: 270 TABLET | Refills: 3 | Status: SHIPPED | OUTPATIENT
Start: 2023-04-03

## 2023-04-03 RX ORDER — SILDENAFIL CITRATE 20 MG/1
TABLET ORAL
Qty: 30 TABLET | Refills: 5 | Status: SHIPPED | OUTPATIENT
Start: 2023-04-03

## 2023-04-03 RX ORDER — DULOXETIN HYDROCHLORIDE 60 MG/1
CAPSULE, DELAYED RELEASE ORAL
Qty: 90 CAPSULE | Refills: 3 | Status: SHIPPED | OUTPATIENT
Start: 2023-04-03

## 2023-04-03 NOTE — PROGRESS NOTES
Malachi Hannon is a 65 y.o. male thatpresents for Follow-up and Diabetes      History obtained today from Patient. DM follow up   A1c improving   No PPP  No low sugars at home, been checking them at home    Would like to restart Cymbalta  No difference in his ED with Cialis  Would like to try Revatio    BP still high, denies cp,sob, edema  Monitors it at home, is up and down    I have reviewed the patient's past medical history, past surgical history, allergies,medications, social and family history and I have made updates where appropriate. Past Medical History:   Diagnosis Date    Abscess of leg     Allergic rhinitis     Asthma     Blood circulation, collateral     numbness in hands and feet    GERD (gastroesophageal reflux disease)     Headache(784.0)     Hyperlipidemia     Hypertension     Neuropathy     Osteoarthritis     Other disorders of kidney and ureter     Pneumonia     Type II or unspecified type diabetes mellitus without mention of complication, not stated as uncontrolled     type 2       Social History     Tobacco Use    Smoking status: Some Days     Packs/day: 1.00     Years: 30.00     Pack years: 30.00     Types: Cigars, Cigarettes     Start date: 10/18/1980     Last attempt to quit: 10/18/2004     Years since quittin.4    Smokeless tobacco: Never    Tobacco comments:     5 cigars per day   Substance Use Topics    Alcohol use: No     Comment: occassionally    Drug use: No       Family History   Problem Relation Age of Onset    High Blood Pressure Mother     Heart Disease Father     Cancer Maternal Aunt         LIVER    Colon Polyps Neg Hx          Review of Systems        PHYSICAL EXAM:  BP (!) 162/100 (Site: Left Upper Arm, Position: Sitting)   Pulse 89   Temp 97.4 °F (36.3 °C) (Temporal)   Resp 14   Ht 5' 8\" (1.727 m)   Wt 172 lb 12.8 oz (78.4 kg)   SpO2 97%   BMI 26.27 kg/m²     Physical Exam  Constitutional:       Appearance: Normal appearance.    HENT:      Head:

## 2023-04-03 NOTE — PATIENT INSTRUCTIONS
Restart Cymbalta at 30 mg daily x 7 days, then go back to 60 mg daily  Monitor BP closely  Return to office in 1 month

## 2023-04-04 DIAGNOSIS — F32.9 MAJOR DEPRESSIVE DISORDER WITH CURRENT ACTIVE EPISODE, UNSPECIFIED DEPRESSION EPISODE SEVERITY, UNSPECIFIED WHETHER RECURRENT: ICD-10-CM

## 2023-04-04 RX ORDER — DULOXETIN HYDROCHLORIDE 30 MG/1
CAPSULE, DELAYED RELEASE ORAL
Qty: 7 CAPSULE | Refills: 0 | OUTPATIENT
Start: 2023-04-04

## 2023-04-21 ENCOUNTER — APPOINTMENT (OUTPATIENT)
Dept: GENERAL RADIOLOGY | Age: 58
End: 2023-04-21
Payer: OTHER MISCELLANEOUS

## 2023-04-21 ENCOUNTER — HOSPITAL ENCOUNTER (EMERGENCY)
Age: 58
Discharge: HOME | End: 2023-04-21
Payer: OTHER MISCELLANEOUS

## 2023-04-21 VITALS
DIASTOLIC BLOOD PRESSURE: 87 MMHG | HEART RATE: 75 BPM | WEIGHT: 173 LBS | OXYGEN SATURATION: 99 % | SYSTOLIC BLOOD PRESSURE: 180 MMHG | HEIGHT: 70 IN | RESPIRATION RATE: 14 BRPM | TEMPERATURE: 97.3 F | BODY MASS INDEX: 24.77 KG/M2

## 2023-04-21 DIAGNOSIS — S69.91XA INJURY OF RIGHT HAND, INITIAL ENCOUNTER: ICD-10-CM

## 2023-04-21 DIAGNOSIS — S39.012A STRAIN OF LUMBAR REGION, INITIAL ENCOUNTER: Primary | ICD-10-CM

## 2023-04-21 PROCEDURE — 73130 X-RAY EXAM OF HAND: CPT

## 2023-04-21 PROCEDURE — 6360000002 HC RX W HCPCS

## 2023-04-21 PROCEDURE — 96372 THER/PROPH/DIAG INJ SC/IM: CPT

## 2023-04-21 PROCEDURE — 99213 OFFICE O/P EST LOW 20 MIN: CPT

## 2023-04-21 PROCEDURE — 6370000000 HC RX 637 (ALT 250 FOR IP)

## 2023-04-21 RX ORDER — IBUPROFEN 600 MG/1
600 TABLET ORAL 4 TIMES DAILY PRN
Qty: 120 TABLET | Refills: 0 | Status: SHIPPED | OUTPATIENT
Start: 2023-04-21

## 2023-04-21 RX ORDER — KETOROLAC TROMETHAMINE 30 MG/ML
30 INJECTION, SOLUTION INTRAMUSCULAR; INTRAVENOUS ONCE
Status: COMPLETED | OUTPATIENT
Start: 2023-04-21 | End: 2023-04-21

## 2023-04-21 RX ORDER — CYCLOBENZAPRINE HCL 10 MG
10 TABLET ORAL 3 TIMES DAILY PRN
Qty: 21 TABLET | Refills: 0 | Status: SHIPPED | OUTPATIENT
Start: 2023-04-21 | End: 2023-05-01

## 2023-04-21 RX ORDER — CYCLOBENZAPRINE HCL 10 MG
10 TABLET ORAL ONCE
Status: COMPLETED | OUTPATIENT
Start: 2023-04-21 | End: 2023-04-21

## 2023-04-21 RX ADMIN — KETOROLAC TROMETHAMINE 30 MG: 30 INJECTION, SOLUTION INTRAMUSCULAR at 13:01

## 2023-04-21 RX ADMIN — CYCLOBENZAPRINE 10 MG: 10 TABLET, FILM COATED ORAL at 13:01

## 2023-04-21 ASSESSMENT — ENCOUNTER SYMPTOMS
BACK PAIN: 1
SHORTNESS OF BREATH: 0

## 2023-04-21 NOTE — ED PROVIDER NOTES
[id number]      DISCHARGE MEDICATIONS:  New Prescriptions    CYCLOBENZAPRINE (FLEXERIL) 10 MG TABLET    Take 1 tablet by mouth 3 times daily as needed for Muscle spasms    IBUPROFEN (ADVIL;MOTRIN) 600 MG TABLET    Take 1 tablet by mouth 4 times daily as needed for Pain       Discontinued Medications    CYCLOBENZAPRINE (FLEXERIL) 10 MG TABLET    Take by mouth       Current Discharge Medication List          DEV Mustafa CNP    (Please note that portions of this note were completed with a voice recognition program. Efforts were made to edit the dictations but occasionally words are mis-transcribed.)            DEV Mustafa CNP  04/21/23 [id number]

## 2023-04-21 NOTE — DISCHARGE INSTRUCTIONS
Lidocaine patches  Tylenol and Motrin  Ice or Heat   Follow up with [facility] as needed if symptoms do not get better  ER if loss of bowel, bladder, numbness or tingling occurs.

## 2023-04-28 RX ORDER — SILODOSIN 4 MG/1
CAPSULE ORAL
Qty: 30 CAPSULE | Refills: 0 | Status: SHIPPED | OUTPATIENT
Start: 2023-04-28

## 2023-05-25 DIAGNOSIS — E11.65 UNCONTROLLED TYPE 2 DIABETES MELLITUS WITH HYPERGLYCEMIA (HCC): ICD-10-CM

## 2023-05-25 RX ORDER — PRAVASTATIN SODIUM 40 MG
TABLET ORAL
Qty: 90 TABLET | Refills: 0 | Status: SHIPPED | OUTPATIENT
Start: 2023-05-25

## 2023-05-25 NOTE — TELEPHONE ENCOUNTER
Recent Visits  Date Type Provider Dept   04/03/23 Office Visit DEV Rubalcava - CNP [facility]   03/01/23 Office Visit Inderjit Escobedo APRN - CNP [facility]   09/26/22 Office Visit Inderjit Escobedo APRN - CNP [facility]  [address]   06/21/22 Office Visit Inderjit Escobedo APRN - CNP [facility]  [address] recent visits within past 540 days with a meds authorizing provider and meeting all other requirements  Future Appointments  No visits were found meeting these conditions. Showing future appointments within next 150 days with a meds authorizing provider and meeting all other requirements     No future appointments.

## 2023-05-30 RX ORDER — SILODOSIN 4 MG/1
CAPSULE ORAL
Qty: 30 CAPSULE | Refills: 0 | Status: SHIPPED | OUTPATIENT
Start: 2023-05-30

## 2023-06-06 ENCOUNTER — OFFICE VISIT (OUTPATIENT)
Dept: FAMILY MEDICINE CLINIC | Age: 58
End: 2023-06-06

## 2023-06-06 VITALS
SYSTOLIC BLOOD PRESSURE: 164 MMHG | OXYGEN SATURATION: 95 % | WEIGHT: 164 LBS | BODY MASS INDEX: 23.48 KG/M2 | HEART RATE: 87 BPM | RESPIRATION RATE: 14 BRPM | HEIGHT: 70 IN | TEMPERATURE: 97.3 F | DIASTOLIC BLOOD PRESSURE: 96 MMHG

## 2023-06-06 DIAGNOSIS — Z71.6 ENCOUNTER FOR SMOKING CESSATION COUNSELING: ICD-10-CM

## 2023-06-06 DIAGNOSIS — N17.9 AKI (ACUTE KIDNEY INJURY) (HCC): ICD-10-CM

## 2023-06-06 DIAGNOSIS — Z13.220 SCREENING FOR HYPERLIPIDEMIA: ICD-10-CM

## 2023-06-06 DIAGNOSIS — H60.312 ACUTE DIFFUSE OTITIS EXTERNA OF LEFT EAR: ICD-10-CM

## 2023-06-06 DIAGNOSIS — N52.9 ERECTILE DYSFUNCTION, UNSPECIFIED ERECTILE DYSFUNCTION TYPE: ICD-10-CM

## 2023-06-06 DIAGNOSIS — G54.6 PHANTOM PAIN (HCC): ICD-10-CM

## 2023-06-06 DIAGNOSIS — Z01.818 PREOP EXAMINATION: Primary | ICD-10-CM

## 2023-06-06 DIAGNOSIS — I10 ESSENTIAL HYPERTENSION: ICD-10-CM

## 2023-06-06 RX ORDER — SILDENAFIL 50 MG/1
50 TABLET, FILM COATED ORAL PRN
Qty: 30 TABLET | Refills: 3 | Status: SHIPPED | OUTPATIENT
Start: 2023-06-06

## 2023-06-06 RX ORDER — DOXYCYCLINE HYCLATE 100 MG
100 TABLET ORAL 2 TIMES DAILY
Qty: 14 TABLET | Refills: 0 | Status: SHIPPED | OUTPATIENT
Start: 2023-06-06 | End: 2023-06-13

## 2023-06-06 RX ORDER — NICOTINE 21 MG/24HR
1 PATCH, TRANSDERMAL 24 HOURS TRANSDERMAL DAILY
Qty: 42 PATCH | Refills: 0 | Status: SHIPPED | OUTPATIENT
Start: 2023-06-06 | End: 2023-07-18

## 2023-06-06 RX ORDER — GABAPENTIN 400 MG/1
400 CAPSULE ORAL 3 TIMES DAILY
Qty: 90 CAPSULE | Refills: 3 | Status: SHIPPED | OUTPATIENT
Start: 2023-06-06 | End: 2023-10-04

## 2023-06-06 RX ORDER — HYDROCHLOROTHIAZIDE 25 MG/1
25 TABLET ORAL EVERY MORNING
Qty: 30 TABLET | Refills: 1 | Status: SHIPPED | OUTPATIENT
Start: 2023-06-06

## 2023-06-16 ENCOUNTER — TELEPHONE (OUTPATIENT)
Dept: CARDIOLOGY CLINIC | Age: 58
End: 2023-06-16

## 2023-06-21 NOTE — TELEPHONE ENCOUNTER
LM for patient to call our office back. FYI: Avila Del Rio CNP-we have received referral and we are attempting to reach patient. We have left several messages for patient but haven't received a phone call back from patient. Is your office able to reach out to patient and have him call our office?

## 2023-06-26 PROBLEM — H60.90 OTITIS EXTERNA: Status: ACTIVE | Noted: 2023-06-26

## 2023-06-27 DIAGNOSIS — F32.9 MAJOR DEPRESSIVE DISORDER WITH CURRENT ACTIVE EPISODE, UNSPECIFIED DEPRESSION EPISODE SEVERITY, UNSPECIFIED WHETHER RECURRENT: ICD-10-CM

## 2023-06-28 RX ORDER — SILODOSIN 4 MG/1
CAPSULE ORAL
Qty: 30 CAPSULE | Refills: 0 | Status: SHIPPED | OUTPATIENT
Start: 2023-06-28

## 2023-07-03 ENCOUNTER — OFFICE VISIT (OUTPATIENT)
Dept: NEPHROLOGY | Age: 58
End: 2023-07-03
Payer: COMMERCIAL

## 2023-07-03 ENCOUNTER — OFFICE VISIT (OUTPATIENT)
Dept: FAMILY MEDICINE CLINIC | Age: 58
End: 2023-07-03
Payer: COMMERCIAL

## 2023-07-03 ENCOUNTER — TELEPHONE (OUTPATIENT)
Dept: FAMILY MEDICINE CLINIC | Age: 58
End: 2023-07-03

## 2023-07-03 VITALS
HEART RATE: 71 BPM | BODY MASS INDEX: 25.37 KG/M2 | HEIGHT: 70 IN | SYSTOLIC BLOOD PRESSURE: 128 MMHG | RESPIRATION RATE: 14 BRPM | TEMPERATURE: 98.4 F | WEIGHT: 177.2 LBS | OXYGEN SATURATION: 95 % | DIASTOLIC BLOOD PRESSURE: 88 MMHG

## 2023-07-03 VITALS
HEART RATE: 69 BPM | WEIGHT: 174 LBS | OXYGEN SATURATION: 100 % | BODY MASS INDEX: 24.97 KG/M2 | DIASTOLIC BLOOD PRESSURE: 88 MMHG | SYSTOLIC BLOOD PRESSURE: 165 MMHG

## 2023-07-03 DIAGNOSIS — F32.9 MAJOR DEPRESSIVE DISORDER WITH CURRENT ACTIVE EPISODE, UNSPECIFIED DEPRESSION EPISODE SEVERITY, UNSPECIFIED WHETHER RECURRENT: ICD-10-CM

## 2023-07-03 DIAGNOSIS — I10 PRIMARY HYPERTENSION: ICD-10-CM

## 2023-07-03 DIAGNOSIS — H92.02 LEFT EAR PAIN: ICD-10-CM

## 2023-07-03 DIAGNOSIS — E11.21 DIABETIC NEPHROPATHY ASSOCIATED WITH TYPE 2 DIABETES MELLITUS (HCC): ICD-10-CM

## 2023-07-03 DIAGNOSIS — Z87.891 PERSONAL HISTORY OF TOBACCO USE: Primary | ICD-10-CM

## 2023-07-03 DIAGNOSIS — N18.32 STAGE 3B CHRONIC KIDNEY DISEASE (HCC): Primary | ICD-10-CM
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
## 2023-07-03 PROCEDURE — 3077F SYST BP >= 140 MM HG: CPT | Performed by: INTERNAL MEDICINE

## 2023-07-03 PROCEDURE — 99204 OFFICE O/P NEW MOD 45 MIN: CPT | Performed by: INTERNAL MEDICINE

## 2023-07-03 PROCEDURE — G0296 VISIT TO DETERM LDCT ELIG: HCPCS | Performed by: NURSE PRACTITIONER

## 2023-07-03 PROCEDURE — 3079F DIAST BP 80-89 MM HG: CPT | Performed by: NURSE PRACTITIONER

## 2023-07-03 PROCEDURE — 3074F SYST BP LT 130 MM HG: CPT | Performed by: NURSE PRACTITIONER

## 2023-07-03 PROCEDURE — 3044F HG A1C LEVEL LT 7.0%: CPT | Performed by: INTERNAL MEDICINE

## 2023-07-03 PROCEDURE — 99213 OFFICE O/P EST LOW 20 MIN: CPT | Performed by: NURSE PRACTITIONER

## 2023-07-03 PROCEDURE — 3079F DIAST BP 80-89 MM HG: CPT | Performed by: INTERNAL MEDICINE

## 2023-07-03 RX ORDER — ESCITALOPRAM OXALATE 10 MG/1
TABLET ORAL
Qty: 30 TABLET | Refills: 3 | Status: SHIPPED | OUTPATIENT
Start: 2023-07-03

## 2023-07-03 ASSESSMENT — ENCOUNTER SYMPTOMS: SHORTNESS OF BREATH: 0

## 2023-07-03 NOTE — PROGRESS NOTES
Savannah Rios is a 65 y.o. male thatpresents for Hearing Problem      History obtained today from Patient. HPI    Left ear pain/ hearing    getting better, denies hearing issues  Was treated with ear drops but had to go to [facility] er as hearing loss developed and felt ear was blocked off. He reports he had tube placed but then was able to remove in 7 days. He has had no issues since that time. No fevers  Or pain    Needs cardiac clearance for eye surgery    CKD stage 3b  Seen by Dr Hu Painting today as new patient  Loop diuretic may need to be stopped if Crt above 2        I have reviewed the patient's past medical history, past surgical history, allergies,medications, social and family history and I have made updates where appropriate. Past Medical History:   Diagnosis Date    Abscess of leg     Allergic rhinitis     Asthma     Blood circulation, collateral     numbness in hands and feet    GERD (gastroesophageal reflux disease)     Headache(784.0)     Hyperlipidemia     Hypertension     Neuropathy     Osteoarthritis     Other disorders of kidney and ureter     Pneumonia     Type II or unspecified type diabetes mellitus without mention of complication, not stated as uncontrolled     type 2       Social History     Tobacco Use    Smoking status: Every Day     Packs/day: 1.00     Years: 30.00     Pack years: 30.00     Types: Cigars, Cigarettes     Start date: 10/18/1980     Last attempt to quit: 10/18/2004     Years since quittin.7    Smokeless tobacco: Never    Tobacco comments:     5 cigars per day   Substance Use Topics    Alcohol use: Yes     Comment: occassionally    Drug use: No       Family History   Problem Relation Age of Onset    High Blood Pressure Mother     Heart Disease Father     Cancer Maternal Aunt         LIVER    Colon Polyps Neg Hx          Review of Systems   Constitutional:  Negative for activity change and appetite change. HENT:  Negative for ear discharge and ear pain.

## 2023-07-03 NOTE — PATIENT INSTRUCTIONS
Learning About Lung Cancer Screening  What is screening for lung cancer? Lung cancer screening is a way to find some lung cancers early, before a person has any symptoms of the cancer. Lung cancer screening may help those who have the highest risk for lung cancer--people age 48 and older who are or were heavy smokers. For most people, who aren't at increased risk, screening for lung cancer probably isn't helpful. Screening won't prevent cancer. And it may not find all lung cancers. Lung cancer screening may lower the risk of dying from lung cancer in a small number of people. How is it done? Lung cancer screening is done with a low-dose CT (computed tomography) scan. A CT scan uses X-rays, or radiation, to make detailed pictures of your body. Experts recommend that screening be done in medical centers that focus on finding and treating lung cancer. Who is screening recommended for? Lung cancer screening is recommended for people age 48 and older who are or were heavy smokers. That means people with a smoking history of at least 20 pack years. A pack year is a way to measure how heavy a smoker you are or were. To figure out your pack years, multiply how many packs a day on average (assuming 20 cigarettes per pack) you have smoked by how many years you have smoked. For example: If you smoked 1 pack a day for 20 years, that's 1 times 20. So you have a smoking history of 20 pack years. If you smoked 2 packs a day for 10 years, that's 2 times 10. So you have a smoking history of 20 pack years. Experts agree that screening is for people who have a high risk of lung cancer. But experts don't agree on what high risk means. Some say people age 48 or older with at least a 20-pack-year smoking history are high risk. Others say it's people age 54 or older with a 30-pack-year history. To see if you could benefit from screening, first find out if you are at high risk for lung cancer.  Your doctor can help you S/p Resolute Stent yesterday, scheduled for another stent today. I stopped by patient's room and spoke with patient and wife. We reviewed what a stent is, risk factor modification, smoking cessation and cardiac rehab. Once his stents are placed and his bunion surgery has healed he will be ready for cardiac rehab near his home at [facility]. Sign off case.

## 2023-07-03 NOTE — TELEPHONE ENCOUNTER
----- Message from DEV Giraldo CNP sent at 7/3/2023  3:35 PM EDT -----  Can we get records from Dr Larisa Castillo' office at [facility] ?   Or ER notes from [facility]   Thank you
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
## 2023-07-05 NOTE — TELEPHONE ENCOUNTER
Spoke with Erica Ruffin @ Dr Keerthi Lugo' office- pt has not be seen in their office he was a no show on 6/21/23    Phoned [facility] medical records  spoke with  Nishi Alford   She will fax ER notes from 6/14/23

## 2023-07-05 NOTE — TELEPHONE ENCOUNTER
Thank you  Please let pt know he had appt with Dr Melissa Madrid 6/21 which he missed and I recommend he call to reschedule this to follow up on his ear.

## 2023-07-07 ENCOUNTER — OFFICE VISIT (OUTPATIENT)
Dept: CARDIOLOGY CLINIC | Age: 58
End: 2023-07-07
Payer: COMMERCIAL

## 2023-07-07 VITALS
HEART RATE: 67 BPM | WEIGHT: 174 LBS | HEIGHT: 70 IN | DIASTOLIC BLOOD PRESSURE: 66 MMHG | BODY MASS INDEX: 24.91 KG/M2 | SYSTOLIC BLOOD PRESSURE: 107 MMHG

## 2023-07-07 DIAGNOSIS — Z01.818 PRE-OP EVALUATION: Primary | ICD-10-CM

## 2023-07-07 DIAGNOSIS — Z82.49 FAMILY HISTORY OF PREMATURE CAD: ICD-10-CM

## 2023-07-07 DIAGNOSIS — E78.00 PURE HYPERCHOLESTEROLEMIA: ICD-10-CM

## 2023-07-07 DIAGNOSIS — R94.31 ABNORMAL EKG: ICD-10-CM

## 2023-07-07 DIAGNOSIS — I10 ESSENTIAL HYPERTENSION: ICD-10-CM

## 2023-07-07 DIAGNOSIS — R06.09 DOE (DYSPNEA ON EXERTION): ICD-10-CM

## 2023-07-07 DIAGNOSIS — Z87.891 EX-SMOKER FOR MORE THAN 1 YEAR: ICD-10-CM

## 2023-07-07 PROCEDURE — 3078F DIAST BP <80 MM HG: CPT | Performed by: INTERNAL MEDICINE

## 2023-07-07 PROCEDURE — 99204 OFFICE O/P NEW MOD 45 MIN: CPT | Performed by: INTERNAL MEDICINE

## 2023-07-07 PROCEDURE — 3074F SYST BP LT 130 MM HG: CPT | Performed by: INTERNAL MEDICINE

## 2023-07-07 NOTE — PROGRESS NOTES
Chief Complaint   Patient presents with    New Patient    Pre-op Exam     Eye Surgery   Seen by Dr. Mike Martin 2016 for HTN and HLP    New patient here for clearance for eye surgery in [address] [facility].     Denied chest pain,  dizziness or palpitation or edema    AKA RT 2022 and had prosthesis    Dixon    Hx of DM for 30 yrs    Ex smoker  for 15 yrs    FHX  None for CAD  But father had MI in his 52's      Past Surgical History:   Procedure Laterality Date    APPENDECTOMY  12    bree Muhammad [address] [facility]      WNL, no stents     COLONOSCOPY Left 2018    COLONOSCOPY POLYPECTOMY HOT BIOPSY performed by Jamir Phillips MD at [address]     Rt leg    HAND SURGERY Left     VASCULAR SURGERY      pinched nerve left arm       Allergies   Allergen Reactions    Sitagliptin Swelling     Other reaction(s): AOF, Lips swell, Swelling    Sulfa Antibiotics Swelling     Other reaction(s): Swelling  Other reaction(s): AOF    Cephalexin Swelling    Cephalexin      Other reaction(s): Lips swell, U    Quinapril Hcl Swelling        Family History   Problem Relation Age of Onset    High Blood Pressure Mother     Heart Disease Father     Cancer Maternal Aunt         LIVER    Colon Polyps Neg Hx         Social History     Socioeconomic History    Marital status:      Spouse name: Not on file    Number of children: Not on file    Years of education: Not on file    Highest education level: Not on file   Occupational History    Not on file   Tobacco Use    Smoking status: Every Day     Packs/day: 1.00     Years: 30.00     Pack years: 30.00     Types: Cigars, Cigarettes     Start date: 10/18/1980     Last attempt to quit: 10/18/2004     Years since quittin.7    Smokeless tobacco: Never    Tobacco comments:     5 cigars per day   Substance and Sexual Activity    Alcohol use: Yes     Comment: occassionally    Drug use: No    Sexual activity: Yes     Partners:

## 2023-07-11 LAB
ABSOLUTE BASO #: 100 /CMM (ref 0–200)
ABSOLUTE EOS #: 400 /CMM (ref 0–500)
ABSOLUTE LYMPH #: 1700 /CMM (ref 1000–4800)
ABSOLUTE MONO #: 1000 /CMM (ref 0–800)
ABSOLUTE NEUT #: 9000 /CMM (ref 1800–7700)
ANION GAP SERPL CALCULATED.3IONS-SCNC: 5 MMOL/L (ref 4–12)
BASOPHILS RELATIVE PERCENT: 0.6 % (ref 0–2)
BUN BLDV-MCNC: 47 MG/DL (ref 7–20)
CALCIUM SERPL-MCNC: 8.8 MG/DL (ref 8.8–10.5)
CHLORIDE BLD-SCNC: 105 MEQ/L (ref 101–111)
CHOLESTEROL/HDL RELATIVE RISK: 4.8 (ref 4–5)
CHOLESTEROL: 166 MG/DL
CO2: 26 MEQ/L (ref 21–32)
CREAT SERPL-MCNC: 2.54 MG/DL (ref 0.6–1.3)
CREATININE CLEARANCE: 26
DIRECT-LDL / HDL RISK: 3.1
EOSINOPHILS RELATIVE PERCENT: 2.9 % (ref 0–6)
GLUCOSE: 104 MG/DL (ref 70–110)
HCT VFR BLD CALC: 25.7 % (ref 40–49)
HDLC SERPL-MCNC: 34 MG/DL
HEMOGLOBIN: 9.1 GM/DL (ref 13.5–16.5)
LDL CHOLESTEROL DIRECT: 108 MG/DL
LYMPHOCYTES RELATIVE PERCENT: 14.2 % (ref 15–45)
MCH RBC QN AUTO: 30 PG (ref 27.5–33)
MCHC RBC AUTO-ENTMCNC: 35.2 GM/DL (ref 33–36)
MCV RBC AUTO: 85.4 CU MIC (ref 80–97)
MONOCYTES RELATIVE PERCENT: 8 % (ref 2–10)
NEUTROPHILS RELATIVE PERCENT: 74.3 % (ref 40–70)
NUCLEATED RBCS: 0 /100 WBC
PDW BLD-RTO: 14.8 % (ref 12–16)
PLATELET # BLD: 162 TH/CMM (ref 150–400)
POTASSIUM SERPL-SCNC: 4.4 MEQ/L (ref 3.6–5)
PROSTATE SPECIFIC ANTIGEN: 0.59 NG/ML
RBC # BLD: 3.01 MIL/CMM (ref 4.5–6)
SODIUM BLD-SCNC: 136 MEQ/L (ref 135–145)
TRIGL SERPL-MCNC: 169 MG/DL
VLDLC SERPL CALC-MCNC: 33 MG/DL
WBC # BLD: 12.2 TH/CMM (ref 4.4–10.5)

## 2023-07-19 ENCOUNTER — TELEPHONE (OUTPATIENT)
Dept: NEPHROLOGY | Age: 58
End: 2023-07-19

## 2023-07-19 ENCOUNTER — HOSPITAL ENCOUNTER (OUTPATIENT)
Dept: CT IMAGING | Age: 58
Discharge: HOME | End: 2023-07-19
Payer: COMMERCIAL

## 2023-07-19 DIAGNOSIS — Z87.891 PERSONAL HISTORY OF TOBACCO USE: ICD-10-CM

## 2023-07-19 DIAGNOSIS — N18.32 STAGE 3B CHRONIC KIDNEY DISEASE (HCC): Primary | ICD-10-CM

## 2023-07-19 PROCEDURE — 71271 CT THORAX LUNG CANCER SCR C-: CPT

## 2023-07-19 NOTE — TELEPHONE ENCOUNTER
----- Message from Fiona Valentin MD sent at 7/19/2023  4:14 PM EDT -----  Kidney function is worse than before. It is likely due to the hydrochlorothiazide. Decrease hydrochlorothiazide to 1/2 tablet a day  BMP a day or 2 before the next appointment.

## 2023-07-21 ENCOUNTER — TELEPHONE (OUTPATIENT)
Dept: FAMILY MEDICINE CLINIC | Age: 58
End: 2023-07-21

## 2023-07-21 DIAGNOSIS — D64.9 ANEMIA, UNSPECIFIED TYPE: Primary | ICD-10-CM

## 2023-07-21 NOTE — TELEPHONE ENCOUNTER
----- Message from DEV Guthrie CNP sent at 7/21/2023  9:20 AM EDT -----  CT lung screening negative  Recommend 1 year fu

## 2023-07-21 NOTE — TELEPHONE ENCOUNTER
Tried to reach patient, no answer, states \" the person you dialed is not able to receive calls at this time\"

## 2023-07-21 NOTE — TELEPHONE ENCOUNTER
----- Message from DEV Bland CNP sent at 7/21/2023  9:30 AM EDT -----  Cholesterol stable, continue Pravastatin  WBC mildly elevated and HGB low, would like to repeat blood work   Any URI symptoms or /feeling sick at all?    Psa normal    Will place orders for repeat labs in next 1-2 weeks

## 2023-07-21 NOTE — TELEPHONE ENCOUNTER
Tried to reach patient, no answer, states \" the person you dialed is not able to receive calls at this time\"     Labs mailed to given address

## 2023-07-26 ENCOUNTER — TELEPHONE (OUTPATIENT)
Dept: CARDIOLOGY CLINIC | Age: 58
End: 2023-07-26

## 2023-07-28 ENCOUNTER — TELEPHONE (OUTPATIENT)
Dept: CARDIOLOGY CLINIC | Age: 58
End: 2023-07-28

## 2023-07-28 NOTE — TELEPHONE ENCOUNTER
Patient no showed stress and echo on 7/25/23.   Left several messages for him to call us to get rescheduled

## 2023-08-01 DIAGNOSIS — I10 ESSENTIAL HYPERTENSION: ICD-10-CM

## 2023-08-01 RX ORDER — HYDROCHLOROTHIAZIDE 25 MG/1
25 TABLET ORAL EVERY MORNING
Qty: 90 TABLET | Refills: 0 | Status: SHIPPED | OUTPATIENT
Start: 2023-08-01

## 2023-08-01 NOTE — TELEPHONE ENCOUNTER
Recent Visits  Date Type Provider Dept   07/03/23 Office Visit DEV Domingo CNP [facility]   06/06/23 Office Visit DEV Domingo CNP [facility]   04/03/23 Office Visit Lora YunDEV - CNP [facility]   03/01/23 Office Visit DEV Domingo CNP [facility]   09/26/22 Office Visit DEV Domingo CNP [facility] [address]   06/21/22 Office Visit Malaika CalderonDEV - CNP [facility] [address] recent visits within past 540 days with a meds authorizing provider and meeting all other requirements  Future Appointments  Date Type Provider Dept   10/03/23 Appointment DEV Domingo CNP [facility]   Showing future appointments within next 150 days with a meds authorizing provider and meeting all other requirements     Future Appointments   Date Time Provider [address]   8/3/2023  2:40 PM MD CRISTHIAN Chadwick [facility]   8/15/2023  1:00 PM MD CRISTHIAN Miner [facility]   10/3/2023  3:00 PM DEV Domingo CNP [facility]

## 2023-08-02 RX ORDER — SILODOSIN 4 MG/1
CAPSULE ORAL
Qty: 90 CAPSULE | Refills: 0 | Status: SHIPPED | OUTPATIENT
Start: 2023-08-02

## 2023-08-02 NOTE — TELEPHONE ENCOUNTER
Recent Visits  Date Type Provider Dept   07/03/23 Office Visit DEV Domingo CNP [facility]   06/06/23 Office Visit DEV Domingo CNP [facility]   04/03/23 Office Visit DEV Holder - CNP [facility]   03/01/23 Office Visit DEV Domingo CNP [facility]   09/26/22 Office Visit DEV Domingo CNP [facility] [address]   06/21/22 Office Visit DEV Domingo - CNP [facility] [address] recent visits within past 540 days with a meds authorizing provider and meeting all other requirements  Future Appointments  Date Type Provider Dept   10/03/23 Appointment DEV Domingo CNP [facility]   Showing future appointments within next 150 days with a meds authorizing provider and meeting all other requirements

## 2023-08-04 DIAGNOSIS — E11.21 DIABETIC NEPHROPATHY ASSOCIATED WITH TYPE 2 DIABETES MELLITUS (HCC): ICD-10-CM

## 2023-08-04 DIAGNOSIS — E78.2 MIXED HYPERLIPIDEMIA: ICD-10-CM

## 2023-08-04 DIAGNOSIS — N18.32 STAGE 3B CHRONIC KIDNEY DISEASE (HCC): Primary | ICD-10-CM

## 2023-08-04 DIAGNOSIS — I10 PRIMARY HYPERTENSION: ICD-10-CM

## 2023-08-04 DIAGNOSIS — I10 ESSENTIAL HYPERTENSION: ICD-10-CM

## 2023-08-04 DIAGNOSIS — E44.0 PROTEIN-CALORIE MALNUTRITION, MODERATE (HCC): ICD-10-CM

## 2023-08-06 PROBLEM — Z01.818 PRE-OP EVALUATION: Status: RESOLVED | Noted: 2023-07-07 | Resolved: 2023-08-06

## 2023-08-23 ENCOUNTER — HOSPITAL ENCOUNTER (OUTPATIENT)
Dept: NON INVASIVE DIAGNOSTICS | Age: 58
Discharge: HOME | End: 2023-08-23
Attending: INTERNAL MEDICINE
Payer: COMMERCIAL

## 2023-08-23 DIAGNOSIS — E78.00 PURE HYPERCHOLESTEROLEMIA: ICD-10-CM

## 2023-08-23 DIAGNOSIS — Z82.49 FAMILY HISTORY OF PREMATURE CAD: ICD-10-CM

## 2023-08-23 DIAGNOSIS — Z87.891 EX-SMOKER FOR MORE THAN 1 YEAR: ICD-10-CM

## 2023-08-23 DIAGNOSIS — R94.31 ABNORMAL EKG: ICD-10-CM

## 2023-08-23 DIAGNOSIS — R06.09 DOE (DYSPNEA ON EXERTION): ICD-10-CM

## 2023-08-23 DIAGNOSIS — I10 ESSENTIAL HYPERTENSION: ICD-10-CM

## 2023-08-23 DIAGNOSIS — E11.65 UNCONTROLLED TYPE 2 DIABETES MELLITUS WITH HYPERGLYCEMIA (HCC): ICD-10-CM

## 2023-08-23 DIAGNOSIS — Z01.818 PRE-OP EVALUATION: ICD-10-CM

## 2023-08-23 LAB
LV EF: 50 %
LVEF MODALITY: NORMAL

## 2023-08-23 PROCEDURE — 93306 TTE W/DOPPLER COMPLETE: CPT

## 2023-08-23 PROCEDURE — 3430000000 HC RX DIAGNOSTIC RADIOPHARMACEUTICAL: Performed by: INTERNAL MEDICINE

## 2023-08-23 PROCEDURE — 93017 CV STRESS TEST TRACING ONLY: CPT | Performed by: INTERNAL MEDICINE

## 2023-08-23 PROCEDURE — A9500 TC99M SESTAMIBI: HCPCS | Performed by: INTERNAL MEDICINE

## 2023-08-23 PROCEDURE — 6360000002 HC RX W HCPCS

## 2023-08-23 PROCEDURE — 78452 HT MUSCLE IMAGE SPECT MULT: CPT | Performed by: INTERNAL MEDICINE

## 2023-08-23 RX ORDER — PRAVASTATIN SODIUM 40 MG
TABLET ORAL
Qty: 90 TABLET | Refills: 0 | Status: SHIPPED | OUTPATIENT
Start: 2023-08-23

## 2023-08-23 RX ORDER — TETRAKIS(2-METHOXYISOBUTYLISOCYANIDE)COPPER(I) TETRAFLUOROBORATE 1 MG/ML
9.1 INJECTION, POWDER, LYOPHILIZED, FOR SOLUTION INTRAVENOUS
Status: COMPLETED | OUTPATIENT
Start: 2023-08-23 | End: 2023-08-23

## 2023-08-23 RX ORDER — TETRAKIS(2-METHOXYISOBUTYLISOCYANIDE)COPPER(I) TETRAFLUOROBORATE 1 MG/ML
33.1 INJECTION, POWDER, LYOPHILIZED, FOR SOLUTION INTRAVENOUS
Status: COMPLETED | OUTPATIENT
Start: 2023-08-23 | End: 2023-08-23

## 2023-08-23 RX ADMIN — Medication 9.1 MILLICURIE: at 13:53

## 2023-08-23 RX ADMIN — Medication 33.1 MILLICURIE: at 14:50

## 2023-08-23 NOTE — TELEPHONE ENCOUNTER
Recent Visits  Date Type Provider Dept   07/03/23 Office Visit Blanca Bishop, APRN - CNP [facility]   06/06/23 Office Visit Blanca Bishop, APRN - CNP [facility]   04/03/23 Office Visit Verner Lora, APRN - CNP [facility]   03/01/23 Office Visit Blanca Bishop, APRN - CNP [facility]   10/06/22 Office Visit Verner Lora, APRN - CNP [facility]   09/26/22 Office Visit Blanca Bishop, APRN - CNP [facility]   06/21/22 Office Visit Blanca Bishop, APRN - CNP [facility]   04/11/22 Office Visit Anival Saavedra DO [facility] [address]   Showing recent visits within past 540 days with a meds authorizing provider and meeting all other requirements  Future Appointments  Date Type Provider Dept   10/03/23 Appointment Blanca Bishop, APRN - CNP [facility]   Showing future appointments within next 150 days with a meds authorizing provider and meeting all other requirements     Future Appointments   Date Time Provider [address]   8/23/2023  1:00 PM STR ECHO RM2 STR[facility] ECHO [facility]   8/23/2023  2:00 PM STR NUC MED HC  INJECT  [address]   8/23/2023  2:30 PM STR [facility] ROOM [address] [facility]   8/23/2023  3:00 PM STR NM STRESS RM[address] [facility]   8/23/2023  3:30 PM STR [facility] [address]   10/3/2023  3:00 PM Blanca Bishop, [facility] - [facility] [place]   10/9/2023  9:00 AM Chaz Simth MD N [facility] [place]

## 2023-08-24 ENCOUNTER — TELEPHONE (OUTPATIENT)
Dept: CARDIOLOGY CLINIC | Age: 58
End: 2023-08-24

## 2023-08-24 NOTE — TELEPHONE ENCOUNTER
Pt had a stress test and ECHO done for pre op clearance for eye surgery. Per Dr Mei Hensley note, \"if okay, may proceed at low to moderate risk\". Stress and ECHO ok. Called pt. No answer. No VM. Who is doing surgery? Pre op form in Dr Mei Hensley box.

## 2023-08-31 NOTE — TELEPHONE ENCOUNTER
Tried calling pt work number, they said he has not worked there in years, tried calling pt spouse number the joe said I have the wrong number. Both home and mobile do not work.

## 2023-09-01 NOTE — TELEPHONE ENCOUNTER
Spoke with pt. Found a different number on a referral.  Updated pts phone number. Pt states [facility] will be doing surgery. Clearance form faxed.

## 2023-09-01 NOTE — TELEPHONE ENCOUNTER
Dr. Berna Jeffery eye surgery is in his chart from a while back, did contact their office to see if they are seeing the pt, and the call center will have them call us back.

## 2023-10-03 ENCOUNTER — TELEPHONE (OUTPATIENT)
Dept: FAMILY MEDICINE CLINIC | Age: 58
End: 2023-10-03

## 2023-10-03 PROBLEM — J44.89 OTHER SPECIFIED CHRONIC OBSTRUCTIVE PULMONARY DISEASE: Status: ACTIVE | Noted: 2017-10-27

## 2023-10-03 NOTE — TELEPHONE ENCOUNTER
Received fax and phone call from Dr Mark Aquino office requesting clearance for eye surgery  Pt has completed stress test and echo -cardiac clearance    Paperwork scanned in to media and placed on your keyboard

## 2023-10-04 NOTE — TELEPHONE ENCOUNTER
Mariangel Soto from Dr Guera Alfaro office received the paperwork    But they need a addendum or note stating pt is cleared for eye surgery  Fax # [phone]

## 2023-10-04 NOTE — TELEPHONE ENCOUNTER
Under media tab there is cardiac clearance form from Dr Radha Ramos office. Can this be printed and faxed.  I addended my note from June 2023 as well

## 2023-10-09 ENCOUNTER — TELEPHONE (OUTPATIENT)
Dept: CARDIOLOGY CLINIC | Age: 58
End: 2023-10-09

## 2023-10-10 ENCOUNTER — OFFICE VISIT (OUTPATIENT)
Dept: FAMILY MEDICINE CLINIC | Age: 58
End: 2023-10-10
Payer: COMMERCIAL

## 2023-10-10 ENCOUNTER — TELEPHONE (OUTPATIENT)
Dept: FAMILY MEDICINE CLINIC | Age: 58
End: 2023-10-10

## 2023-10-10 VITALS
WEIGHT: 166.8 LBS | HEART RATE: 86 BPM | RESPIRATION RATE: 14 BRPM | BODY MASS INDEX: 23.88 KG/M2 | TEMPERATURE: 97.7 F | SYSTOLIC BLOOD PRESSURE: 142 MMHG | OXYGEN SATURATION: 95 % | HEIGHT: 70 IN | DIASTOLIC BLOOD PRESSURE: 82 MMHG

## 2023-10-10 DIAGNOSIS — R06.2 WHEEZING: ICD-10-CM

## 2023-10-10 DIAGNOSIS — E11.22 CONTROLLED TYPE 2 DIABETES MELLITUS WITH CHRONIC KIDNEY DISEASE, WITHOUT LONG-TERM CURRENT USE OF INSULIN, UNSPECIFIED CKD STAGE (HCC): Primary | ICD-10-CM

## 2023-10-10 DIAGNOSIS — J06.9 URI, ACUTE: ICD-10-CM

## 2023-10-10 DIAGNOSIS — I10 ESSENTIAL HYPERTENSION: ICD-10-CM

## 2023-10-10 LAB — HBA1C MFR BLD: 6 % (ref 4.3–5.7)

## 2023-10-10 PROCEDURE — 3077F SYST BP >= 140 MM HG: CPT | Performed by: NURSE PRACTITIONER

## 2023-10-10 PROCEDURE — 4004F PT TOBACCO SCREEN RCVD TLK: CPT | Performed by: NURSE PRACTITIONER

## 2023-10-10 PROCEDURE — 3017F COLORECTAL CA SCREEN DOC REV: CPT | Performed by: NURSE PRACTITIONER

## 2023-10-10 PROCEDURE — G8420 CALC BMI NORM PARAMETERS: HCPCS | Performed by: NURSE PRACTITIONER

## 2023-10-10 PROCEDURE — 3044F HG A1C LEVEL LT 7.0%: CPT | Performed by: NURSE PRACTITIONER

## 2023-10-10 PROCEDURE — 99214 OFFICE O/P EST MOD 30 MIN: CPT | Performed by: NURSE PRACTITIONER

## 2023-10-10 PROCEDURE — G8427 DOCREV CUR MEDS BY ELIG CLIN: HCPCS | Performed by: NURSE PRACTITIONER

## 2023-10-10 PROCEDURE — 2022F DILAT RTA XM EVC RTNOPTHY: CPT | Performed by: NURSE PRACTITIONER

## 2023-10-10 PROCEDURE — 3079F DIAST BP 80-89 MM HG: CPT | Performed by: NURSE PRACTITIONER

## 2023-10-10 PROCEDURE — G8484 FLU IMMUNIZE NO ADMIN: HCPCS | Performed by: NURSE PRACTITIONER

## 2023-10-10 RX ORDER — AZITHROMYCIN 250 MG/1
TABLET, FILM COATED ORAL
Qty: 1 PACKET | Refills: 0 | Status: SHIPPED | OUTPATIENT
Start: 2023-10-10

## 2023-10-10 RX ORDER — ADHESIVE BANDAGE 3/4"
1 BANDAGE TOPICAL DAILY
Qty: 1 EACH | Refills: 0 | Status: SHIPPED | OUTPATIENT
Start: 2023-10-10

## 2023-10-10 RX ORDER — ALBUTEROL SULFATE 90 UG/1
2 AEROSOL, METERED RESPIRATORY (INHALATION) EVERY 6 HOURS PRN
Qty: 1 EACH | Refills: 3 | Status: SHIPPED | OUTPATIENT
Start: 2023-10-10

## 2023-10-10 ASSESSMENT — ENCOUNTER SYMPTOMS
VOMITING: 0
WHEEZING: 1
SHORTNESS OF BREATH: 1
COUGH: 1
NAUSEA: 0

## 2023-10-10 NOTE — TELEPHONE ENCOUNTER
----- Message from DEV Mas CNP sent at 10/10/2023  3:02 PM EDT -----  Can we get records from Dr Nash Wooten' office?  Thank you

## 2023-10-10 NOTE — TELEPHONE ENCOUNTER
Spoke to Obey Rahman at Dr. Cortés Colon office.    She will be faxing records by the end of the day

## 2023-10-10 NOTE — PROGRESS NOTES
and oriented to person, place, and time. Psychiatric:         Mood and Affect: Mood normal.         Behavior: Behavior normal.         Thought Content: Thought content normal.           ASSESSMENT & PLAN  Vasiliy Turcios was seen today for 3 month follow-up and diabetes. Diagnoses and all orders for this visit:    Controlled type 2 diabetes mellitus with chronic kidney disease, without long-term current use of insulin, unspecified CKD stage (HCC)  -     POCT glycosylated hemoglobin (Hb A1C)  -     Continuous Blood Gluc Sensor MISC; 1 each by Does not apply route every 14 days  -     Comprehensive Metabolic Panel; Future  -     CBC with Auto Differential; Future  -     TSH With Reflex Ft4; Future  -     metFORMIN (GLUCOPHAGE) 1000 MG tablet; Take 1 tablet by mouth daily (with breakfast)   Cut Metformin down to once a day, due to low BS   A1C controlled  Wheezing  -     albuterol sulfate HFA (PROVENTIL HFA) 108 (90 Base) MCG/ACT inhaler; Inhale 2 puffs into the lungs every 6 hours as needed for Wheezing  -     azithromycin (ZITHROMAX Z-GLEN) 250 MG tablet; 2 tabs PO x 1 on Day 1, then 1 tab PO q daily on Days 2-5. Essential hypertension  -     Blood Pressure Monitoring (BLOOD PRESSURE CUFF) MISC; 1 each by Does not apply route daily   Restart medications   Get blood work that Dr Elin Bautista ordered. I reprinted requisition  URI, acute  -     azithromycin (ZITHROMAX Z-GLEN) 250 MG tablet; 2 tabs PO x 1 on Day 1, then 1 tab PO q daily on Days 2-5. He will call if medication refills are not at the pharmacy  It was reiterated he need to be on BP pills to control BP and prevent worsening kidney disease  Will get BP cuff and obtain BP readings at home. No follow-ups on file.     Obtain above testing and Follow up with our office 3 months    All copied or forwarded information in the progress note was verified by me to be accurate at the time of visit  Patient's past medical, surgical, social and family history were reviewed and

## 2023-10-13 ENCOUNTER — HOSPITAL ENCOUNTER (OUTPATIENT)
Age: 58
Discharge: HOME | End: 2023-10-13
Payer: COMMERCIAL

## 2023-10-13 DIAGNOSIS — E44.0 PROTEIN-CALORIE MALNUTRITION, MODERATE (HCC): ICD-10-CM

## 2023-10-13 DIAGNOSIS — I10 PRIMARY HYPERTENSION: ICD-10-CM

## 2023-10-13 DIAGNOSIS — D64.9 ANEMIA, UNSPECIFIED TYPE: ICD-10-CM

## 2023-10-13 DIAGNOSIS — N18.32 STAGE 3B CHRONIC KIDNEY DISEASE (HCC): ICD-10-CM

## 2023-10-13 DIAGNOSIS — I10 ESSENTIAL HYPERTENSION: ICD-10-CM

## 2023-10-13 DIAGNOSIS — E78.2 MIXED HYPERLIPIDEMIA: ICD-10-CM

## 2023-10-13 DIAGNOSIS — E11.21 DIABETIC NEPHROPATHY ASSOCIATED WITH TYPE 2 DIABETES MELLITUS (HCC): ICD-10-CM

## 2023-10-13 DIAGNOSIS — E11.22 CONTROLLED TYPE 2 DIABETES MELLITUS WITH CHRONIC KIDNEY DISEASE, WITHOUT LONG-TERM CURRENT USE OF INSULIN, UNSPECIFIED CKD STAGE (HCC): ICD-10-CM

## 2023-10-13 LAB
25(OH)D3 SERPL-MCNC: 29 NG/ML (ref 30–100)
ALBUMIN SERPL BCG-MCNC: 4 G/DL (ref 3.5–5.1)
ALP SERPL-CCNC: 96 U/L (ref 38–126)
ALT SERPL W/O P-5'-P-CCNC: 12 U/L (ref 11–66)
ANION GAP SERPL CALC-SCNC: 14 MEQ/L (ref 8–16)
AST SERPL-CCNC: 13 U/L (ref 5–40)
BASOPHILS ABSOLUTE: 0 THOU/MM3 (ref 0–0.1)
BASOPHILS NFR BLD AUTO: 0.6 %
BILIRUB SERPL-MCNC: 0.2 MG/DL (ref 0.3–1.2)
BUN SERPL-MCNC: 40 MG/DL (ref 7–22)
C3C SERPL-MCNC: 94 MG/DL (ref 90–180)
C4 SERPL-MCNC: 29 MG/DL (ref 10–40)
CALCIUM SERPL-MCNC: 8.9 MG/DL (ref 8.5–10.5)
CHLORIDE SERPL-SCNC: 100 MEQ/L (ref 98–111)
CO2 SERPL-SCNC: 23 MEQ/L (ref 23–33)
CREAT SERPL-MCNC: 3.1 MG/DL (ref 0.4–1.2)
CREAT UR-MCNC: 108 MG/DL
DEPRECATED RDW RBC AUTO: 40.5 FL (ref 35–45)
EOSINOPHIL NFR BLD AUTO: 5.1 %
EOSINOPHILS ABSOLUTE: 0.4 THOU/MM3 (ref 0–0.4)
ERYTHROCYTE [DISTWIDTH] IN BLOOD BY AUTOMATED COUNT: 12.6 % (ref 11.5–14.5)
FERRITIN SERPL IA-MCNC: 189 NG/ML (ref 22–322)
FOLATE SERPL-MCNC: 8.7 NG/ML (ref 4.8–24.2)
GFR SERPL CREATININE-BSD FRML MDRD: 23 ML/MIN/1.73M2
GLUCOSE SERPL-MCNC: 152 MG/DL (ref 70–108)
HCT VFR BLD AUTO: 27.9 % (ref 42–52)
HGB BLD-MCNC: 9.6 GM/DL (ref 14–18)
IMM GRANULOCYTES # BLD AUTO: 0.03 THOU/MM3 (ref 0–0.07)
IMM GRANULOCYTES NFR BLD AUTO: 0.4 %
IRON SERPL-MCNC: 72 UG/DL (ref 65–195)
LYMPHOCYTES ABSOLUTE: 1.3 THOU/MM3 (ref 1–4.8)
LYMPHOCYTES NFR BLD AUTO: 16.3 %
MCH RBC QN AUTO: 30.2 PG (ref 26–33)
MCHC RBC AUTO-ENTMCNC: 34.4 GM/DL (ref 32.2–35.5)
MCV RBC AUTO: 87.7 FL (ref 80–94)
MONOCYTES ABSOLUTE: 0.5 THOU/MM3 (ref 0.4–1.3)
MONOCYTES NFR BLD AUTO: 6 %
NEUTROPHILS NFR BLD AUTO: 71.6 %
NRBC BLD AUTO-RTO: 0 /100 WBC
PLATELET # BLD AUTO: 178 THOU/MM3 (ref 130–400)
PMV BLD AUTO: 11.3 FL (ref 9.4–12.4)
POTASSIUM SERPL-SCNC: 3.6 MEQ/L (ref 3.5–5.2)
PROT SERPL-MCNC: 6.9 G/DL (ref 6.1–8)
PROT UR-MCNC: 253.2 MG/DL
PROT/CREAT 24H UR: 2.34 MG/G{CREAT}
PTH-INTACT SERPL-MCNC: 71.3 PG/ML (ref 15–65)
RBC # BLD AUTO: 3.18 MILL/MM3 (ref 4.7–6.1)
SEGMENTED NEUTROPHILS ABSOLUTE COUNT: 5.7 THOU/MM3 (ref 1.8–7.7)
SODIUM SERPL-SCNC: 137 MEQ/L (ref 135–145)
TIBC SERPL-MCNC: 191 UG/DL (ref 171–450)
TSH SERPL DL<=0.005 MIU/L-ACNC: 0.78 UIU/ML (ref 0.4–4.2)
VIT B12 SERPL-MCNC: 445 PG/ML (ref 211–911)
WBC # BLD AUTO: 8 THOU/MM3 (ref 4.8–10.8)

## 2023-10-13 PROCEDURE — 82607 VITAMIN B-12: CPT

## 2023-10-13 PROCEDURE — 84443 ASSAY THYROID STIM HORMONE: CPT

## 2023-10-13 PROCEDURE — 82746 ASSAY OF FOLIC ACID SERUM: CPT

## 2023-10-13 PROCEDURE — 83540 ASSAY OF IRON: CPT

## 2023-10-13 PROCEDURE — 86160 COMPLEMENT ANTIGEN: CPT

## 2023-10-13 PROCEDURE — 83550 IRON BINDING TEST: CPT

## 2023-10-13 PROCEDURE — 82570 ASSAY OF URINE CREATININE: CPT

## 2023-10-13 PROCEDURE — 84156 ASSAY OF PROTEIN URINE: CPT

## 2023-10-13 PROCEDURE — 36415 COLL VENOUS BLD VENIPUNCTURE: CPT

## 2023-10-13 PROCEDURE — 80053 COMPREHEN METABOLIC PANEL: CPT

## 2023-10-13 PROCEDURE — 82728 ASSAY OF FERRITIN: CPT

## 2023-10-13 PROCEDURE — 83883 ASSAY NEPHELOMETRY NOT SPEC: CPT

## 2023-10-13 PROCEDURE — 85025 COMPLETE CBC W/AUTO DIFF WBC: CPT

## 2023-10-13 PROCEDURE — 83970 ASSAY OF PARATHORMONE: CPT

## 2023-10-13 PROCEDURE — 82306 VITAMIN D 25 HYDROXY: CPT

## 2023-10-15 ENCOUNTER — TELEPHONE (OUTPATIENT)
Dept: FAMILY MEDICINE CLINIC | Age: 58
End: 2023-10-15

## 2023-10-15 DIAGNOSIS — N18.9 CHRONIC KIDNEY DISEASE, UNSPECIFIED CKD STAGE: Primary | ICD-10-CM

## 2023-10-15 LAB — KAPPA/LAMBDA FREE LIGHT CHAINS: NORMAL

## 2023-10-16 ENCOUNTER — TELEPHONE (OUTPATIENT)
Dept: NEPHROLOGY | Age: 58
End: 2023-10-16

## 2023-10-16 ENCOUNTER — TELEPHONE (OUTPATIENT)
Dept: FAMILY MEDICINE CLINIC | Age: 58
End: 2023-10-16

## 2023-10-16 DIAGNOSIS — E11.22 CONTROLLED TYPE 2 DIABETES MELLITUS WITH CHRONIC KIDNEY DISEASE, WITHOUT LONG-TERM CURRENT USE OF INSULIN, UNSPECIFIED CKD STAGE (HCC): ICD-10-CM

## 2023-10-16 NOTE — TELEPHONE ENCOUNTER
----- Message from Albert Costa MD sent at 10/16/2023  7:33 AM EDT -----  Does the  patient have an appointment to see us?

## 2023-10-16 NOTE — TELEPHONE ENCOUNTER
----- Message from Beto Johnson sent at 10/16/2023 10:09 AM EDT -----  Subject: Medication Problem    Medication: escitalopram (LEXAPRO) 10 MG tablet  Dosage: take 1 tablet by mouth once daily  Ordering Provider: beverly    Question/Problem: Patient spouse Urmila Russell would like if clinical staff could   call back in regards of patient medicationDULoxetine (CYMBALTA) 60 MG   extended release capsule , patient spouse has concerns with both   medications combining stated  is not been acting normal       Pharmacy: [address] [place] [address] [id number] - [place], [address] [place] [address], [phone]    ---------------------------------------------------------------------------  --------------  Robert BLAKE  [phone]; OK to leave message on voicemail  ---------------------------------------------------------------------------  --------------    SCRIPT ANSWERS  Relationship to Patient: Spouse/Partner  Representative Name: Urmila Russell  Is the representative on the Communication Release of Information (NEFTALY)   form in Epic: Yes

## 2023-10-16 NOTE — TELEPHONE ENCOUNTER
----- Message from DEV Reynolds CNP sent at 10/15/2023  5:04 PM EDT -----  Pt was notified by provider over the weekend regarding critical creatinine. Will place new orders for patient to repeat labs and please reiterate importance of calling Nephrology for an appt and to start measuring blood pressure at home.

## 2023-10-16 NOTE — TELEPHONE ENCOUNTER
----- Message from DEV Giraldo CNP sent at 10/15/2023  5:19 PM EDT -----  Addressed in another encounter.

## 2023-10-17 NOTE — TELEPHONE ENCOUNTER
Patient is not having any issues with Cymbalta. Is requesting a refill sent to [facility] on [address]. Please advise.

## 2023-10-31 ENCOUNTER — TELEPHONE (OUTPATIENT)
Dept: NEPHROLOGY | Age: 58
End: 2023-10-31

## 2023-10-31 NOTE — TELEPHONE ENCOUNTER
I left a message for Lorrie Puente to come in 11/2/23 at 3:40 but please check the schedule if there is an earlier time available put him there.

## 2023-10-31 NOTE — TELEPHONE ENCOUNTER
Patient's wife called and said they received a call yesterday from patient's PCP instructing him to go to the ER due to critical labs. Patient sent home from the ER and told to follow up with us in one day. His next appointment with you is 12.4.23. Labs from yesterday are in [address]. Please advise. Problem: Lower extremity edema.  Plan: Pt found to have D-dimer of 1943 on admission with b/l LE +1 pitting edema on exam. Non-tender, non-erythematous shins b/l.   - f/u LE Dopplers to r/o DVT.

## 2023-11-02 ENCOUNTER — OFFICE VISIT (OUTPATIENT)
Dept: NEPHROLOGY | Age: 58
End: 2023-11-02
Payer: COMMERCIAL

## 2023-11-02 ENCOUNTER — TELEPHONE (OUTPATIENT)
Dept: FAMILY MEDICINE CLINIC | Age: 58
End: 2023-11-02

## 2023-11-02 VITALS
HEART RATE: 63 BPM | SYSTOLIC BLOOD PRESSURE: 136 MMHG | BODY MASS INDEX: 26.05 KG/M2 | OXYGEN SATURATION: 98 % | HEIGHT: 70 IN | DIASTOLIC BLOOD PRESSURE: 74 MMHG | WEIGHT: 182 LBS

## 2023-11-02 DIAGNOSIS — N18.4 CKD (CHRONIC KIDNEY DISEASE), STAGE IV (HCC): Primary | ICD-10-CM

## 2023-11-02 DIAGNOSIS — N27.0 SMALL LEFT KIDNEY: ICD-10-CM

## 2023-11-02 DIAGNOSIS — D64.9 ANEMIA, UNSPECIFIED TYPE: Primary | ICD-10-CM

## 2023-11-02 DIAGNOSIS — R80.9 PROTEINURIA, UNSPECIFIED TYPE: ICD-10-CM

## 2023-11-02 DIAGNOSIS — D63.8 ANEMIA OF CHRONIC DISEASE: ICD-10-CM

## 2023-11-02 DIAGNOSIS — E87.1 HYPONATREMIA: ICD-10-CM

## 2023-11-02 DIAGNOSIS — E83.42 HYPOMAGNESEMIA: ICD-10-CM

## 2023-11-02 DIAGNOSIS — E83.51 HYPOCALCEMIA: ICD-10-CM

## 2023-11-02 DIAGNOSIS — E87.20 METABOLIC ACIDOSIS: ICD-10-CM

## 2023-11-02 PROCEDURE — 99214 OFFICE O/P EST MOD 30 MIN: CPT | Performed by: INTERNAL MEDICINE

## 2023-11-02 PROCEDURE — G8427 DOCREV CUR MEDS BY ELIG CLIN: HCPCS | Performed by: INTERNAL MEDICINE

## 2023-11-02 PROCEDURE — 3075F SYST BP GE 130 - 139MM HG: CPT | Performed by: INTERNAL MEDICINE

## 2023-11-02 PROCEDURE — G8419 CALC BMI OUT NRM PARAM NOF/U: HCPCS | Performed by: INTERNAL MEDICINE

## 2023-11-02 PROCEDURE — G8484 FLU IMMUNIZE NO ADMIN: HCPCS | Performed by: INTERNAL MEDICINE

## 2023-11-02 PROCEDURE — 3078F DIAST BP <80 MM HG: CPT | Performed by: INTERNAL MEDICINE

## 2023-11-02 PROCEDURE — 4004F PT TOBACCO SCREEN RCVD TLK: CPT | Performed by: INTERNAL MEDICINE

## 2023-11-02 PROCEDURE — 3017F COLORECTAL CA SCREEN DOC REV: CPT | Performed by: INTERNAL MEDICINE

## 2023-11-02 RX ORDER — BRIMONIDINE TARTRATE 2 MG/ML
SOLUTION/ DROPS OPHTHALMIC
COMMUNITY
Start: 2023-10-10

## 2023-11-02 RX ORDER — DORZOLAMIDE HCL 20 MG/ML
SOLUTION/ DROPS OPHTHALMIC
COMMUNITY
Start: 2023-10-10

## 2023-11-02 RX ORDER — LATANOPROST 50 UG/ML
SOLUTION/ DROPS OPHTHALMIC
COMMUNITY
Start: 2023-10-10

## 2023-11-02 RX ORDER — TIMOLOL MALEATE 5 MG/ML
SOLUTION/ DROPS OPHTHALMIC
COMMUNITY
Start: 2023-10-10

## 2023-11-02 NOTE — TELEPHONE ENCOUNTER
Pt's hemoglobin has been running low, around 9.0, since July 2022 and it has not been discussed with the pt. Pt and his wife are concerned with these values. Pt has been fatigued and cold all the time.      Please advise

## 2023-11-02 NOTE — TELEPHONE ENCOUNTER
He could be anemic due to kidney disease or his other chronic conditions. His hemoglobin looks to be running low since 6/2022. But most recently his [address] has dropped down to 9.0  Low iron can cause low hemoglobin, his Iron studies just recently were normal.  Other ways to evaluate for causes of low hemoglobin or blood loss would be to see his GI physician if he hasn't recently. He was seen in 2018 with Dr Gerry Mckee for colonoscopy, and it was recommended he come back in 5 years due to having a polyp. To start with, I would recommend him seeing Dr Gerry Mckee and I can place referral if you would like. I will place orders for him to get blood work done at the same time he gets blood work done for Dr Kiel Flynn.

## 2023-11-02 NOTE — PROGRESS NOTES
30 tablet 5    hydrALAZINE (APRESOLINE) 25 MG tablet Take 1 tablet by mouth 3 times daily 270 tablet 3    amitriptyline (ELAVIL) 25 MG tablet Take 1 tablet by mouth daily 90 tablet 3    amLODIPine (NORVASC) 10 MG tablet take 1 tablet by mouth once daily 90 tablet 3    Continuous Blood Gluc Sensor (FREESTYLE YUMI SENSOR SYSTEM) MISC 1 device every 14 days 6 each 3    metoprolol tartrate (LOPRESSOR) 50 MG tablet take 1 tablet by mouth twice a day 180 tablet 3    aspirin 325 MG EC tablet Take 1 tablet by mouth daily 90 tablet 3    Continuous Blood Gluc  (FREESTYLE YUMI READER) JESU 1 Device by Does not apply route 3 times daily (before meals) 1 each 5    dicyclomine (BENTYL) 20 MG tablet Take 1 tablet by mouth 4 times daily (before meals and nightly) 20 tablet 0    gemfibrozil (LOPID) 600 MG tablet TAKE 1 TABLET BY MOUTH TWICE DAILY 180 tablet 1    glucose monitoring kit (FREESTYLE) monitoring kit 1 kit by Does not apply route daily as needed 1 kit 0    fluticasone-salmeterol (ADVAIR DISKUS) 250-50 MCG/DOSE AEPB Inhale 1 puff into the lungs every 12 hours 3 each 1    Blood Pressure Monitoring (BLOOD PRESSURE CUFF) MISC 1 each by Does not apply route daily (Patient not taking: Reported on 11/2/2023) 1 each 0    ibuprofen (ADVIL;MOTRIN) 600 MG tablet Take 1 tablet by mouth 4 times daily as needed for Pain (Patient not taking: Reported on 11/2/2023) 120 tablet 0    loratadine (CLARITIN) 10 MG tablet Take by mouth (Patient not taking: Reported on 11/2/2023)      pantoprazole (PROTONIX) 40 MG tablet Take 1 tablet by mouth every morning (before breakfast) (Patient not taking: Reported on 11/2/2023) 90 tablet 3    Misc. Devices [facility] [place]) MISC 2 each by Does not apply route daily Anti tip device for wheelchair.  (Patient not taking: Reported on 11/2/2023) 2 each 0    blood glucose monitor kit and supplies Check blood sugar q daily, Dx E11.9 (Patient not taking: Reported on 11/2/2023) 1 kit 0     No current

## 2023-11-07 NOTE — TELEPHONE ENCOUNTER
Pt informed and verbalized understanding.      Pt is ok with referral to Dr Suyapa Canela    Lab orders in epic/pt will go to  [facility]

## 2023-11-23 DIAGNOSIS — E11.65 UNCONTROLLED TYPE 2 DIABETES MELLITUS WITH HYPERGLYCEMIA (HCC): ICD-10-CM

## 2023-11-24 RX ORDER — PRAVASTATIN SODIUM 40 MG
TABLET ORAL
Qty: 90 TABLET | Refills: 3 | Status: SHIPPED | OUTPATIENT
Start: 2023-11-24

## 2023-11-24 NOTE — TELEPHONE ENCOUNTER
Recent Visits  Date Type Provider Dept   10/10/23 Office Visit Shu Marsh, APRN - CNP [facility]   07/03/23 Office Visit Shu Marsh, APRN - CNP [facility]   06/06/23 Office Visit Shu Marsh, APRN - CNP [facility]   04/03/23 Office Visit Mary Kennedy, APRN - CNP [facility]   03/01/23 Office Visit Shu Marsh, APRN - CNP [facility]   10/06/22 Office Visit Mary Kennedy, APRN - CNP [facility]   09/26/22 Office Visit Shu Marsh, APRN - CNP [facility]   06/21/22 Office Visit Shu Marsh, APRN - CNP [facility]   Showing recent visits within past 540 days with a meds authorizing provider and meeting all other requirements  Future Appointments  Date Type Provider Dept   01/11/24 Appointment Shu Marsh, APRN - CNP [facility]   Showing future appointments within next 150 days with a meds authorizing provider and meeting all other requirements

## 2023-11-29 ENCOUNTER — TELEPHONE (OUTPATIENT)
Dept: FAMILY MEDICINE CLINIC | Age: 58
End: 2023-11-29

## 2023-11-29 DIAGNOSIS — Z12.11 COLON CANCER SCREENING: Primary | ICD-10-CM

## 2023-11-29 DIAGNOSIS — N18.4 STAGE 4 CHRONIC KIDNEY DISEASE (HCC): ICD-10-CM

## 2023-11-29 NOTE — TELEPHONE ENCOUNTER
Pt's wife, Mari Medel (on HIPAA), called asking for new referrals'. Delbert Larry for pt's colonoscopy. New referral to nephrology. Chloe stated Dr. Kiel Flynn was \"rude\". Pt's wife is asking for a new referral, out side of [facility] is ok with pt.

## 2023-11-29 NOTE — TELEPHONE ENCOUNTER
I have placed new referral to [facility] nephrology please fax and I have placed referral to Dr Quinonez Hippo

## 2023-12-28 DIAGNOSIS — F32.9 MAJOR DEPRESSIVE DISORDER WITH CURRENT ACTIVE EPISODE, UNSPECIFIED DEPRESSION EPISODE SEVERITY, UNSPECIFIED WHETHER RECURRENT: ICD-10-CM

## 2023-12-28 RX ORDER — SILODOSIN 4 MG/1
CAPSULE ORAL
Qty: 90 CAPSULE | Refills: 0 | Status: SHIPPED | OUTPATIENT
Start: 2023-12-28

## 2023-12-28 NOTE — TELEPHONE ENCOUNTER
Recent Visits  Date Type Provider Dept   11/02/23 Office Visit Lisa Nicholas MD [facility]   10/10/23 Office Visit Fidel Mojica APRN - CNP [facility]   07/03/23 Office Visit Negissell Mojica, APRN - CNP [facility]   07/03/23 Office Visit Lisa Nicholas MD [facility]   06/06/23 Office Visit Fidel Mojica APRN - CNP [facility]   04/03/23 Office Visit DEV Loco - CNP [facility]   03/01/23 Office Visit Fidel Mojica APRN - CNP [facility]   10/06/22 Office Visit DEV Loco - CNP [facility]   09/26/22 Office Visit Ratna Marsh APRN - CNP [facility]   Showing recent visits within past 540 days with a meds authorizing provider and meeting all other requirements  Future Appointments  Date Type Provider Dept   01/11/24 Appointment Fidel Mojica APRN - CNP [facility]   Showing future appointments within next 150 days with a meds authorizing provider and meeting all other requirements

## 2023-12-28 NOTE — TELEPHONE ENCOUNTER
Recent Visits  Date Type Provider Dept   10/10/23 Office Visit Shu Marsh, APRN - CNP [facility]   07/03/23 Office Visit Shu Marsh, APRN - CNP [facility]   06/06/23 Office Visit Shu Marsh, APRN - CNP [facility]   04/03/23 Office Visit Mary Kennedy, APRN - CNP [facility]   03/01/23 Office Visit Shu Marsh, APRN - CNP [facility]   10/06/22 Office Visit Mary Kennedy, APRN - CNP [facility]   09/26/22 Office Visit Shu Marsh, APRN - CNP [facility]   Showing recent visits within past 540 days with a meds authorizing provider and meeting all other requirements  Future Appointments  Date Type Provider Dept   01/11/24 Appointment Shu Marsh, APRN - CNP [facility]   Showing future appointments within next 150 days with a meds authorizing provider and meeting all other requirements

## 2023-12-29 ENCOUNTER — TELEPHONE (OUTPATIENT)
Dept: NEPHROLOGY | Age: 58
End: 2023-12-29

## 2023-12-29 NOTE — TELEPHONE ENCOUNTER
Received a fax from [employer] [place] division of disability determination seeking a response by 12/29/23 for previously requested information.   Pt had an appointment scheduled on 2/5/24 but cancelled it.   Paperwork scanned in media manager

## 2024-01-03 NOTE — TELEPHONE ENCOUNTER
Faxed note from Dr. Adkins below to disability department to inform them we cannot support disability at this time

## 2024-01-09 RX ORDER — ESCITALOPRAM OXALATE 10 MG/1
TABLET ORAL
Qty: 30 TABLET | Refills: 3 | OUTPATIENT
Start: 2024-01-09

## 2024-01-12 DIAGNOSIS — G54.6 PHANTOM PAIN (HCC): ICD-10-CM

## 2024-01-12 DIAGNOSIS — F32.9 MAJOR DEPRESSIVE DISORDER WITH CURRENT ACTIVE EPISODE, UNSPECIFIED DEPRESSION EPISODE SEVERITY, UNSPECIFIED WHETHER RECURRENT: ICD-10-CM

## 2024-01-15 NOTE — TELEPHONE ENCOUNTER
Recent Visits  Date Type Provider Dept   10/10/23 Office Visit Shu Marsh, APRN - CNP [facility]   07/03/23 Office Visit Shu Marsh, APRN - CNP [facility]   06/06/23 Office Visit Shu Marsh, APRN - CNP [facility]   04/03/23 Office Visit Mary Kennedy, APRN - CNP [facility]   03/01/23 Office Visit Shu Marsh, APRN - CNP [facility]   10/06/22 Office Visit Mary Kennedy, APRN - CNP [facility]   09/26/22 Office Visit Shu Marsh, APRN - CNP [facility]   Showing recent visits within past 540 days with a meds authorizing provider and meeting all other requirements  Future Appointments  No visits were found meeting these conditions.  Showing future appointments within next 150 days with a meds authorizing provider and meeting all other requirements

## 2024-01-16 PROBLEM — N18.9 CKD (CHRONIC KIDNEY DISEASE): Status: ACTIVE | Noted: 2024-01-16

## 2024-01-16 RX ORDER — GABAPENTIN 400 MG/1
400 CAPSULE ORAL 2 TIMES DAILY
Qty: 60 CAPSULE | Refills: 5 | Status: SHIPPED | OUTPATIENT
Start: 2024-01-16 | End: 2024-02-15

## 2024-01-16 RX ORDER — ESCITALOPRAM OXALATE 10 MG/1
TABLET ORAL
Qty: 30 TABLET | Refills: 3 | Status: SHIPPED | OUTPATIENT
Start: 2024-01-16

## 2024-01-16 NOTE — TELEPHONE ENCOUNTER
Spoke to patient, he was informed and understood with no further questions at this time.    Pt stated he would get blood work done.    Has not had his eye surgery with Dr. Sosa yet    I have him on the schedule for this Thursday at 11:00 am for 40 minutes.

## 2024-01-16 NOTE — TELEPHONE ENCOUNTER
Can we please reach out to patient to schedule diabetes follow up and remind him to get blood work done.  Schedule 40 min appt.  Also did he have his eye surgery by Dr Sosa?  I will send refills on medications gabapentin and lexapro, however due to his kidney function I have to reduce his gabapentin to 400 mg twice a day.     Medication:  Gabapentin  O[facility] Reviewed On:  01/16/24  Prescribing Physician:  DEV Dodge CNP    Electronically signed by DEV Dodge CNP on 1/16/2024 at 9:59 AM

## 2024-01-30 DIAGNOSIS — R06.2 WHEEZING: ICD-10-CM

## 2024-01-30 RX ORDER — ALBUTEROL SULFATE 90 UG/1
AEROSOL, METERED RESPIRATORY (INHALATION)
Qty: 8.5 G | Refills: 4 | Status: SHIPPED | OUTPATIENT
Start: 2024-01-30

## 2024-02-22 DIAGNOSIS — E11.65 UNCONTROLLED TYPE 2 DIABETES MELLITUS WITH HYPERGLYCEMIA (HCC): ICD-10-CM

## 2024-02-23 RX ORDER — ASPIRIN 325 MG
325 TABLET, DELAYED RELEASE (ENTERIC COATED) ORAL DAILY
Qty: 90 TABLET | Refills: 3 | Status: SHIPPED | OUTPATIENT
Start: 2024-02-23

## 2024-03-05 DIAGNOSIS — F32.9 MAJOR DEPRESSIVE DISORDER WITH CURRENT ACTIVE EPISODE, UNSPECIFIED DEPRESSION EPISODE SEVERITY, UNSPECIFIED WHETHER RECURRENT: ICD-10-CM

## 2024-03-05 DIAGNOSIS — I10 BENIGN ESSENTIAL HTN: ICD-10-CM

## 2024-03-05 DIAGNOSIS — K21.9 GASTROESOPHAGEAL REFLUX DISEASE WITHOUT ESOPHAGITIS: ICD-10-CM

## 2024-03-06 RX ORDER — AMITRIPTYLINE HYDROCHLORIDE 25 MG/1
25 TABLET, FILM COATED ORAL DAILY
Qty: 90 TABLET | Refills: 3 | Status: SHIPPED | OUTPATIENT
Start: 2024-03-06

## 2024-03-06 RX ORDER — METOPROLOL TARTRATE 50 MG/1
TABLET, FILM COATED ORAL
Qty: 180 TABLET | Refills: 3 | Status: SHIPPED | OUTPATIENT
Start: 2024-03-06

## 2024-03-06 RX ORDER — AMLODIPINE BESYLATE 10 MG/1
TABLET ORAL
Qty: 90 TABLET | Refills: 3 | Status: SHIPPED | OUTPATIENT
Start: 2024-03-06

## 2024-03-06 RX ORDER — PANTOPRAZOLE SODIUM 40 MG/1
40 TABLET, DELAYED RELEASE ORAL
Qty: 90 TABLET | Refills: 3 | Status: SHIPPED | OUTPATIENT
Start: 2024-03-06

## 2024-04-02 RX ORDER — SILODOSIN 4 MG/1
CAPSULE ORAL
Qty: 90 CAPSULE | Refills: 0 | Status: SHIPPED | OUTPATIENT
Start: 2024-04-02

## 2024-05-08 DIAGNOSIS — F32.9 MAJOR DEPRESSIVE DISORDER WITH CURRENT ACTIVE EPISODE, UNSPECIFIED DEPRESSION EPISODE SEVERITY, UNSPECIFIED WHETHER RECURRENT: ICD-10-CM

## 2024-05-08 RX ORDER — DULOXETIN HYDROCHLORIDE 60 MG/1
CAPSULE, DELAYED RELEASE ORAL
Qty: 90 CAPSULE | Refills: 3 | Status: SHIPPED | OUTPATIENT
Start: 2024-05-08

## 2024-05-08 NOTE — TELEPHONE ENCOUNTER
No future appointments.   Recent Visits  Date Type Provider Dept   10/10/23 Office Visit Shu Marsh, APRN - CNP [facility]   07/03/23 Office Visit Shu Marsh, APRN - CNP [facility]   06/06/23 Office Visit Shu Marsh, APRN - CNP [facility]   04/03/23 Office Visit Mary Kennedy, APRN - CNP [facility]   03/01/23 Office Visit Shu Marsh, APRN - CNP [facility]   Showing recent visits within past 540 days with a meds authorizing provider and meeting all other requirements  Future Appointments  No visits were found meeting these conditions.  Showing future appointments within next 150 days with a meds authorizing provider and meeting all other requirements

## 2024-06-03 DIAGNOSIS — R06.2 WHEEZING: ICD-10-CM

## 2024-06-03 RX ORDER — ALBUTEROL SULFATE 90 UG/1
AEROSOL, METERED RESPIRATORY (INHALATION)
Qty: 8.5 G | Refills: 4 | Status: SHIPPED | OUTPATIENT
Start: 2024-06-03

## 2024-07-18 PROBLEM — E87.20 METABOLIC ACIDOSIS: Status: ACTIVE | Noted: 2024-07-18

## 2024-07-18 PROBLEM — Z86.39 HISTORY OF DIABETES MELLITUS: Status: ACTIVE | Noted: 2024-07-18

## 2024-10-11 ENCOUNTER — TELEPHONE (OUTPATIENT)
Dept: FAMILY MEDICINE CLINIC | Age: 59
End: 2024-10-11

## 2024-10-11 ENCOUNTER — OFFICE VISIT (OUTPATIENT)
Dept: FAMILY MEDICINE CLINIC | Age: 59
End: 2024-10-11

## 2024-10-11 VITALS
BODY MASS INDEX: 24.71 KG/M2 | DIASTOLIC BLOOD PRESSURE: 76 MMHG | RESPIRATION RATE: 14 BRPM | HEART RATE: 90 BPM | TEMPERATURE: 98.9 F | SYSTOLIC BLOOD PRESSURE: 138 MMHG | HEIGHT: 70 IN | OXYGEN SATURATION: 98 % | WEIGHT: 172.6 LBS

## 2024-10-11 DIAGNOSIS — J44.89 OTHER SPECIFIED CHRONIC OBSTRUCTIVE PULMONARY DISEASE (HCC): ICD-10-CM

## 2024-10-11 DIAGNOSIS — Z89.511 S/P BELOW KNEE AMPUTATION, RIGHT (HCC): ICD-10-CM

## 2024-10-11 DIAGNOSIS — Z12.11 COLON CANCER SCREENING: ICD-10-CM

## 2024-10-11 DIAGNOSIS — F32.9 MAJOR DEPRESSIVE DISORDER WITH CURRENT ACTIVE EPISODE, UNSPECIFIED DEPRESSION EPISODE SEVERITY, UNSPECIFIED WHETHER RECURRENT: ICD-10-CM

## 2024-10-11 DIAGNOSIS — R52 PHANTOM PAIN: ICD-10-CM

## 2024-10-11 DIAGNOSIS — I10 BENIGN ESSENTIAL HTN: ICD-10-CM

## 2024-10-11 DIAGNOSIS — H40.9 GLAUCOMA OF RIGHT EYE, UNSPECIFIED GLAUCOMA TYPE: ICD-10-CM

## 2024-10-11 DIAGNOSIS — E78.5 DYSLIPIDEMIA: Primary | ICD-10-CM

## 2024-10-11 DIAGNOSIS — E11.22 CONTROLLED TYPE 2 DIABETES MELLITUS WITH CHRONIC KIDNEY DISEASE, WITHOUT LONG-TERM CURRENT USE OF INSULIN, UNSPECIFIED CKD STAGE (HCC): ICD-10-CM

## 2024-10-11 DIAGNOSIS — N18.4 CKD (CHRONIC KIDNEY DISEASE), STAGE IV (HCC): ICD-10-CM

## 2024-10-11 DIAGNOSIS — I10 ESSENTIAL HYPERTENSION: ICD-10-CM

## 2024-10-11 DIAGNOSIS — R06.2 WHEEZING: ICD-10-CM

## 2024-10-11 DIAGNOSIS — G54.8 PHANTOM PAIN: ICD-10-CM

## 2024-10-11 DIAGNOSIS — K21.9 GASTROESOPHAGEAL REFLUX DISEASE WITHOUT ESOPHAGITIS: ICD-10-CM

## 2024-10-11 PROBLEM — H40.051: Status: ACTIVE | Noted: 2024-06-11

## 2024-10-11 PROBLEM — E11.621 ULCER OF FOOT DUE TO TYPE 2 DIABETES MELLITUS (HCC): Status: ACTIVE | Noted: 2024-08-08

## 2024-10-11 PROBLEM — H43.10 VITREOUS HEMORRHAGE (HCC): Status: ACTIVE | Noted: 2023-05-11

## 2024-10-11 PROBLEM — L97.509 ULCER OF FOOT DUE TO TYPE 2 DIABETES MELLITUS (HCC): Status: ACTIVE | Noted: 2024-08-08

## 2024-10-11 LAB — HBA1C MFR BLD: 6.3 % (ref 4.3–5.7)

## 2024-10-11 RX ORDER — DULOXETIN HYDROCHLORIDE 60 MG/1
CAPSULE, DELAYED RELEASE ORAL
Qty: 90 CAPSULE | Refills: 3 | Status: SHIPPED | OUTPATIENT
Start: 2024-10-11

## 2024-10-11 RX ORDER — HYDRALAZINE HYDROCHLORIDE 25 MG/1
25 TABLET, FILM COATED ORAL 3 TIMES DAILY
Qty: 270 TABLET | Refills: 3 | Status: SHIPPED | OUTPATIENT
Start: 2024-10-11

## 2024-10-11 RX ORDER — PRAVASTATIN SODIUM 40 MG
40 TABLET ORAL DAILY
Qty: 90 TABLET | Refills: 3 | Status: SHIPPED | OUTPATIENT
Start: 2024-10-11

## 2024-10-11 RX ORDER — AMLODIPINE BESYLATE 10 MG/1
TABLET ORAL
Qty: 90 TABLET | Refills: 3 | Status: SHIPPED | OUTPATIENT
Start: 2024-10-11

## 2024-10-11 RX ORDER — GABAPENTIN 400 MG/1
400 CAPSULE ORAL 2 TIMES DAILY
Qty: 60 CAPSULE | Refills: 5 | Status: SHIPPED | OUTPATIENT
Start: 2024-10-11 | End: 2024-11-10

## 2024-10-11 RX ORDER — GEMFIBROZIL 600 MG/1
TABLET, FILM COATED ORAL
Qty: 180 TABLET | Refills: 1 | Status: SHIPPED | OUTPATIENT
Start: 2024-10-11

## 2024-10-11 RX ORDER — PANTOPRAZOLE SODIUM 40 MG/1
40 TABLET, DELAYED RELEASE ORAL
Qty: 90 TABLET | Refills: 3 | Status: SHIPPED | OUTPATIENT
Start: 2024-10-11

## 2024-10-11 RX ORDER — ALBUTEROL SULFATE 90 UG/1
2 INHALANT RESPIRATORY (INHALATION) EVERY 4 HOURS PRN
Qty: 8.5 G | Refills: 5 | Status: SHIPPED | OUTPATIENT
Start: 2024-10-11

## 2024-10-11 ASSESSMENT — PATIENT HEALTH QUESTIONNAIRE - PHQ9
6. FEELING BAD ABOUT YOURSELF - OR THAT YOU ARE A FAILURE OR HAVE LET YOURSELF OR YOUR FAMILY DOWN: NOT AT ALL
2. FEELING DOWN, DEPRESSED OR HOPELESS: NOT AT ALL
SUM OF ALL RESPONSES TO PHQ QUESTIONS 1-9: 1
7. TROUBLE CONCENTRATING ON THINGS, SUCH AS READING THE NEWSPAPER OR WATCHING TELEVISION: NOT AT ALL
8. MOVING OR SPEAKING SO SLOWLY THAT OTHER PEOPLE COULD HAVE NOTICED. OR THE OPPOSITE, BEING SO FIGETY OR RESTLESS THAT YOU HAVE BEEN MOVING AROUND A LOT MORE THAN USUAL: NOT AT ALL
10. IF YOU CHECKED OFF ANY PROBLEMS, HOW DIFFICULT HAVE THESE PROBLEMS MADE IT FOR YOU TO DO YOUR WORK, TAKE CARE OF THINGS AT HOME, OR GET ALONG WITH OTHER PEOPLE: NOT DIFFICULT AT ALL
4. FEELING TIRED OR HAVING LITTLE ENERGY: SEVERAL DAYS
SUM OF ALL RESPONSES TO PHQ QUESTIONS 1-9: 1
SUM OF ALL RESPONSES TO PHQ QUESTIONS 1-9: 1
9. THOUGHTS THAT YOU WOULD BE BETTER OFF DEAD, OR OF HURTING YOURSELF: NOT AT ALL
SUM OF ALL RESPONSES TO PHQ QUESTIONS 1-9: 1
3. TROUBLE FALLING OR STAYING ASLEEP: NOT AT ALL
1. LITTLE INTEREST OR PLEASURE IN DOING THINGS: NOT AT ALL
5. POOR APPETITE OR OVEREATING: NOT AT ALL
SUM OF ALL RESPONSES TO PHQ9 QUESTIONS 1 & 2: 0

## 2024-10-11 NOTE — TELEPHONE ENCOUNTER
Spoke to Henrietta and she requested that we send a paper requesting records.  Faxed over request asking for records

## 2024-10-11 NOTE — PROGRESS NOTES
bilaterally.   ABDOMEN: Soft, nontender, nondistended. Positive bowel sounds.   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 Phantom pain  controlled  - gabapentin (NEURONTIN) 400 MG capsule; Take 1 capsule by mouth in the morning and at bedtime for 30 days.  Dispense: 60 capsule; Refill: 5  - DME Order for Prothesis as OP    2. Benign essential HTN  Controlled today  Out of meds for 3 weeks, monitor at home after restarting medications  Get records from Nephrology  - Comprehensive Metabolic Panel; Future  - CBC with Auto Differential; Future  - Lipid, Fasting; Future  - TSH With Reflex Ft4; Future  - amLODIPine (NORVASC) 10 MG tablet; take 1 tablet by mouth once daily  Dispense: 90 tablet; Refill: 3    3. Controlled type 2 diabetes mellitus with chronic kidney disease, without long-term current use of insulin, unspecified CKD stage (HCC)  Currently not on medication  A1C controlled  - Microalbumin / Creatinine Urine Ratio; Future    4. Other specified chronic obstructive pulmonary disease (HCC)  Due for CT lung screening   - albuterol sulfate HFA (PROVENTIL;VENTOLIN;PROAIR) 108 (90 Base) MCG/ACT inhaler; Inhale 2 puffs into the lungs every 4 hours as needed for Wheezing inhale 2 puffs by mouth every 6 hours if needed for wheezing  Dispense: 8.5 g; Refill: 5    5. CKD (chronic kidney disease), stage IV (HCC)  Fu with nephrologist  - Comprehensive Metabolic Panel; Future  - CBC with Auto Differential; Future  - Lipid,

## 2024-10-11 NOTE — TELEPHONE ENCOUNTER
----- Message from DEV Montilla CNP sent at 10/11/2024 10:05 AM EDT -----  Can we get records from Dr Garza at [facility] office  Thank you

## 2024-11-15 LAB
A/G RATIO: 1.2 (ref 1.5–2.5)
ALBUMIN: 4 G/DL (ref 3.5–5)
ALP BLD-CCNC: 103 IU/L (ref 41–137)
ALT SERPL-CCNC: 8 IU/L (ref 10–40)
ANION GAP SERPL CALCULATED.3IONS-SCNC: 9 MMOL/L (ref 4–12)
AST SERPL-CCNC: 11 IU/L (ref 15–41)
BASOPHILS ABSOLUTE: 100 /CMM (ref 0–200)
BASOPHILS RELATIVE PERCENT: 0.7 % (ref 0–2)
BILIRUB SERPL-MCNC: 0.3 MG/DL (ref 0.2–1)
BUN BLDV-MCNC: 40 MG/DL (ref 7–20)
CALCIUM SERPL-MCNC: 9.1 MG/DL (ref 8.8–10.5)
CHLORIDE BLD-SCNC: 104 MEQ/L (ref 101–111)
CHOLESTEROL, TOTAL: 222 MG/DL
CHOLESTEROL/HDL RELATIVE RISK: 8.5 (ref 4–5)
CO2: 24 MEQ/L (ref 21–32)
CREAT SERPL-MCNC: 4.46 MG/DL (ref 0.6–1.3)
CREATININE CLEARANCE: 14
CREATININE, RANDOM URINE: 83.1 MG/DL
DIRECT-LDL / HDL RISK: 5.1
EOSINOPHILS ABSOLUTE: 600 /CMM (ref 0–500)
EOSINOPHILS RELATIVE PERCENT: 5.1 % (ref 0–6)
GLUCOSE: 148 MG/DL (ref 70–110)
HCT VFR BLD CALC: 29.9 % (ref 40–49)
HDLC SERPL-MCNC: 26 MG/DL
HEMOGLOBIN: 10.4 GM/DL (ref 13.5–16.5)
LDL CHOLESTEROL DIRECT: 135 MG/DL
LYMPHOCYTES ABSOLUTE: 1900 /CMM (ref 1000–4800)
LYMPHOCYTES RELATIVE PERCENT: 15.1 % (ref 15–45)
MCH RBC QN AUTO: 30.1 PG (ref 27.5–33)
MCHC RBC AUTO-ENTMCNC: 34.8 GM/DL (ref 33–36)
MCV RBC AUTO: 86.5 CU MIC (ref 80–97)
MICROALBUMIN/CREAT 24H UR: 2525 MG/L
MICROALBUMIN/CREAT UR-RTO: 3037.6 MG/GM (ref 0–30)
MONOCYTES ABSOLUTE: 900 /CMM (ref 0–800)
MONOCYTES RELATIVE PERCENT: 7.1 % (ref 2–10)
NEUTROPHILS ABSOLUTE: 8900 /CMM (ref 1800–7700)
NEUTROPHILS RELATIVE PERCENT: 72 % (ref 40–70)
NUCLEATED RBCS: 0 /100 WBC
PDW BLD-RTO: 13.4 % (ref 12–16)
PLATELET # BLD: 172 TH/CMM (ref 150–400)
POTASSIUM SERPL-SCNC: 3.8 MEQ/L (ref 3.6–5)
RBC # BLD: 3.46 MIL/CMM (ref 4.5–6)
SODIUM BLD-SCNC: 137 MEQ/L (ref 135–145)
TOTAL PROTEIN: 7.3 G/DL (ref 6.2–8)
TRIGL SERPL-MCNC: 357 MG/DL
TSH REFLEX: 0.97 MCIU/ML (ref 0.49–4.67)
VLDLC SERPL CALC-MCNC: 71 MG/DL
WBC # BLD: 12.4 TH/CMM (ref 4.4–10.5)

## 2024-11-18 ENCOUNTER — TELEPHONE (OUTPATIENT)
Dept: FAMILY MEDICINE CLINIC | Age: 59
End: 2024-11-18

## 2024-11-18 DIAGNOSIS — I10 BENIGN ESSENTIAL HTN: Primary | ICD-10-CM

## 2024-11-18 DIAGNOSIS — G54.8 PHANTOM PAIN: ICD-10-CM

## 2024-11-18 DIAGNOSIS — N18.4 CKD (CHRONIC KIDNEY DISEASE), STAGE IV (HCC): ICD-10-CM

## 2024-11-18 DIAGNOSIS — R52 PHANTOM PAIN: ICD-10-CM

## 2024-11-18 NOTE — TELEPHONE ENCOUNTER
Referral to Dr Adkins placed that's who he was seeing here at [facility].   Is he eating and drinking ok?

## 2024-11-18 NOTE — TELEPHONE ENCOUNTER
----- Message from DEV Montilla CNP sent at 11/15/2024  9:50 PM EST -----  Thyroid is normal, liver enzymes ok  Cholesterol is elevated along with triglycerides, confirm he is taking Pravastatin  His kidney function has worsened and he needs fu with NEphrology asap. If he has seen recently, can we get records?

## 2024-11-18 NOTE — TELEPHONE ENCOUNTER
I called and spoke to Yaakov and he verbalized understanding and states that he has not been seeing anyone in nephrology as he was see Dr Nora Garza at [facility] and he did not like her. Pt states that he wants to someone else. Pt would like new referral to [facility] and pt states that he is taking his pravastatin.

## 2024-11-20 RX ORDER — GABAPENTIN 400 MG/1
400 CAPSULE ORAL DAILY
Qty: 30 CAPSULE | Refills: 5
Start: 2024-11-20 | End: 2024-12-20

## 2024-11-20 NOTE — TELEPHONE ENCOUNTER
Please let pt know due to his kidney function we should wean off of his Cymbalta or duloxetine. Recommend doing every other day for a week, then stop.   Also recommended to back off of his Gabapentin dose. Recommend doing only 1 pill daily and not twice a day.   Recheck BMP next week. Will place orders

## 2025-01-05 DIAGNOSIS — R52 PHANTOM PAIN: ICD-10-CM

## 2025-01-05 DIAGNOSIS — G54.8 PHANTOM PAIN: ICD-10-CM

## 2025-01-06 RX ORDER — GABAPENTIN 400 MG/1
400 CAPSULE ORAL 2 TIMES DAILY
Qty: 60 CAPSULE | Refills: 1 | Status: SHIPPED | OUTPATIENT
Start: 2025-01-06 | End: 2025-03-07

## 2025-01-06 NOTE — TELEPHONE ENCOUNTER
Recent Visits  Date Type Provider Dept   10/11/24 Office Visit Shu Marsh APRN - CNP [facility]   10/10/23 Office Visit Shu Marsh APRN - CNP [facility]   Showing recent visits within past 540 days with a meds authorizing provider and meeting all other requirements  Future Appointments  Date Type Provider Dept   01/13/25 Appointment Shu Marsh APRN - CNP [facility]   Showing future appointments within next 150 days with a meds authorizing provider and meeting all other requirements

## 2025-01-13 NOTE — PROGRESS NOTES
Last office visit: 7/3/24    Next office visit: 2/17/25    Last refill: 4/25/24    Last lab: 7/3/24    
patient may proceed with planned surgery. Patient has a Bajwa Cardiac Risk Index score of 0 indicating 3.9% chance of perioperative cardiac complications. he will require standard VTE prophylaxis. Http://[email or web address] [email or web address]. [email or web address]  [email or web address] [email or web address]_[email or web address]

## 2025-03-25 NOTE — CARE COORDINATION
Case Management Assessment  Initial Evaluation    Date/Time of Evaluation: 1/16/2023 1:38 PM  Assessment Completed by: Roseline Lee RN    If patient is discharged prior to next notation, then this note serves as note for discharge by case management.    Patient Name: Yaakov Kemp                   YOB: 1965  Diagnosis: BASIL (acute kidney injury) (HCC) [N17.9]  Uncontrolled hypertension [I10]  Depression with suicidal ideation [F32.A, R45.851]  Uncontrolled type 2 diabetes mellitus with hyperglycemia (HCC) [E11.65]  Elevated blood pressure reading with diagnosis of hypertension [I10]                   Date / Time: 1/15/2023 12:29 AM  Location: [facility]04/004-     Patient Admission Status: Inpatient   Readmission Risk (Low < 19, Mod (19-27), High > 27): Readmission Risk Score: 11.9    Current PCP: Quique Craft, DO  PCP verified by CM? Yes    Chart Reviewed: Yes      History Provided by: Patient  Patient Orientation: Alert and Oriented, Person, Place, Situation, Self    Patient Cognition: Alert    Hospitalization in the last 30 days (Readmission):  No    If yes, Readmission Assessment in CM Navigator will be completed.    Advance Directives:      Code Status: Full Code   Patient's Primary Decision Maker is: Legal Next of Kin    Primary Decision Maker: Leightonkirstie Nadia GILLIAN - Spouse - [phone]    Discharge Planning:    Patient lives with: Family Members Type of Home: House  Primary Care Giver: Self  Patient Support Systems include: Children   Current Financial resources: Medicaid (Secondary through [employer])  Current community resources: None  Current services prior to admission: None            Current DME:              Type of Home Care services:       ADLS  Prior functional level: Independent in ADLs/IADLs  Current functional level: Independent in ADLs/IADLs    Family can provide assistance at DC: Yes  Would you like Case Management to discuss the discharge plan with any other family  members/significant others, and if so, who? No  Plans to Return to Present Housing: Yes  Other Identified Issues/Barriers to RETURNING to current housing: None  Potential Assistance needed at discharge: N/A            Potential DME:    Patient expects to discharge to: [facility] for transportation at discharge: Family    Financial    Payor: Shreyas Cevallos / Plan: Jair Doan / Product Type: *No Product type* /     Does insurance require precert for SNF: Yes    Potential assistance Purchasing Medications: No  Meds-to-Beds request: Yes      [address], [address]  [address]  Phone: [phone] Fax: [phone]    [address] [place] [address] [id number] [place] [address]  [address] [address]  Phone: [phone] Fax: [phone]      Notes:    Factors facilitating achievement of predicted outcomes: Family support, Cooperative, and Pleasant    Barriers to discharge: Medical stability. Additional Case Management Notes: Patient presents due to suicidal ideation, plans to overdose on pills. Per chart, patient was [facility] by [facility]. Psychiatry evaluation complete, they have signed off. IV fluids, Lovenox, ISS, prn Tylenol, Apresoline and Zofran,  daily BMP, carb choice diet, daily weights, strict I & O. Procedure: N/A    The Plan for Transition of Care is related to the following treatment goals of BASIL (acute kidney injury) ([facility] [address] [N17.9]  Uncontrolled hypertension [I10]  Depression with suicidal ideation [F32. A, R45.851]  Uncontrolled type 2 diabetes mellitus with hyperglycemia ([facility] [E11.65]  Elevated blood pressure reading with diagnosis of hypertension [I10]    Patient Goals/Plan/Treatment Preferences: Met with Tito Burrell. He resides with his son. Insurance and PCP verified. He is able to afford his medications and denies a need for DME.  He son will drive him home at discharge and he denies a need for services. Transportation/Food Security/Housekeeping Addressed: No issues identified.      Wero Gregorio RN  Case Management Department No

## 2025-05-14 DIAGNOSIS — G54.8 PHANTOM PAIN: ICD-10-CM

## 2025-05-14 DIAGNOSIS — R52 PHANTOM PAIN: ICD-10-CM

## 2025-05-14 RX ORDER — GABAPENTIN 400 MG/1
400 CAPSULE ORAL 2 TIMES DAILY
Qty: 60 CAPSULE | Refills: 1 | OUTPATIENT
Start: 2025-05-14

## 2025-06-15 ENCOUNTER — HOSPITAL ENCOUNTER (INPATIENT)
Age: 60
LOS: 5 days | Discharge: ANOTHER HOSPITAL | End: 2025-06-21
Attending: EMERGENCY MEDICINE | Admitting: INTERNAL MEDICINE
Payer: COMMERCIAL

## 2025-06-15 ENCOUNTER — APPOINTMENT (OUTPATIENT)
Dept: GENERAL RADIOLOGY | Age: 60
End: 2025-06-15
Payer: COMMERCIAL

## 2025-06-15 DIAGNOSIS — N18.6 ESRD (END STAGE RENAL DISEASE) (HCC): ICD-10-CM

## 2025-06-15 DIAGNOSIS — R07.9 CHEST PAIN, UNSPECIFIED TYPE: Primary | ICD-10-CM

## 2025-06-15 DIAGNOSIS — I42.9 CARDIOMYOPATHY, UNSPECIFIED TYPE (HCC): ICD-10-CM

## 2025-06-15 PROCEDURE — 80053 COMPREHEN METABOLIC PANEL: CPT

## 2025-06-15 PROCEDURE — 85025 COMPLETE CBC W/AUTO DIFF WBC: CPT

## 2025-06-15 PROCEDURE — 36415 COLL VENOUS BLD VENIPUNCTURE: CPT

## 2025-06-15 PROCEDURE — 99285 EMERGENCY DEPT VISIT HI MDM: CPT

## 2025-06-15 PROCEDURE — 84484 ASSAY OF TROPONIN QUANT: CPT

## 2025-06-15 PROCEDURE — 86140 C-REACTIVE PROTEIN: CPT

## 2025-06-15 PROCEDURE — 82248 BILIRUBIN DIRECT: CPT

## 2025-06-15 PROCEDURE — 93005 ELECTROCARDIOGRAM TRACING: CPT

## 2025-06-15 PROCEDURE — 71045 X-RAY EXAM CHEST 1 VIEW: CPT

## 2025-06-15 PROCEDURE — 83690 ASSAY OF LIPASE: CPT

## 2025-06-15 PROCEDURE — 85651 RBC SED RATE NONAUTOMATED: CPT

## 2025-06-15 PROCEDURE — 83735 ASSAY OF MAGNESIUM: CPT

## 2025-06-15 PROCEDURE — 83880 ASSAY OF NATRIURETIC PEPTIDE: CPT

## 2025-06-15 ASSESSMENT — PAIN - FUNCTIONAL ASSESSMENT: PAIN_FUNCTIONAL_ASSESSMENT: NONE - DENIES PAIN

## 2025-06-16 ENCOUNTER — RESULTS FOLLOW-UP (OUTPATIENT)
Age: 60
End: 2025-06-16

## 2025-06-16 ENCOUNTER — APPOINTMENT (OUTPATIENT)
Dept: CT IMAGING | Age: 60
End: 2025-06-16
Payer: COMMERCIAL

## 2025-06-16 ENCOUNTER — APPOINTMENT (OUTPATIENT)
Age: 60
End: 2025-06-16
Payer: COMMERCIAL

## 2025-06-16 PROBLEM — I42.9 CARDIOMYOPATHY (HCC): Status: ACTIVE | Noted: 2025-06-16

## 2025-06-16 PROBLEM — R07.9 CHEST PAIN: Status: ACTIVE | Noted: 2025-06-16

## 2025-06-16 PROBLEM — N18.6 ESRD (END STAGE RENAL DISEASE) (HCC): Status: ACTIVE | Noted: 2025-06-16

## 2025-06-16 LAB
ALBUMIN SERPL BCG-MCNC: 3.3 G/DL (ref 3.4–4.9)
ALP SERPL-CCNC: 110 U/L (ref 40–129)
ALT SERPL W/O P-5'-P-CCNC: 41 U/L (ref 10–50)
ANION GAP SERPL CALC-SCNC: 16 MEQ/L (ref 8–16)
ANION GAP SERPL CALC-SCNC: 17 MEQ/L (ref 8–16)
APTT PPP: 27.3 SECONDS (ref 22–38)
AST SERPL-CCNC: 14 U/L (ref 10–50)
BASOPHILS ABSOLUTE: 0.1 THOU/MM3 (ref 0–0.1)
BASOPHILS NFR BLD AUTO: 0.6 %
BILIRUB CONJ SERPL-MCNC: 0.4 MG/DL (ref 0–0.2)
BILIRUB SERPL-MCNC: 0.5 MG/DL (ref 0.3–1.2)
BUN SERPL-MCNC: 48 MG/DL (ref 8–23)
BUN SERPL-MCNC: 49 MG/DL (ref 8–23)
CALCIUM SERPL-MCNC: 8.1 MG/DL (ref 8.6–10)
CALCIUM SERPL-MCNC: 8.2 MG/DL (ref 8.6–10)
CHLORIDE SERPL-SCNC: 89 MEQ/L (ref 98–111)
CHLORIDE SERPL-SCNC: 91 MEQ/L (ref 98–111)
CO2 SERPL-SCNC: 24 MEQ/L (ref 22–29)
CO2 SERPL-SCNC: 24 MEQ/L (ref 22–29)
CREAT SERPL-MCNC: 7.4 MG/DL (ref 0.7–1.2)
CREAT SERPL-MCNC: 7.5 MG/DL (ref 0.7–1.2)
CRP SERPL-MCNC: 10.3 MG/DL (ref 0–0.5)
DEPRECATED MEAN GLUCOSE BLD GHB EST-ACNC: 105 MG/DL (ref 70–126)
DEPRECATED RDW RBC AUTO: 62.8 FL (ref 35–45)
DEPRECATED RDW RBC AUTO: 63.5 FL (ref 35–45)
ECHO AO ASC DIAM: 3.3 CM
ECHO AO ASCENDING AORTA INDEX: 1.61 CM/M2
ECHO AV AREA PEAK VELOCITY: 1.3 CM2
ECHO AV AREA VTI: 1.3 CM2
ECHO AV AREA/BSA PEAK VELOCITY: 0.6 CM2/M2
ECHO AV AREA/BSA VTI: 0.6 CM2/M2
ECHO AV CUSP MM: 1.4 CM
ECHO AV MEAN GRADIENT: 7 MMHG
ECHO AV MEAN VELOCITY: 1.3 M/S
ECHO AV PEAK GRADIENT: 12 MMHG
ECHO AV PEAK VELOCITY: 1.7 M/S
ECHO AV VELOCITY RATIO: 0.47
ECHO AV VTI: 37.5 CM
ECHO BSA: 2.08 M2
ECHO EST RA PRESSURE: 15 MMHG
ECHO LA AREA 2C: 21.7 CM2
ECHO LA AREA 4C: 22.9 CM2
ECHO LA DIAMETER INDEX: 2.29 CM/M2
ECHO LA DIAMETER: 4.7 CM
ECHO LA MAJOR AXIS: 5.9 CM
ECHO LA MINOR AXIS: 5.7 CM
ECHO LA VOL BP: 73 ML (ref 18–58)
ECHO LA VOL MOD A2C: 69 ML (ref 18–58)
ECHO LA VOL MOD A4C: 74 ML (ref 18–58)
ECHO LA VOL/BSA BIPLANE: 36 ML/M2 (ref 16–34)
ECHO LA VOLUME INDEX MOD A2C: 34 ML/M2 (ref 16–34)
ECHO LA VOLUME INDEX MOD A4C: 36 ML/M2 (ref 16–34)
ECHO LV E' LATERAL VELOCITY: 7.5 CM/S
ECHO LV E' SEPTAL VELOCITY: 2.4 CM/S
ECHO LV EDV A2C: 244 ML
ECHO LV EDV A4C: 193 ML
ECHO LV EDV INDEX A4C: 94 ML/M2
ECHO LV EDV NDEX A2C: 119 ML/M2
ECHO LV EF PHYSICIAN: 25 %
ECHO LV EJECTION FRACTION A2C: 25 %
ECHO LV EJECTION FRACTION A4C: 23 %
ECHO LV EJECTION FRACTION BIPLANE: 22 % (ref 55–100)
ECHO LV ESV A2C: 183 ML
ECHO LV ESV A4C: 151 ML
ECHO LV ESV INDEX A2C: 89 ML/M2
ECHO LV ESV INDEX A4C: 74 ML/M2
ECHO LV FRACTIONAL SHORTENING: 22 % (ref 28–44)
ECHO LV INTERNAL DIMENSION DIASTOLE INDEX: 2.83 CM/M2
ECHO LV INTERNAL DIMENSION DIASTOLIC: 5.8 CM (ref 4.2–5.9)
ECHO LV INTERNAL DIMENSION SYSTOLIC INDEX: 2.2 CM/M2
ECHO LV INTERNAL DIMENSION SYSTOLIC: 4.5 CM
ECHO LV ISOVOLUMETRIC RELAXATION TIME (IVRT): 74 MS
ECHO LV IVSD: 0.9 CM (ref 0.6–1)
ECHO LV MASS 2D: 218.1 G (ref 88–224)
ECHO LV MASS INDEX 2D: 106.4 G/M2 (ref 49–115)
ECHO LV POSTERIOR WALL DIASTOLIC: 1 CM (ref 0.6–1)
ECHO LV RELATIVE WALL THICKNESS RATIO: 0.34
ECHO LVOT AREA: 2.8 CM2
ECHO LVOT AV VTI INDEX: 0.44
ECHO LVOT DIAM: 1.9 CM
ECHO LVOT MEAN GRADIENT: 1 MMHG
ECHO LVOT PEAK GRADIENT: 2 MMHG
ECHO LVOT PEAK VELOCITY: 0.8 M/S
ECHO LVOT STROKE VOLUME INDEX: 22.8 ML/M2
ECHO LVOT SV: 46.8 ML
ECHO LVOT VTI: 16.5 CM
ECHO MV A VELOCITY: 0.38 M/S
ECHO MV E DECELERATION TIME (DT): 207 MS
ECHO MV E VELOCITY: 1.11 M/S
ECHO MV E/A RATIO: 2.92
ECHO MV E/E' LATERAL: 14.8
ECHO MV E/E' RATIO (AVERAGED): 30.53
ECHO MV E/E' SEPTAL: 46.25
ECHO MV REGURGITANT PEAK GRADIENT: 52 MMHG
ECHO MV REGURGITANT PEAK VELOCITY: 3.6 M/S
ECHO PV MAX VELOCITY: 0.6 M/S
ECHO PV PEAK GRADIENT: 1 MMHG
ECHO RV INTERNAL DIMENSION: 3.5 CM
ECHO RV TAPSE: 1.4 CM (ref 1.7–?)
ECHO TV E WAVE: 0.5 M/S
EKG ATRIAL RATE: 71 BPM
EKG P AXIS: -12 DEGREES
EKG P-R INTERVAL: 168 MS
EKG Q-T INTERVAL: 412 MS
EKG QRS DURATION: 100 MS
EKG QTC CALCULATION (BAZETT): 447 MS
EKG R AXIS: 168 DEGREES
EKG T AXIS: -52 DEGREES
EKG VENTRICULAR RATE: 71 BPM
EOSINOPHIL NFR BLD AUTO: 2.5 %
EOSINOPHILS ABSOLUTE: 0.3 THOU/MM3 (ref 0–0.4)
ERYTHROCYTE [DISTWIDTH] IN BLOOD BY AUTOMATED COUNT: 17.4 % (ref 11.5–14.5)
ERYTHROCYTE [DISTWIDTH] IN BLOOD BY AUTOMATED COUNT: 17.4 % (ref 11.5–14.5)
ERYTHROCYTE [SEDIMENTATION RATE] IN BLOOD BY WESTERGREN METHOD: 59 MM/HR (ref 0–20)
FERRITIN SERPL IA-MCNC: 226 NG/ML (ref 30–400)
GFR SERPL CREATININE-BSD FRML MDRD: 8 ML/MIN/1.73M2
GFR SERPL CREATININE-BSD FRML MDRD: 8 ML/MIN/1.73M2
GLUCOSE BLD STRIP.AUTO-MCNC: 115 MG/DL (ref 70–108)
GLUCOSE BLD STRIP.AUTO-MCNC: 148 MG/DL (ref 70–108)
GLUCOSE BLD STRIP.AUTO-MCNC: 158 MG/DL (ref 70–108)
GLUCOSE BLD STRIP.AUTO-MCNC: 170 MG/DL (ref 70–108)
GLUCOSE BLD STRIP.AUTO-MCNC: 200 MG/DL (ref 70–108)
GLUCOSE SERPL-MCNC: 162 MG/DL (ref 74–109)
GLUCOSE SERPL-MCNC: 227 MG/DL (ref 74–109)
HBA1C MFR BLD HPLC: 5.5 % (ref 4–6)
HCT VFR BLD AUTO: 25.4 % (ref 42–52)
HCT VFR BLD AUTO: 26.6 % (ref 42–52)
HEPARIN UNFRACTIONATED: 0.04 U/ML (ref 0.3–0.7)
HEPARIN UNFRACTIONATED: 0.11 U/ML (ref 0.3–0.7)
HEPARIN UNFRACTIONATED: 0.14 U/ML (ref 0.3–0.7)
HGB BLD-MCNC: 7.8 GM/DL (ref 14–18)
HGB BLD-MCNC: 8.1 GM/DL (ref 14–18)
HGB RETIC QN AUTO: 27.4 PG (ref 28.2–35.7)
IMM GRANULOCYTES # BLD AUTO: 0.07 THOU/MM3 (ref 0–0.07)
IMM GRANULOCYTES NFR BLD AUTO: 0.5 %
IMM RETICS NFR: 21.4 % (ref 2.3–13.4)
INR PPP: 1.18 (ref 0.85–1.13)
IRON SATN MFR SERPL: 17 % (ref 20–50)
IRON SERPL-MCNC: 32 UG/DL (ref 61–157)
LACTATE SERPL-SCNC: 1.2 MMOL/L (ref 0.5–2.2)
LIPASE SERPL-CCNC: 33 U/L (ref 13–60)
LYMPHOCYTES ABSOLUTE: 0.9 THOU/MM3 (ref 1–4.8)
LYMPHOCYTES NFR BLD AUTO: 7 %
MAGNESIUM SERPL-MCNC: 2.2 MG/DL (ref 1.6–2.6)
MCH RBC QN AUTO: 30.5 PG (ref 26–33)
MCH RBC QN AUTO: 30.7 PG (ref 26–33)
MCHC RBC AUTO-ENTMCNC: 30.5 GM/DL (ref 32.2–35.5)
MCHC RBC AUTO-ENTMCNC: 30.7 GM/DL (ref 32.2–35.5)
MCV RBC AUTO: 100 FL (ref 80–94)
MCV RBC AUTO: 100 FL (ref 80–94)
MONOCYTES ABSOLUTE: 0.9 THOU/MM3 (ref 0.4–1.3)
MONOCYTES NFR BLD AUTO: 7.1 %
NEUTROPHILS ABSOLUTE: 10.6 THOU/MM3 (ref 1.8–7.7)
NEUTROPHILS NFR BLD AUTO: 82.3 %
NRBC BLD AUTO-RTO: 0 /100 WBC
NT-PROBNP SERPL IA-MCNC: ABNORMAL PG/ML (ref 0–124)
OSMOLALITY SERPL CALC.SUM OF ELEC: 278.7 MOSMOL/KG (ref 275–300)
OSMOLALITY SERPL CALC.SUM OF ELEC: 281 MOSMOL/KG (ref 275–300)
PLATELET # BLD AUTO: 171 THOU/MM3 (ref 130–400)
PLATELET # BLD AUTO: 176 THOU/MM3 (ref 130–400)
PMV BLD AUTO: 11.5 FL (ref 9.4–12.4)
PMV BLD AUTO: 11.5 FL (ref 9.4–12.4)
POTASSIUM SERPL-SCNC: 5.1 MEQ/L (ref 3.5–5.2)
POTASSIUM SERPL-SCNC: 5.1 MEQ/L (ref 3.5–5.2)
PROT SERPL-MCNC: 6.6 G/DL (ref 6.4–8.3)
PROTHROMBIN TIME: 13.5 SECONDS (ref 10–13.5)
RBC # BLD AUTO: 2.54 MILL/MM3 (ref 4.7–6.1)
RBC # BLD AUTO: 2.66 MILL/MM3 (ref 4.7–6.1)
RETICS # AUTO: 117 THOU/MM3 (ref 20–115)
RETICS/RBC NFR AUTO: 4.6 % (ref 0.5–2)
SODIUM SERPL-SCNC: 129 MEQ/L (ref 135–145)
SODIUM SERPL-SCNC: 132 MEQ/L (ref 135–145)
TIBC SERPL-MCNC: 192 UG/DL (ref 171–450)
TROPONIN, HIGH SENSITIVITY: ABNORMAL NG/L (ref 0–12)
TROPONIN, HIGH SENSITIVITY: ABNORMAL NG/L (ref 0–12)
WBC # BLD AUTO: 12.9 THOU/MM3 (ref 4.8–10.8)
WBC # BLD AUTO: 13.4 THOU/MM3 (ref 4.8–10.8)

## 2025-06-16 PROCEDURE — 36415 COLL VENOUS BLD VENIPUNCTURE: CPT

## 2025-06-16 PROCEDURE — 6360000002 HC RX W HCPCS

## 2025-06-16 PROCEDURE — 6370000000 HC RX 637 (ALT 250 FOR IP): Performed by: INTERNAL MEDICINE

## 2025-06-16 PROCEDURE — 6370000000 HC RX 637 (ALT 250 FOR IP)

## 2025-06-16 PROCEDURE — 85046 RETICYTE/HGB CONCENTRATE: CPT

## 2025-06-16 PROCEDURE — 85027 COMPLETE CBC AUTOMATED: CPT

## 2025-06-16 PROCEDURE — 85520 HEPARIN ASSAY: CPT

## 2025-06-16 PROCEDURE — 83605 ASSAY OF LACTIC ACID: CPT

## 2025-06-16 PROCEDURE — 93010 ELECTROCARDIOGRAM REPORT: CPT | Performed by: INTERNAL MEDICINE

## 2025-06-16 PROCEDURE — 94761 N-INVAS EAR/PLS OXIMETRY MLT: CPT

## 2025-06-16 PROCEDURE — 2700000000 HC OXYGEN THERAPY PER DAY

## 2025-06-16 PROCEDURE — 83550 IRON BINDING TEST: CPT

## 2025-06-16 PROCEDURE — P9047 ALBUMIN (HUMAN), 25%, 50ML: HCPCS | Performed by: INTERNAL MEDICINE

## 2025-06-16 PROCEDURE — 80048 BASIC METABOLIC PNL TOTAL CA: CPT

## 2025-06-16 PROCEDURE — 6360000002 HC RX W HCPCS: Performed by: INTERNAL MEDICINE

## 2025-06-16 PROCEDURE — 85610 PROTHROMBIN TIME: CPT

## 2025-06-16 PROCEDURE — 90935 HEMODIALYSIS ONE EVALUATION: CPT

## 2025-06-16 PROCEDURE — 1200000003 HC TELEMETRY R&B

## 2025-06-16 PROCEDURE — 93005 ELECTROCARDIOGRAM TRACING: CPT | Performed by: INTERNAL MEDICINE

## 2025-06-16 PROCEDURE — C8929 TTE W OR WO FOL WCON,DOPPLER: HCPCS

## 2025-06-16 PROCEDURE — 83540 ASSAY OF IRON: CPT

## 2025-06-16 PROCEDURE — 84484 ASSAY OF TROPONIN QUANT: CPT

## 2025-06-16 PROCEDURE — 99223 1ST HOSP IP/OBS HIGH 75: CPT | Performed by: INTERNAL MEDICINE

## 2025-06-16 PROCEDURE — 82948 REAGENT STRIP/BLOOD GLUCOSE: CPT

## 2025-06-16 PROCEDURE — 93306 TTE W/DOPPLER COMPLETE: CPT | Performed by: INTERNAL MEDICINE

## 2025-06-16 PROCEDURE — 99232 SBSQ HOSP IP/OBS MODERATE 35: CPT | Performed by: INTERNAL MEDICINE

## 2025-06-16 PROCEDURE — 2500000003 HC RX 250 WO HCPCS

## 2025-06-16 PROCEDURE — 70450 CT HEAD/BRAIN W/O DYE: CPT

## 2025-06-16 PROCEDURE — 85730 THROMBOPLASTIN TIME PARTIAL: CPT

## 2025-06-16 PROCEDURE — 6360000004 HC RX CONTRAST MEDICATION

## 2025-06-16 PROCEDURE — 83036 HEMOGLOBIN GLYCOSYLATED A1C: CPT

## 2025-06-16 PROCEDURE — 5A1D70Z PERFORMANCE OF URINARY FILTRATION, INTERMITTENT, LESS THAN 6 HOURS PER DAY: ICD-10-PCS | Performed by: INTERNAL MEDICINE

## 2025-06-16 PROCEDURE — 82728 ASSAY OF FERRITIN: CPT

## 2025-06-16 PROCEDURE — 94640 AIRWAY INHALATION TREATMENT: CPT

## 2025-06-16 RX ORDER — HEPARIN SODIUM 5000 [USP'U]/ML
5000 INJECTION, SOLUTION INTRAVENOUS; SUBCUTANEOUS EVERY 8 HOURS SCHEDULED
Status: DISCONTINUED | OUTPATIENT
Start: 2025-06-16 | End: 2025-06-16 | Stop reason: DRUGHIGH

## 2025-06-16 RX ORDER — INSULIN GLARGINE 100 [IU]/ML
10 INJECTION, SOLUTION SUBCUTANEOUS 2 TIMES DAILY
Status: ON HOLD | COMMUNITY
End: 2025-06-21 | Stop reason: HOSPADM

## 2025-06-16 RX ORDER — ONDANSETRON 4 MG/1
4 TABLET, ORALLY DISINTEGRATING ORAL EVERY 8 HOURS PRN
Status: DISCONTINUED | OUTPATIENT
Start: 2025-06-16 | End: 2025-06-21 | Stop reason: HOSPADM

## 2025-06-16 RX ORDER — POLYETHYLENE GLYCOL 3350 17 G/17G
17 POWDER, FOR SOLUTION ORAL DAILY PRN
Status: DISCONTINUED | OUTPATIENT
Start: 2025-06-16 | End: 2025-06-21 | Stop reason: HOSPADM

## 2025-06-16 RX ORDER — ROSUVASTATIN CALCIUM 10 MG/1
10 TABLET, COATED ORAL NIGHTLY
Status: DISCONTINUED | OUTPATIENT
Start: 2025-06-16 | End: 2025-06-21 | Stop reason: HOSPADM

## 2025-06-16 RX ORDER — GLUCAGON 1 MG/ML
1 KIT INJECTION PRN
Status: DISCONTINUED | OUTPATIENT
Start: 2025-06-16 | End: 2025-06-21 | Stop reason: HOSPADM

## 2025-06-16 RX ORDER — OXYCODONE HYDROCHLORIDE 5 MG/1
5 CAPSULE ORAL EVERY 6 HOURS PRN
Status: ON HOLD | COMMUNITY
End: 2025-06-21 | Stop reason: HOSPADM

## 2025-06-16 RX ORDER — ASPIRIN 81 MG/1
324 TABLET, CHEWABLE ORAL ONCE
Status: COMPLETED | OUTPATIENT
Start: 2025-06-16 | End: 2025-06-16

## 2025-06-16 RX ORDER — AMLODIPINE BESYLATE 10 MG/1
10 TABLET ORAL DAILY
Status: DISCONTINUED | OUTPATIENT
Start: 2025-06-16 | End: 2025-06-21 | Stop reason: HOSPADM

## 2025-06-16 RX ORDER — ALBUTEROL SULFATE 0.83 MG/ML
2.5 SOLUTION RESPIRATORY (INHALATION) 3 TIMES DAILY PRN
Status: ON HOLD | COMMUNITY
End: 2025-06-21 | Stop reason: HOSPADM

## 2025-06-16 RX ORDER — IPRATROPIUM BROMIDE AND ALBUTEROL SULFATE 2.5; .5 MG/3ML; MG/3ML
1 SOLUTION RESPIRATORY (INHALATION) EVERY 4 HOURS PRN
Status: ON HOLD | COMMUNITY
End: 2025-06-21 | Stop reason: HOSPADM

## 2025-06-16 RX ORDER — SODIUM CHLORIDE 0.9 % (FLUSH) 0.9 %
5-40 SYRINGE (ML) INJECTION EVERY 12 HOURS SCHEDULED
Status: DISCONTINUED | OUTPATIENT
Start: 2025-06-16 | End: 2025-06-21 | Stop reason: HOSPADM

## 2025-06-16 RX ORDER — HEPARIN SODIUM 1000 [USP'U]/ML
2000 INJECTION, SOLUTION INTRAVENOUS; SUBCUTANEOUS PRN
Status: DISCONTINUED | OUTPATIENT
Start: 2025-06-16 | End: 2025-06-21 | Stop reason: HOSPADM

## 2025-06-16 RX ORDER — HYDRALAZINE HYDROCHLORIDE 25 MG/1
25 TABLET, FILM COATED ORAL 3 TIMES DAILY
Status: DISCONTINUED | OUTPATIENT
Start: 2025-06-16 | End: 2025-06-21 | Stop reason: HOSPADM

## 2025-06-16 RX ORDER — MIDODRINE HYDROCHLORIDE 5 MG/1
5 TABLET ORAL
Status: COMPLETED | OUTPATIENT
Start: 2025-06-16 | End: 2025-06-16

## 2025-06-16 RX ORDER — BUMETANIDE 0.25 MG/ML
1 INJECTION, SOLUTION INTRAMUSCULAR; INTRAVENOUS ONCE
Status: COMPLETED | OUTPATIENT
Start: 2025-06-16 | End: 2025-06-16

## 2025-06-16 RX ORDER — ONDANSETRON 2 MG/ML
4 INJECTION INTRAMUSCULAR; INTRAVENOUS EVERY 6 HOURS PRN
Status: DISCONTINUED | OUTPATIENT
Start: 2025-06-16 | End: 2025-06-21 | Stop reason: HOSPADM

## 2025-06-16 RX ORDER — GABAPENTIN 100 MG/1
100 CAPSULE ORAL
Status: DISCONTINUED | OUTPATIENT
Start: 2025-06-16 | End: 2025-06-20

## 2025-06-16 RX ORDER — POLYETHYLENE GLYCOL 3350 17 G/17G
17 POWDER, FOR SOLUTION ORAL DAILY PRN
COMMUNITY

## 2025-06-16 RX ORDER — METOPROLOL SUCCINATE 25 MG/1
12.5 TABLET, EXTENDED RELEASE ORAL DAILY
Status: ON HOLD | COMMUNITY
End: 2025-06-21 | Stop reason: HOSPADM

## 2025-06-16 RX ORDER — ASPIRIN 81 MG/1
81 TABLET ORAL DAILY
Status: ON HOLD | COMMUNITY
End: 2025-06-21 | Stop reason: HOSPADM

## 2025-06-16 RX ORDER — SODIUM CHLORIDE 0.9 % (FLUSH) 0.9 %
5-40 SYRINGE (ML) INJECTION PRN
Status: DISCONTINUED | OUTPATIENT
Start: 2025-06-16 | End: 2025-06-21 | Stop reason: HOSPADM

## 2025-06-16 RX ORDER — ACETAMINOPHEN 500 MG
500 TABLET ORAL EVERY 6 HOURS PRN
Status: ON HOLD | COMMUNITY
End: 2025-06-21 | Stop reason: HOSPADM

## 2025-06-16 RX ORDER — BUDESONIDE AND FORMOTEROL FUMARATE DIHYDRATE 160; 4.5 UG/1; UG/1
2 AEROSOL RESPIRATORY (INHALATION)
Status: DISCONTINUED | OUTPATIENT
Start: 2025-06-16 | End: 2025-06-21 | Stop reason: HOSPADM

## 2025-06-16 RX ORDER — ERGOCALCIFEROL 1.25 MG/1
50000 CAPSULE, LIQUID FILLED ORAL WEEKLY
COMMUNITY

## 2025-06-16 RX ORDER — PANTOPRAZOLE SODIUM 40 MG/1
40 TABLET, DELAYED RELEASE ORAL
Status: DISCONTINUED | OUTPATIENT
Start: 2025-06-16 | End: 2025-06-21 | Stop reason: HOSPADM

## 2025-06-16 RX ORDER — ACETAMINOPHEN 325 MG/1
650 TABLET ORAL EVERY 6 HOURS PRN
Status: DISCONTINUED | OUTPATIENT
Start: 2025-06-16 | End: 2025-06-21 | Stop reason: HOSPADM

## 2025-06-16 RX ORDER — HEPARIN SODIUM 10000 [USP'U]/100ML
5-30 INJECTION, SOLUTION INTRAVENOUS CONTINUOUS
Status: DISCONTINUED | OUTPATIENT
Start: 2025-06-16 | End: 2025-06-21 | Stop reason: HOSPADM

## 2025-06-16 RX ORDER — ASPIRIN 81 MG/1
81 TABLET, CHEWABLE ORAL DAILY
Status: DISCONTINUED | OUTPATIENT
Start: 2025-06-16 | End: 2025-06-21 | Stop reason: HOSPADM

## 2025-06-16 RX ORDER — HYDROXYZINE HYDROCHLORIDE 25 MG/1
50 TABLET, FILM COATED ORAL 3 TIMES DAILY PRN
Status: DISCONTINUED | OUTPATIENT
Start: 2025-06-16 | End: 2025-06-21 | Stop reason: HOSPADM

## 2025-06-16 RX ORDER — GABAPENTIN 100 MG/1
100 CAPSULE ORAL 2 TIMES DAILY
Status: DISCONTINUED | OUTPATIENT
Start: 2025-06-16 | End: 2025-06-16

## 2025-06-16 RX ORDER — DOCUSATE SODIUM 100 MG/1
100 CAPSULE, LIQUID FILLED ORAL 2 TIMES DAILY PRN
COMMUNITY

## 2025-06-16 RX ORDER — MORPHINE SULFATE 2 MG/ML
1 INJECTION, SOLUTION INTRAMUSCULAR; INTRAVENOUS ONCE
Status: COMPLETED | OUTPATIENT
Start: 2025-06-16 | End: 2025-06-16

## 2025-06-16 RX ORDER — HEPARIN SODIUM 1000 [USP'U]/ML
4000 INJECTION, SOLUTION INTRAVENOUS; SUBCUTANEOUS ONCE
Status: COMPLETED | OUTPATIENT
Start: 2025-06-16 | End: 2025-06-16

## 2025-06-16 RX ORDER — HEPARIN SODIUM 1000 [USP'U]/ML
4000 INJECTION, SOLUTION INTRAVENOUS; SUBCUTANEOUS PRN
Status: DISCONTINUED | OUTPATIENT
Start: 2025-06-16 | End: 2025-06-21 | Stop reason: HOSPADM

## 2025-06-16 RX ORDER — METOPROLOL TARTRATE 25 MG/1
25 TABLET, FILM COATED ORAL 2 TIMES DAILY
Status: DISCONTINUED | OUTPATIENT
Start: 2025-06-16 | End: 2025-06-21 | Stop reason: HOSPADM

## 2025-06-16 RX ORDER — GABAPENTIN 100 MG/1
100 CAPSULE ORAL 2 TIMES DAILY
Status: ON HOLD | COMMUNITY
End: 2025-06-21 | Stop reason: HOSPADM

## 2025-06-16 RX ORDER — BUDESONIDE 0.5 MG/2ML
1 INHALANT ORAL 2 TIMES DAILY
Status: ON HOLD | COMMUNITY
End: 2025-06-21 | Stop reason: HOSPADM

## 2025-06-16 RX ORDER — AMIODARONE HYDROCHLORIDE 200 MG/1
200 TABLET ORAL 2 TIMES DAILY
Status: ON HOLD | COMMUNITY
End: 2025-06-21 | Stop reason: HOSPADM

## 2025-06-16 RX ORDER — ROSUVASTATIN CALCIUM 20 MG/1
40 TABLET, COATED ORAL NIGHTLY
Status: DISCONTINUED | OUTPATIENT
Start: 2025-06-16 | End: 2025-06-16

## 2025-06-16 RX ORDER — INSULIN LISPRO 100 [IU]/ML
0-8 INJECTION, SOLUTION INTRAVENOUS; SUBCUTANEOUS
Status: DISCONTINUED | OUTPATIENT
Start: 2025-06-16 | End: 2025-06-21 | Stop reason: HOSPADM

## 2025-06-16 RX ORDER — DEXTROSE MONOHYDRATE 100 MG/ML
INJECTION, SOLUTION INTRAVENOUS CONTINUOUS PRN
Status: DISCONTINUED | OUTPATIENT
Start: 2025-06-16 | End: 2025-06-21 | Stop reason: HOSPADM

## 2025-06-16 RX ORDER — ACETAMINOPHEN 650 MG/1
650 SUPPOSITORY RECTAL EVERY 6 HOURS PRN
Status: DISCONTINUED | OUTPATIENT
Start: 2025-06-16 | End: 2025-06-21 | Stop reason: HOSPADM

## 2025-06-16 RX ORDER — SODIUM BICARBONATE 650 MG/1
650 TABLET ORAL DAILY
Status: ON HOLD | COMMUNITY
End: 2025-06-21 | Stop reason: HOSPADM

## 2025-06-16 RX ORDER — ALBUMIN (HUMAN) 12.5 G/50ML
25 SOLUTION INTRAVENOUS ONCE
Status: COMPLETED | OUTPATIENT
Start: 2025-06-16 | End: 2025-06-16

## 2025-06-16 RX ORDER — SODIUM CHLORIDE 9 MG/ML
INJECTION, SOLUTION INTRAVENOUS PRN
Status: DISCONTINUED | OUTPATIENT
Start: 2025-06-16 | End: 2025-06-21 | Stop reason: HOSPADM

## 2025-06-16 RX ADMIN — GABAPENTIN 100 MG: 100 CAPSULE ORAL at 17:39

## 2025-06-16 RX ADMIN — HEPARIN SODIUM 11 UNITS/KG/HR: 10000 INJECTION, SOLUTION INTRAVENOUS at 04:44

## 2025-06-16 RX ADMIN — BUMETANIDE 1 MG: 0.25 INJECTION INTRAMUSCULAR; INTRAVENOUS at 04:38

## 2025-06-16 RX ADMIN — SULFUR HEXAFLUORIDE 2 ML: KIT at 14:02

## 2025-06-16 RX ADMIN — MIDODRINE HYDROCHLORIDE 5 MG: 5 TABLET ORAL at 09:37

## 2025-06-16 RX ADMIN — BUDESONIDE AND FORMOTEROL FUMARATE DIHYDRATE 2 PUFF: 160; 4.5 AEROSOL RESPIRATORY (INHALATION) at 17:50

## 2025-06-16 RX ADMIN — MORPHINE SULFATE 1 MG: 2 INJECTION, SOLUTION INTRAMUSCULAR; INTRAVENOUS at 23:39

## 2025-06-16 RX ADMIN — INSULIN LISPRO 2 UNITS: 100 INJECTION, SOLUTION INTRAVENOUS; SUBCUTANEOUS at 20:55

## 2025-06-16 RX ADMIN — HYDROXYZINE HYDROCHLORIDE 50 MG: 25 TABLET, FILM COATED ORAL at 18:13

## 2025-06-16 RX ADMIN — ASPIRIN 324 MG: 81 TABLET, CHEWABLE ORAL at 04:39

## 2025-06-16 RX ADMIN — SODIUM CHLORIDE, PRESERVATIVE FREE 10 ML: 5 INJECTION INTRAVENOUS at 20:28

## 2025-06-16 RX ADMIN — PANTOPRAZOLE SODIUM 40 MG: 40 TABLET, DELAYED RELEASE ORAL at 07:28

## 2025-06-16 RX ADMIN — ALBUMIN (HUMAN) 25 G: 0.25 INJECTION, SOLUTION INTRAVENOUS at 09:37

## 2025-06-16 RX ADMIN — ASPIRIN 81 MG: 81 TABLET, CHEWABLE ORAL at 08:09

## 2025-06-16 RX ADMIN — HEPARIN SODIUM 2000 UNITS: 1000 INJECTION INTRAVENOUS; SUBCUTANEOUS at 20:52

## 2025-06-16 RX ADMIN — HEPARIN SODIUM 4000 UNITS: 1000 INJECTION INTRAVENOUS; SUBCUTANEOUS at 04:38

## 2025-06-16 RX ADMIN — HEPARIN SODIUM 2000 UNITS: 1000 INJECTION INTRAVENOUS; SUBCUTANEOUS at 12:51

## 2025-06-16 RX ADMIN — ROSUVASTATIN CALCIUM 10 MG: 10 TABLET, FILM COATED ORAL at 20:28

## 2025-06-16 RX ADMIN — EMPAGLIFLOZIN 10 MG: 10 TABLET, FILM COATED ORAL at 08:05

## 2025-06-16 ASSESSMENT — PAIN - FUNCTIONAL ASSESSMENT: PAIN_FUNCTIONAL_ASSESSMENT: ACTIVITIES ARE NOT PREVENTED

## 2025-06-16 ASSESSMENT — PAIN SCALES - GENERAL: PAINLEVEL_OUTOF10: 4

## 2025-06-16 ASSESSMENT — PAIN DESCRIPTION - ORIENTATION: ORIENTATION: MID

## 2025-06-16 ASSESSMENT — PAIN DESCRIPTION - DESCRIPTORS: DESCRIPTORS: PRESSURE

## 2025-06-16 ASSESSMENT — HEART SCORE: ECG: NON-SPECIFC REPOLARIZATION DISTURBANCE/LBTB/PM

## 2025-06-16 ASSESSMENT — PAIN DESCRIPTION - LOCATION: LOCATION: CHEST

## 2025-06-16 NOTE — ED NOTES
ED to inpatient nurses report      Chief Complaint:  Chief Complaint   Patient presents with    Fatigue    Shortness of Breath     Present to ED from: home    MOA:     LOC: alert and orientated to name, place, date  Mobility: Requires assistance * 2  Oxygen Baseline: 3L NC    Current needs required: 3L NC     Code Status:   Full Code    What abnormal results were found and what did you give/do to treat them? ESRD, chest pain  Any procedures or intervention occur? EKG, labs    Mental Status:       Psych Assessment:        Vitals:  Patient Vitals for the past 24 hrs:   BP Temp Temp src Pulse Resp SpO2 Weight   06/16/25 0129 -- -- -- 68 -- 100 % --   06/16/25 0112 111/81 -- -- 65 -- -- --   06/16/25 0012 112/81 -- -- 69 -- -- --   06/15/25 2345 -- -- -- 70 -- 100 % --   06/15/25 2328 110/76 -- -- 69 -- 100 % --   06/15/25 2259 116/78 98.1 °F (36.7 °C) Oral 72 18 96 % 77.1 kg (170 lb)        LDAs:      Ambulatory Status:  Presents to emergency department  because of falls (Syncope, seizure, or loss of consciousness): No, Age > 70: No, Altered Mental Status, Intoxication with alcohol or substance confusion (Disorientation, impaired judgment, poor safety awaremess, or inability to follow instructions): No, Impaired Mobility: Ambulates or transfers with assistive devices or assistance; Unable to ambulate or transer.: Yes, Nursing Judgement: Yes    Diagnosis:  DISPOSITION Admitted 06/16/2025 01:29:56 AM   Final diagnoses:   Chest pain, unspecified type   ESRD (end stage renal disease) (HCC)   Cardiomyopathy, unspecified type (HCC)        Consults:  IP CONSULT TO NEPHROLOGY     Pain Score:  Pain Assessment  Pain Assessment: None - Denies Pain    C-SSRS:   Risk of Suicide: No Risk    Sepsis Screening:       [place] Fall Risk:  [place] 1 Fall Risk  Presents to emergency department  because of falls (Syncope, seizure, or loss of consciousness): No  Age > 70: No  Altered Mental Status, Intoxication with alcohol or substance  confusion (Disorientation, impaired judgment, poor safety awaremess, or inability to follow instructions): No  Impaired Mobility: Ambulates or transfers with assistive devices or assistance; Unable to ambulate or transer.: Yes  Nursing Judgement: Yes  Standard Fall Risk Interventions : [place] the patient to the environment, especially the location of the bathroom, Bed in lowest position and wheels locked, as applicable to the type of bed, when a patient is resting in bed, raise bed to a comfortable height when the patient is transferring out of bed, as appropriate, Provide a safe environment by clearing a pathway free of plugs, IV poles, and other obstructing items    Swallow Screening        Preferred Language:   English      ALLERGIES     Sitagliptin, Sulfa antibiotics, Cephalexin, Cephalexin, and Quinapril hcl    SURGICAL HISTORY       Past Surgical History:   Procedure Laterality Date    APPENDECTOMY  5/31/12    lap mehul-Dr. Silva    ARM SURGERY      CARDIAC CATHETERIZATION  2007    WNL, no stents     COLONOSCOPY Left 2/26/2018    COLONOSCOPY POLYPECTOMY HOT BIOPSY performed by David Murray MD at [facility]    FOOT SURGERY Right 2008    Rt leg    HAND SURGERY Left 2010    VASCULAR SURGERY      pinched nerve left arm       PAST MEDICAL HISTORY       Past Medical History:   Diagnosis Date    Abscess of leg     Allergic rhinitis     Asthma     Blood circulation, collateral     numbness in hands and feet    GERD (gastroesophageal reflux disease)     Headache(784.0)     Hyperlipidemia     Hypertension     Neuropathy     Osteoarthritis     Other disorders of kidney and ureter     Pneumonia     Type II or unspecified type diabetes mellitus without mention of complication, not stated as uncontrolled     type 2           Electronically signed by Esvin Armijo RN on 6/16/2025 at 2:06 AM

## 2025-06-16 NOTE — CONSULTS
Nephrology Consult Note      PatPt Name: Yaakov Kemp  MRN: [id number]  [id number]  YOB: 1965  Admit Date: 6/15/2025 10:54 PM  Date of evaluation: 6/16/2025  Primary Care Physician: Shu Marsh APRN - CNP   [id number]     Nephrologist:  Oscar Londono MD, FASN    Reason for Consult: BASIL on IHD     Primary Care Physician  Shu Marsh APRN - CNP      History Obtained From:  patient    History of Present Ilness:     Yaakov Kemp is a 59 y.o. male with PMHx of CAD, HFrEF, IDDM 2, HTN, TIA who presents to [facility] from home with chest pain.  Patient was admitted at [facility] for 2 weeks.  Initially went in for pneumonia.  Patient had MI and underwent LHC which found multivessel disease.  Patient was deemed not a candidate for CABG at the time due to EF of 25%.  Patient was given LifeVest.  Patient also requiring new onset dialysis.  Patient also had CVA.  Patient decided to leave A because he did not want to be on the rehab floor.  Patient was not sent home on GDMT or treatment for TIA. Currently denies  SOB , no chest pain , No N/v   Nephrology has been consulted for current renal function .   Pt was seen and examined on IHD  , he is tolerating the procedure  .     PMHx:   Past Medical History:   Diagnosis Date    Abscess of leg     Allergic rhinitis     Asthma     Blood circulation, collateral     numbness in hands and feet    DM2 (diabetes mellitus, type 2) (HCC)     GERD (gastroesophageal reflux disease)     Headache(784.0)     Hyperlipidemia     Hypertension     Neuropathy     Osteoarthritis     Other disorders of kidney and ureter     Pneumonia     Type II or unspecified type diabetes mellitus without mention of complication, not stated as uncontrolled     type 2       PSHx:   Past Surgical History:   Procedure Laterality Date    APPENDECTOMY  5/31/12    bree Silva    ARM SURGERY      CARDIAC CATHETERIZATION  2007    WNL, no stents      recent respiratory status/pneumonia/COPD exacerbation, progression of underlying CKD 4 (creatinine 3.-3.5mg/dL)could also be contributing to current renal function  Hyponatremia  Anemia of CKD   Metabolic bone disease     No signs of renal recovery  , for now dependent on IHD     Plan   Pt was seen and examined on Hemodialysis , Albumin and midodrine was given for low Blood pressure    Attempt UF removal as tolerated  Will reassess tomorrow for hemodialysis   Check iron panel with am labs   Check PTH and phos with am labs   Will continue to closely monitor renal function, electrolytes, volume status, and acid/base balance  Keep MAP > 65 mmHG  Strict I/Os, Daily weight  Keep K = 4 and Mg = 2  Avoid nephrotoxins  No ACEs, ARBs, or NSAIDS at this time        Thank you for allowing us to participate in care of Yaakov Kemp      Electronically signed by Oscar Londono MD on 6/16/2025 at 10:07 AM    Board Certified Nephrologist

## 2025-06-16 NOTE — TELEPHONE ENCOUNTER
----- Message from DEV Sandoval CNP sent at 6/16/2025  8:03 AM EDT -----  This is a patient of Shu Marsh's DENNIS.  He should be scheduled with her office.

## 2025-06-16 NOTE — ED NOTES
Spoke to  staff who approved patient transfer to [facility]. Patient transported upstairs in stable condition.

## 2025-06-16 NOTE — FLOWSHEET NOTE
06/16/25 1215   Vital Signs   BP (!) 95/59   Temp 98 °F (36.7 °C)   Pulse 65   Respirations 16   SpO2 100 %   Weight - Scale 86.9 kg (191 lb 9.3 oz)   Weight Method Bed scale   Percent Weight Change -2.25   Post-Hemodialysis Assessment   Post-Treatment Procedures Blood returned;Catheter capped, clamped and heparinized x 2 ports   Machine Disinfection Process Acid/Vinegar Clean;Heat Disinfect   Rinseback Volume (ml) 400 ml   Blood Volume Processed (Liters) 45.9 L   Dialyzer Clearance Lightly streaked   Duration of Treatment (minutes) 180 minutes   Hemodialysis Intake (ml) 400 ml   Hemodialysis Output (ml) 2400 ml   NET Removed (ml) 2000   Tolerated Treatment Fair     Stable 3 hour TX completed. Removed 2 L of fluid, tolerated fair. pt hypotensive during TX, Dr. Londono notified and ordered 5mg Midodrine and 25g Albumin. Bilateral cath ports flushed with normal saline and Heparin Locked, clamped, secured with tegos, and capped with curios. CVC drsg clean, dry, and intact. Report given to primary RN. TX record printed for scanning into EMR.

## 2025-06-16 NOTE — CARE COORDINATION
Case Management Assessment Initial Evaluation    Date/Time of Evaluation: 2025 7:41 AM  Assessment Completed by: Mary Ibrahim RN    If patient is discharged prior to next notation, then this note serves as note for discharge by case management.    Patient Name: Yaakov Kemp                   YOB: 1965  Diagnosis: Chest pain [R07.9]  ESRD (end stage renal disease) (HCC) [N18.6]  Chest pain, unspecified type [R07.9]  Cardiomyopathy, unspecified type (HCC) [I42.9]                   Date / Time: 6/15/2025 10:54 PM  Location: [address] [place] [address]     Patient Admission Status: Inpatient   Readmission Risk Low 0-14, Mod 15-19), High > 20: Readmission Risk Score: 17.8    Current PCP: Sander Carias APRN - CNP  Health Care Decision Makers:   Primary Decision Maker: LeightonkirstieNadia GILLIAN - [facility] - [phone]    Additional Case Management Notes: From ED, creatinine 7.5, known HD pt, BNP greater than 47959.0, WBC 13.4, Troponin greater than 43140, Nephrology, Cardiology consult, telemetry, Asa, Heparin drip, Protonix, Zofran prn.    Procedures:    ECHO     Imagin/15 CXR Ill-defined bibasilar lung opacities are indeterminate. Although   atelectasis is favored, this could represent a small amount of edema or   bibasilar consolidation.      CT Head WO Contrast 1. No acute intracranial findings.     Patient Goals/Plan/Treatment Preferences: Met with Yaakov. He currently lives at home with his wife. He uses a wheelchair. He is a [facility] OP HD pt on  at 0800. Discharge plan is pending clinical course. Will follow for potential needs/services.        25 1101   Service Assessment   Patient Orientation Alert and Oriented   Cognition Alert   History Provided By Patient   Primary Caregiver Self   Support Systems Spouse/Significant Other   Patient's Healthcare Decision Maker is: Patient Declined (Legal Next of Kin Remains as Decision Maker)   PCP Verified by CM Yes   Last Visit

## 2025-06-16 NOTE — ED PROVIDER NOTES
[facility] EMERGENCY DEPARTMENT  EMERGENCY DEPARTMENT ENCOUNTER          Pt Name: Yaakov Kemp  MRN: [id number]  Birthdate 1965  Date of evaluation: 6/15/2025  Physician: Tiki Barrientos MD  Supervising Attending Physician: Jose Miguel Gill MD       CHIEF COMPLAINT       Chief Complaint   Patient presents with    Fatigue    Shortness of Breath         HISTORY OF PRESENT ILLNESS    HPI  Yaakov Kemp is a 59 y.o. male who presents to the emergency department from home, by private vehicle for evaluation of chest pain    Patient presents to the ED with complaints of exertional chest pain described as pressure all over his chest with very minimal exertion after taking few steps associated with shortness of breath.  Denies any orthopnea fever chills  Does report fatigue    Patient states he recently left AMA from [facility] after being admitted for about 2 weeks for bilateral pneumonia requiring intubation, myocardial infarction, CHF, acute renal failure with new onset dialysis, and stroke.  He was found to have a low EF and was placed on a LifeVest.  Was feeling okay initially however over the last 2 days has developed exertional shortness of breath and chest pain.  He was found to have multiple vessel CAD, was not a candidate for CABG at the time.  The patient has no other acute complaints at this time.      REVIEW OF SYSTEMS   Review of Systems      PAST MEDICAL AND SURGICAL HISTORY     Past Medical History:   Diagnosis Date    Abscess of leg     Allergic rhinitis     Asthma     Blood circulation, collateral     numbness in hands and feet    GERD (gastroesophageal reflux disease)     Headache(784.0)     Hyperlipidemia     Hypertension     Neuropathy     Osteoarthritis     Other disorders of kidney and ureter     Pneumonia     Type II or unspecified type diabetes mellitus without mention of complication, not stated as uncontrolled     type 2     Past Surgical History:   Procedure Laterality  DISKUS) 250-50 MCG/DOSE AEPB, Inhale 1 puff into the lungs every 12 hours, Disp: 3 each, Rfl: 1    Previous Medications    ALBUTEROL SULFATE HFA (PROVENTIL;VENTOLIN;PROAIR) 108 (90 BASE) MCG/ACT INHALER    Inhale 2 puffs into the lungs every 4 hours as needed for Wheezing inhale 2 puffs by mouth every 6 hours if needed for wheezing    AMLODIPINE (NORVASC) 10 MG TABLET    take 1 tablet by mouth once daily    ASPIRIN 325 MG EC TABLET    take 1 tablet by mouth once daily    DICYCLOMINE (BENTYL) 20 MG TABLET    Take 1 tablet by mouth 4 times daily (before meals and nightly)    FLUTICASONE-SALMETEROL (ADVAIR DISKUS) 250-50 MCG/DOSE AEPB    Inhale 1 puff into the lungs every 12 hours    GABAPENTIN (NEURONTIN) 400 MG CAPSULE    Take 1 capsule by mouth 2 times daily for 60 days.    GEMFIBROZIL (LOPID) 600 MG TABLET    TAKE 1 TABLET BY MOUTH TWICE DAILY    GLUCOSE MONITORING KIT (FREESTYLE) MONITORING KIT    1 kit by Does not apply route daily as needed    HYDRALAZINE (APRESOLINE) 25 MG TABLET    Take 1 tablet by mouth 3 times daily    METOPROLOL TARTRATE (LOPRESSOR) 50 MG TABLET    take 1 tablet by mouth twice a day    PANTOPRAZOLE (PROTONIX) 40 MG TABLET    Take 1 tablet by mouth every morning (before breakfast)    PRAVASTATIN (PRAVACHOL) 40 MG TABLET    Take 1 tablet by mouth daily    SILODOSIN (RAPAFLO) 4 MG CAPS CAPSULE    take 1 capsule by mouth once daily IN THE EVENING         SOCIAL HISTORY     Social History     Social History Narrative    Not on file     Social History     Tobacco Use    Smoking status: Former     Current packs/day: 0.00     Average packs/day: 1 pack/day for 30.0 years (30.0 ttl pk-yrs)     Types: Cigars, Cigarettes     Start date: 10/18/1980     Quit date: 10/18/2004     Years since quittin.6    Smokeless tobacco: Never    Tobacco comments:     5 cigars per day   Substance Use Topics    Alcohol use: Yes     Comment: occassionally    Drug use: No         ALLERGIES     Allergies   Allergen  Atherosclerotic disease: Coronary Artery Disease  Risk Factors: > 3 Risk factors or history of atherosclerotic disease*  Troponin: > 3X normal limit  Heart Score Total: 8    HEART Score recommendations:  Score 0 - 3:   <1.7%  risk of MACE over next 6 wks = Outpatient workup  Score 4 - 6: 12-16% risk of MACE over next 6 wks = Admission for observation  Score 7- 10: 50-65% risk of MACE over next 6 wks = Early invasive strategy    MACE: Major Acute Cardiac Event (All Cause Mortality, AMI or revascularization)  “Chest Pain in the Emergency Room: A Multicenter Validation of the HEART Score”. Brandon BE, Six AJ, Amber JERRICA, Zuri TP, van heather Ruiz F, Mast EG, Jaylen SH, van Filiberto RM, Cony PA. Crit Pathw Cardiol. 2010 Sep; 9(3): 164-169.  \"A prospective validation of the HEART score for chest pain patients at the emergency department.\" Int J Cardiol. 2013 Oct 3;168(3):2153-8. doi: 10.1016/j.ijcard.2013.01.255. Epub 2013 Mar 7.      Code Status:  Not addressed during this ED visit    Social determinants of health impacting treatment or disposition:  Considered and not applicable     MIPS documentation:  N/A    Medical Decision Making    59-year-old patient with multivessel CAD, ESRD, stroke, recent myocardial infarction presents to the ED with exertional chest pain shortness of breath that is not currently active.  Heart score of 8.  Deferred heparin for now as the patient is not having any active chest pain.  Troponin elevated to more than 10,000 however that is consistent with what was going on in [facility].  Patient is to be admitted for further assessment and management of his CAD chest pain and other conditions.  Patient and wife expressed understanding of the impression and plan and will be admitted.    ED COURSE   ED Medications administered this visit (None if left blank):   Medications - No data to display             PROCEDURES: (None if blank)  Procedures:     CRITICAL CARE:  None    MEDICATION CHANGES     New

## 2025-06-16 NOTE — ED NOTES
Provided blankets at this time. Patient resting in bed. Respirations easy and unlabored. No distress noted. Call light within reach.

## 2025-06-16 NOTE — H&P
History & Physical  Internal Medicine Resident         Patient: Yaakov Kemp 59 y.o. male      : 1965  Date of Admission: 6/15/2025  Date of Service: Pt seen/examined on 25 and Admitted to Inpatient with expected LOS greater than two midnights due to medical therapy.       ASSESSMENT AND PLAN  Acute on chronic HFrEF: Per family EF 25 to 30%.  Not on any GDMT given that patient left [facility].  Patient has not followed up with cardiology as of yet.  Does have LifeVest.  Not on a diuretic.  proBNP over 70,000, no baseline.  Will obtain records from [facility]  Repeat echo ordered  Start GDMT slowly: Reduce Lopressor to 25 mg twice daily, add Entresto, add Jardiance  Strict I's and O's  Daily weights  2 g sodium restriction, 2 L fluid restriction  Diuresis with Bumex 1 mg to trial  Chest pain:   CAD with known multivessel disease: Per patient had LHC done at [facility] on his last admission which found multivessel disease.  Patient was not a candidate for any surgical invention at the time due to poor heart function.  Patient was treated in ICU for cardiogenic shock. Has complaint of chest pain but pointing to his abdomen. He states its sharp and intermittent, worse with activity.   Troponin over 10,000, previously 4000 - 8300 at [facility]   Heparinize  Restart ASA, will load   Restart statin   Restart Lopressor   Cardiology consult in the a.m.  New onset ESRD, HD MWD: Patient was treated in [facility] ICU for cardiogenic shock and started on CRRT.  Patient now on intermittent dialysis.  Consult nephro for dialysis  Recent CVA: MRI done at [facility] noting tiny acute infarct she states that at home he has had some personality changes and answering questions in weird ways.  She states that this is new.  Will order CT head.  Patient has not been taking his aspirin, will add.  IDDM 2: Last A1c at [facility] 7.4. Hx of amputation RLE. Patient states he was previously on insulin.  Patient states he is not take anything at this time  floor.  Patient was not sent home on GDMT or treatment for TIA.  Patient states that at [facility] they took all of his medication pill bottles and has not been taking any medications for the last 3 days.  Patient endorses shortness of breath and what he describes as chest pain.  When asked to point to his chest pain patient points at his abdomen.  He states pain is sharp and worse with activity.  He also endorses lower extremity edema.      Review of Systems:   Pertinent positives and negatives as noted in the HPI. Otherwise complete ROS negative.    OBJECTIVE  Physical Exam:  /81   Pulse 68   Temp 98.1 °F (36.7 °C) (Oral)   Resp 18   Wt 77.1 kg (170 lb)   SpO2 100%   BMI 24.39 kg/m²   General appearance: No apparent distress, appears stated age.  Chronically ill-appearing  Eyes:  Pupils equal, round, and reactive to light. Conjunctivae/corneas clear.  HENT: Head normal in appearance. External nares normal.  Oral mucosa moist without lesions.  Hearing grossly intact.   Neck: Supple, with full range of motion. Trachea midline.  No gross JVD appreciated.  Respiratory:  Normal respiratory effort. Clear to auscultation, bilaterally without rales or wheezes or rhonchi.  Cardiovascular: Normal rate, regular rhythm with normal S1/S2 without murmurs.  No lower extremity edema. Wearing life vest   Abdomen: Soft, non-tender, non-distended with normal bowel sounds.  Musculoskeletal: No joint swelling or tenderness. Normal tone. No abnormal movements.   Skin: Warm and dry. No rashes or lesions.  Neurologic:  No focal sensory/motor deficits in the upper and lower extremities. Cranial nerves:  grossly non-focal 2-12.   Patient seems slow to respond to questions  Psychiatric: Alert and oriented, normal insight and thought content.   Capillary Refill: Brisk,< 3 seconds.  Peripheral Pulses: +2 palpable, equal bilaterally.       Medications Prior to Admission:   Prior to Admission Medications   Prescriptions Last Dose

## 2025-06-16 NOTE — PROGRESS NOTES
[facility]--HOSPITALIST GROUP    Hospitalist PROGRESS NOTE dictated by Lexa Bermudez MD on 2025    Patient ID: Yaakov Kemp is 59 y.o. and presently in room [facility]  : 1965 MRN: [id number]    Admit date: 6/15/2025  Primary Care Physician: Shu Marsh APRN - CNP   Patient Care Team:  Shu Marsh APRN - CNP as PCP - General (Nurse Practitioner, Family)  Sander Carias APRN - CNP as PCP - Empaneled Provider  Tel: [phone]  Admitting Physician: No admitting provider for patient encounter.   Code Status:  Full Code    Yaakov Kemp is a 59 y.o.  male who presented with Fatigue and Shortness of Breath    Room: [facility]  Admit date: 6/15/2025    Per report: Yaakov Kemp is a 59 y.o. male with PMHx of CAD, HFrEF, IDDM 2, HTN, TIA who presents to [facility] from home with chest pain. Patient was admitted at [facility] for 2 weeks. Initially went in for pneumonia. Patient had MI and underwent LHC which found multivessel disease. Patient was deemed not a candidate for CABG at the time due to EF of 25%. Patient was given LifeVest. Patient also requiring new onset dialysis. Patient also had TIA. Patient decided to leave AMA because he did not want to be on the rehab floor. Patient was not sent home on GDMT or treatment for CVA. I consulted cards and nephro   --------    2025: Patient new ESRD on HD, CAD, HF R EF 25%, seen this morning at dialysis did not appear dyspneic although he stated he still has some shortness of breath.  No chest pain or cough.  On 3 L O2 nasal cannula.      Disposition:   [] Home    [] Home with HHC (Home Health Care)  [] SNF (Skilled Nursing Facility)   [] Assisted living facility      Assessment:    Principal Problem:    Chest pain  Active Problems:    Below-knee amputation of right lower extremity (HCC)    Gastroesophageal reflux disease    Asthma    Hyperlipidemia    Essential  hypertension  Resolved Problems:    * No resolved hospital problems. *  Chronic hypoxic respiratory failure on 3-4 l home O2.  Elevated troponin: Reportedly ranged 6413-6225 at [facility], found to be above 10,000 at [facility].  CHF with systolic dysfunction:  Obstructive CAD: identified on LHC at [facility]  Acute on chronic HFrEF   New onset ESRD, HD MWD   IDDM 2   Recent CVA   Noncompliance patient left AMA 3 days PTA post stroke, MI, cardiogenic shock and pneumonia  LVEF 20 - 25% per echo 6/16/2025.        Plan:    On heparin drip.  Pending TTE.  HD per nephrology.  Home O2 evaluation at [place].  Cardiology consult.  Nephrology consult.  ADULT DIET; Regular; Low Fat/Low Chol/High Fiber/2 gm Na; 2000 ml  Continuous Infusions:    sodium chloride      dextrose      heparin (PORCINE) Infusion 11 Units/kg/hr (06/16/25 0444)     sodium chloride flush, 5-40 mL, 2 times per day  insulin lispro, 0-8 Units, 4x Daily AC & HS  aspirin, 81 mg, Daily  [Held by provider] amLODIPine, 10 mg, Daily  budesonide-formoterol, 2 puff, BID RT  gabapentin, 400 mg, BID  [Held by provider] hydrALAZINE, 25 mg, TID  metoprolol tartrate, 25 mg, BID  pantoprazole, 40 mg, QAM AC  rosuvastatin, 40 mg, Nightly  sacubitril-valsartan, 1 tablet, BID  empagliflozin, 10 mg, Daily      Code Status:  Full Code  DVT prophylaxis: iv heparin.  IP CONSULT TO CARDIOLOGY  IP CONSULT TO NEPHROLOGY      /O (24Hr):    Intake/Output Summary (Last 24 hours) at 6/16/2025 1243  Last data filed at 6/16/2025 1215  Gross per 24 hour   Intake 400 ml   Output 2400 ml   Net -2000 ml       Patient Vitals for the past 96 hrs (Last 3 readings):   Weight   06/16/25 1215 86.9 kg (191 lb 9.3 oz)   06/16/25 0840 88.9 kg (195 lb 15.8 oz)   06/16/25 0330 87.8 kg (193 lb 9 oz)     Admission weight: 77.1 kg (170 lb)  Wt Readings from Last 3 Encounters:   06/16/25 86.9 kg (191 lb 9.3 oz)   10/11/24 78.3 kg (172 lb 9.6 oz)   11/02/23 82.6 kg (182 lb)    (encounters)    Vitals:    06/16/25 0330 06/16/25  every morning (before breakfast)  [DISCONTINUED] gabapentin (NEURONTIN) 400 MG capsule, Take 1 capsule by mouth 2 times daily for 60 days.  hydrALAZINE (APRESOLINE) 25 MG tablet, Take 1 tablet by mouth 3 times daily  [DISCONTINUED] amLODIPine (NORVASC) 10 MG tablet, take 1 tablet by mouth once daily  [DISCONTINUED] gemfibrozil (LOPID) 600 MG tablet, TAKE 1 TABLET BY MOUTH TWICE DAILY  [DISCONTINUED] pravastatin (PRAVACHOL) 40 MG tablet, Take 1 tablet by mouth daily  [DISCONTINUED] albuterol sulfate HFA (PROVENTIL;VENTOLIN;PROAIR) 108 (90 Base) MCG/ACT inhaler, Inhale 2 puffs into the lungs every 4 hours as needed for Wheezing inhale 2 puffs by mouth every 6 hours if needed for wheezing  [DISCONTINUED] silodosin (RAPAFLO) 4 MG CAPS capsule, take 1 capsule by mouth once daily IN THE EVENING  [DISCONTINUED] metoprolol tartrate (LOPRESSOR) 50 MG tablet, take 1 tablet by mouth twice a day  [DISCONTINUED] aspirin 325 MG EC tablet, take 1 tablet by mouth once daily (Patient taking differently: Take 81 mg by mouth daily)  [DISCONTINUED] dicyclomine (BENTYL) 20 MG tablet, Take 1 tablet by mouth 4 times daily (before meals and nightly)  glucose monitoring kit (FREESTYLE) monitoring kit, 1 kit by Does not apply route daily as needed  [DISCONTINUED] fluticasone-salmeterol (ADVAIR DISKUS) 250-50 MCG/DOSE AEPB, Inhale 1 puff into the lungs every 12 hours      ALLERGIES     is allergic to sitagliptin, sulfa antibiotics, cephalexin, cephalexin, and quinapril hcl.  Allergies   Allergen Reactions    Sitagliptin Swelling     Other reaction(s): AOF, Lips swell, Swelling    Sulfa Antibiotics Swelling     Other reaction(s): Swelling  Other reaction(s): AOF    Cephalexin Swelling    Cephalexin      Other reaction(s): Lips swell, U    Quinapril Hcl Swelling       SOCIAL HISTORY      reports that he quit smoking about 20 years ago. His smoking use included cigars and cigarettes. He started smoking about 44 years ago. He has a 30 pack-year  All CTs at this facility use dose modulation techniques and iterative reconstructions, and/or weight-based dosing when appropriate to reduce radiation to a low as reasonably achievable.    XR CHEST PORTABLE  Result Date: 2025  1 view chest x-ray Comparison: CR/SR - XR CHEST STANDARD (2 VW) - 21 02:39 EDT Findings: Low lung volumes. Reticular opacities at both lung bases. No effusion or pneumothorax. Normal size heart. Left internal jugular tunnel dialysis catheter in the SVC. No acute fracture.     Ill-defined bibasilar lung opacities are indeterminate. Although atelectasis is favored, this could represent a small amount of edema or bibasilar consolidation. This document has been electronically signed by: José Miguel Mclaughlin MD on 2025 12:46 AM    CR-XR Chest 1 View Portable IMPORT  Result Date: 2025  Images were obtained outside of [facility]    CR-XR Chest 1 View Portable IMPORT  Result Date: 2025  Images were obtained outside of [facility]    US-Echo procedure IMPORT  Result Date: 2025  Images were obtained outside of [facility]    KO-XR Chest 1 View Portable IMPORT  Result Date: 2025  Images were obtained outside of [facility]    CR-XR Chest 1 View Portable IMPORT  Result Date: 2025  Images were obtained outside of [facility]    KO-US Renal Bilateral IMPORT  Result Date: 2025  Images were obtained outside of [facility]    XR Chest 1 VW  Result Date: 2025  Ordering Provider: Jaime Cordon [facility]    Radiology Department  Patient:  JERRY NARANJO  [address]  :  1965   Sex:  Myrna Valenzuela [address]  Location:    [phone]   Unit #:   [id number]    Acct #:  [id number]    Ordering Phys: Jaime Butler DO      Exam Date: 25  Accession #:  [id number]  Exam:  MAIN   XR Chest 1 View Portable

## 2025-06-16 NOTE — ED PROVIDER NOTES
PATIENT NAME: Yaakov Kemp  MRN: [id number]  : 1965  GARDINER: 6/15/2025    I performed a history and physical examination of the patient and discussed management with the Resident. I reviewed the Resident's note and agree with the documented findings and plan of care. Any areas of disagreement are noted on the chart. I was personally present for the key portions of any procedures and have documented in the chart those procedures where I was not present during the key portions. I have reviewed the emergency nurses triage note and agree with the chief complaint, past medical history, past surgical history, allergies, medications, social and family history as documented unless otherwise noted below.    MEDICAL DEDISION MAKING (MDM)     Yaakov Kemp is a 59 y.o. male presents with chest pain, SOB, and fatigue fatigue.    Patient's PMH is remarkable for PNA, ESRD HD, CVA, DM, CHF, and moderate to severe AS.    He was seen at [facility] 4 days ago for chest pain and left AMA.     Hemodynamically stable.  Lab results are at his baselines.     High risk chest pain patient given patient's multiple comorbid's particularly moderate to severe AS.    Admission is warranted.    Admitted by hospital medicine service.         Vitals:    25 0012 25 0112 25 0129 25 0157   BP: 112/81 111/81  117/86   Pulse: 69 65 68 68   Resp:       Temp:       TempSrc:       SpO2:   100% 100%   Weight:         Labs Reviewed   BASIC METABOLIC PANEL - Abnormal; Notable for the following components:       Result Value    Sodium 129 (*)     Chloride 89 (*)     Glucose 227 (*)     BUN 48 (*)     Creatinine 7.4 (*)     Calcium 8.2 (*)     All other components within normal limits   BRAIN NATRIURETIC PEPTIDE - Abnormal; Notable for the following components:    NT Pro-BNP > 72252.0 (*)     All other components within normal limits   CBC WITH AUTO DIFFERENTIAL - Abnormal; Notable for the following components:    WBC 12.9 (*)

## 2025-06-16 NOTE — PLAN OF CARE
Problem: Chronic Conditions and Co-morbidities  Goal: Patient's chronic conditions and co-morbidity symptoms are monitored and maintained or improved  Outcome: Progressing  Flowsheets (Taken 6/16/2025 0517)  Care Plan - Patient's Chronic Conditions and Co-Morbidity Symptoms are Monitored and Maintained or Improved: Monitor and assess patient's chronic conditions and comorbid symptoms for stability, deterioration, or improvement     Problem: Discharge Planning  Goal: Discharge to home or other facility with appropriate resources  Outcome: Progressing  Flowsheets (Taken 6/16/2025 0517)  Discharge to home or other facility with appropriate resources: Identify barriers to discharge with patient and caregiver     Problem: Skin/Tissue Integrity  Goal: Skin integrity remains intact  Description: 1.  Monitor for areas of redness and/or skin breakdown  2.  Assess vascular access sites hourly  3.  Every 4-6 hours minimum:  Change oxygen saturation probe site  4.  Every 4-6 hours:  If on nasal continuous positive airway pressure, respiratory therapy assess nares and determine need for appliance change or resting period  Outcome: Progressing  Flowsheets (Taken 6/16/2025 0517)  Skin Integrity Remains Intact: Monitor for areas of redness and/or skin breakdown     Problem: Safety - Adult  Goal: Free from fall injury  Outcome: Progressing  Flowsheets (Taken 6/16/2025 0517)  Free From Fall Injury: Instruct family/caregiver on patient safety     Problem: ABCDS Injury Assessment  Goal: Absence of physical injury  Outcome: Progressing  Flowsheets (Taken 6/16/2025 0517)  Absence of Physical Injury: Implement safety measures based on patient assessment     Problem: Risk for Elopement  Goal: Patient will not exit the unit/facility without proper excort  Outcome: Progressing  Flowsheets  Taken 6/16/2025 0517  Nursing Interventions for Elopement Risk:   Assist with personal care needs such as toileting, eating, dressing, as needed to  reduce the risk of wandering   Collaborate with family members/caregivers to mitigate the elopement risk  Taken 6/16/2025 0500  Nursing Interventions for Elopement Risk:   Assist with personal care needs such as toileting, eating, dressing, as needed to reduce the risk of wandering   Collaborate with family members/caregivers to mitigate the elopement risk   Make sure patient has all necessary personal care items   Care plan reviewed with patient.  Patient verbalizes understanding of the plan of care and contributed to goal setting.

## 2025-06-16 NOTE — PROGRESS NOTES
Patient admitted to 4A Room 16 from ED and via cart/stretcher  Complaint upon arrival to the room none  IV site free of s/s of infection or infiltration.   Vital signs obtained. Assessment and data collection initiated. Oriented to room. Policies and procedures for  explained All questions answered with no further questions at this time. Fall prevention and safety brochure discussed with patient. 2 person skin check completed with Libby SAMSON         Name & number of designated person to update during visitor restriction times:     Was swallow screen completed on admission? [x] YES or [] NO  If patient failed swallow test, obtain order for speech therapy consult and keep NPO.

## 2025-06-16 NOTE — CONSULTS
[facility]'s  Consult    Patient's Name/Date of Birth: Yaakov Kemp / 1965 (59 y.o.)    Date: June 16, 2025     Referring Provider: Lexa Bermudez MD    CHIEF COMPLAINT: Chest pain      HPI: This is a pleasant 59 y.o. male with a PMHx of CAD, CHF with systolic dysfunction, IDDM 2, HTN, TIA who presented from home with chest pain.    Patient was recently admitted to [facility] for 2 weeks, initially presenting on 5/27 for pneumonia but found to have MRI and subsequently underwent left heart cath which revealed multivessel disease.  Patient was deemed to be a poor candidate for CABG at that time due to EF of 25%, subsequently had a LifeVest placed.  During that admission he also developed novel requirement for dialysis and had CVA.  Patient eventually left AMA because he did not want to be on the rehab floor, due to leaving AMA he was not discharged on GDMT or treatment for TIA.  At the time of admission he stated he was not taking his prescribed medications because he had left them at [facility].    Patient stated that chest discomfort was diffuse throughout chest and abdomen, specifically pointing to left side of sternum and right side of sternum, described as sharp/stabbing, rated as 10/10 in severity at zenith versus 7/10 at time of assessment.      Echo:   Results for orders placed or performed during the hospital encounter of 08/23/23   ECHO Complete 2D W Doppler W Color   Result Value Ref Range    Left Ventricular Ejection Fraction 50     LVEF MODALITY ECHO     Narrative    Transthoracic Echocardiography Report (TTE)     Demographics      Patient Name   MARCELLA EDWARDS Gender             Male                  C      MR #           [id number]         Race                                                      Ethnicity      Account #      [id number]         Room Number      Accession      [id number]        Date of Study      08/23/2023   Number      Date of Birth  1965

## 2025-06-17 LAB
ANION GAP SERPL CALC-SCNC: 15 MEQ/L (ref 8–16)
BUN SERPL-MCNC: 38 MG/DL (ref 8–23)
CALCIUM SERPL-MCNC: 8.5 MG/DL (ref 8.6–10)
CHLORIDE SERPL-SCNC: 95 MEQ/L (ref 98–111)
CO2 SERPL-SCNC: 26 MEQ/L (ref 22–29)
CREAT SERPL-MCNC: 6.2 MG/DL (ref 0.7–1.2)
EKG ATRIAL RATE: 71 BPM
EKG ATRIAL RATE: 71 BPM
EKG P AXIS: 56 DEGREES
EKG P AXIS: 56 DEGREES
EKG P-R INTERVAL: 184 MS
EKG P-R INTERVAL: 184 MS
EKG Q-T INTERVAL: 412 MS
EKG Q-T INTERVAL: 422 MS
EKG QRS DURATION: 100 MS
EKG QRS DURATION: 98 MS
EKG QTC CALCULATION (BAZETT): 447 MS
EKG QTC CALCULATION (BAZETT): 458 MS
EKG R AXIS: -160 DEGREES
EKG R AXIS: -170 DEGREES
EKG T AXIS: -48 DEGREES
EKG T AXIS: -54 DEGREES
EKG VENTRICULAR RATE: 71 BPM
EKG VENTRICULAR RATE: 71 BPM
FOLATE SERPL-MCNC: 8.3 NG/ML (ref 4.6–34.8)
GFR SERPL CREATININE-BSD FRML MDRD: 10 ML/MIN/1.73M2
GLUCOSE BLD STRIP.AUTO-MCNC: 137 MG/DL (ref 70–108)
GLUCOSE BLD STRIP.AUTO-MCNC: 138 MG/DL (ref 70–108)
GLUCOSE BLD STRIP.AUTO-MCNC: 276 MG/DL (ref 70–108)
GLUCOSE SERPL-MCNC: 122 MG/DL (ref 74–109)
HEPARIN UNFRACTIONATED: 0.22 U/ML (ref 0.3–0.7)
HEPARIN UNFRACTIONATED: 0.29 U/ML (ref 0.3–0.7)
HEPARIN UNFRACTIONATED: 0.41 U/ML (ref 0.3–0.7)
HEPARIN UNFRACTIONATED: 0.43 U/ML (ref 0.3–0.7)
POTASSIUM SERPL-SCNC: 5.3 MEQ/L (ref 3.5–5.2)
SODIUM SERPL-SCNC: 136 MEQ/L (ref 135–145)
VIT B12 SERPL-MCNC: 1229 PG/ML (ref 232–1245)

## 2025-06-17 PROCEDURE — 85520 HEPARIN ASSAY: CPT

## 2025-06-17 PROCEDURE — 6370000000 HC RX 637 (ALT 250 FOR IP)

## 2025-06-17 PROCEDURE — 1200000003 HC TELEMETRY R&B

## 2025-06-17 PROCEDURE — 6370000000 HC RX 637 (ALT 250 FOR IP): Performed by: INTERNAL MEDICINE

## 2025-06-17 PROCEDURE — 80048 BASIC METABOLIC PNL TOTAL CA: CPT

## 2025-06-17 PROCEDURE — 82948 REAGENT STRIP/BLOOD GLUCOSE: CPT

## 2025-06-17 PROCEDURE — P9047 ALBUMIN (HUMAN), 25%, 50ML: HCPCS | Performed by: INTERNAL MEDICINE

## 2025-06-17 PROCEDURE — 90935 HEMODIALYSIS ONE EVALUATION: CPT

## 2025-06-17 PROCEDURE — 99232 SBSQ HOSP IP/OBS MODERATE 35: CPT | Performed by: INTERNAL MEDICINE

## 2025-06-17 PROCEDURE — 93010 ELECTROCARDIOGRAM REPORT: CPT | Performed by: INTERNAL MEDICINE

## 2025-06-17 PROCEDURE — 6360000002 HC RX W HCPCS: Performed by: INTERNAL MEDICINE

## 2025-06-17 PROCEDURE — 6360000002 HC RX W HCPCS

## 2025-06-17 PROCEDURE — 94640 AIRWAY INHALATION TREATMENT: CPT

## 2025-06-17 PROCEDURE — 36415 COLL VENOUS BLD VENIPUNCTURE: CPT

## 2025-06-17 PROCEDURE — 82746 ASSAY OF FOLIC ACID SERUM: CPT

## 2025-06-17 PROCEDURE — 93005 ELECTROCARDIOGRAM TRACING: CPT | Performed by: INTERNAL MEDICINE

## 2025-06-17 PROCEDURE — 94761 N-INVAS EAR/PLS OXIMETRY MLT: CPT

## 2025-06-17 PROCEDURE — 2700000000 HC OXYGEN THERAPY PER DAY

## 2025-06-17 PROCEDURE — 82607 VITAMIN B-12: CPT

## 2025-06-17 RX ORDER — NITROGLYCERIN 20 MG/100ML
2.5-2 INJECTION INTRAVENOUS CONTINUOUS
Status: DISCONTINUED | OUTPATIENT
Start: 2025-06-17 | End: 2025-06-21 | Stop reason: HOSPADM

## 2025-06-17 RX ORDER — MIDODRINE HYDROCHLORIDE 5 MG/1
5 TABLET ORAL
Status: COMPLETED | OUTPATIENT
Start: 2025-06-17 | End: 2025-06-17

## 2025-06-17 RX ORDER — ALBUMIN (HUMAN) 12.5 G/50ML
25 SOLUTION INTRAVENOUS ONCE
Status: COMPLETED | OUTPATIENT
Start: 2025-06-17 | End: 2025-06-17

## 2025-06-17 RX ORDER — GABAPENTIN 100 MG/1
100 CAPSULE ORAL ONCE
Status: COMPLETED | OUTPATIENT
Start: 2025-06-17 | End: 2025-06-17

## 2025-06-17 RX ADMIN — ALTEPLASE 2 MG: 2.2 INJECTION, POWDER, LYOPHILIZED, FOR SOLUTION INTRAVENOUS at 12:06

## 2025-06-17 RX ADMIN — MIDODRINE HYDROCHLORIDE 5 MG: 5 TABLET ORAL at 12:12

## 2025-06-17 RX ADMIN — BUDESONIDE AND FORMOTEROL FUMARATE DIHYDRATE 2 PUFF: 160; 4.5 AEROSOL RESPIRATORY (INHALATION) at 09:07

## 2025-06-17 RX ADMIN — INSULIN LISPRO 4 UNITS: 100 INJECTION, SOLUTION INTRAVENOUS; SUBCUTANEOUS at 19:45

## 2025-06-17 RX ADMIN — HEPARIN SODIUM 2000 UNITS: 1000 INJECTION INTRAVENOUS; SUBCUTANEOUS at 08:48

## 2025-06-17 RX ADMIN — METOPROLOL TARTRATE 25 MG: 50 TABLET, FILM COATED ORAL at 08:14

## 2025-06-17 RX ADMIN — METOPROLOL TARTRATE 25 MG: 50 TABLET, FILM COATED ORAL at 19:46

## 2025-06-17 RX ADMIN — ALBUMIN (HUMAN) 25 G: 0.25 INJECTION, SOLUTION INTRAVENOUS at 13:12

## 2025-06-17 RX ADMIN — ALTEPLASE 2 MG: 2.2 INJECTION, POWDER, LYOPHILIZED, FOR SOLUTION INTRAVENOUS at 12:07

## 2025-06-17 RX ADMIN — HEPARIN SODIUM 17 UNITS/KG/HR: 10000 INJECTION, SOLUTION INTRAVENOUS at 02:49

## 2025-06-17 RX ADMIN — Medication 2.5 MCG/MIN: at 03:09

## 2025-06-17 RX ADMIN — ROSUVASTATIN CALCIUM 10 MG: 10 TABLET, FILM COATED ORAL at 19:46

## 2025-06-17 RX ADMIN — BUDESONIDE AND FORMOTEROL FUMARATE DIHYDRATE 2 PUFF: 160; 4.5 AEROSOL RESPIRATORY (INHALATION) at 21:53

## 2025-06-17 RX ADMIN — HEPARIN SODIUM 2000 UNITS: 1000 INJECTION INTRAVENOUS; SUBCUTANEOUS at 01:20

## 2025-06-17 RX ADMIN — PANTOPRAZOLE SODIUM 40 MG: 40 TABLET, DELAYED RELEASE ORAL at 06:41

## 2025-06-17 RX ADMIN — ASPIRIN 81 MG: 81 TABLET, CHEWABLE ORAL at 08:14

## 2025-06-17 RX ADMIN — HEPARIN SODIUM 19 UNITS/KG/HR: 10000 INJECTION, SOLUTION INTRAVENOUS at 17:07

## 2025-06-17 RX ADMIN — GABAPENTIN 100 MG: 100 CAPSULE ORAL at 22:32

## 2025-06-17 ASSESSMENT — PAIN SCALES - GENERAL
PAINLEVEL_OUTOF10: 8
PAINLEVEL_OUTOF10: 8
PAINLEVEL_OUTOF10: 4

## 2025-06-17 ASSESSMENT — PAIN - FUNCTIONAL ASSESSMENT
PAIN_FUNCTIONAL_ASSESSMENT: PREVENTS OR INTERFERES WITH ALL ACTIVE AND SOME PASSIVE ACTIVITIES
PAIN_FUNCTIONAL_ASSESSMENT: PREVENTS OR INTERFERES SOME ACTIVE ACTIVITIES AND ADLS

## 2025-06-17 ASSESSMENT — PAIN DESCRIPTION - PAIN TYPE
TYPE: ACUTE PAIN
TYPE: ACUTE PAIN

## 2025-06-17 ASSESSMENT — PAIN DESCRIPTION - ORIENTATION
ORIENTATION: MID
ORIENTATION: MID

## 2025-06-17 ASSESSMENT — PAIN DESCRIPTION - LOCATION
LOCATION: CHEST
LOCATION: CHEST

## 2025-06-17 ASSESSMENT — PAIN DESCRIPTION - FREQUENCY
FREQUENCY: CONTINUOUS
FREQUENCY: CONTINUOUS

## 2025-06-17 ASSESSMENT — PAIN DESCRIPTION - DESCRIPTORS
DESCRIPTORS: PRESSURE
DESCRIPTORS: PRESSURE

## 2025-06-17 ASSESSMENT — PAIN DESCRIPTION - ONSET: ONSET: ON-GOING

## 2025-06-17 NOTE — PROCEDURES
PROCEDURE NOTE  Date: 6/17/2025   Name: Yaakov Kemp  YOB: 1965    Procedures        EKG was completed and handed to RN

## 2025-06-17 NOTE — PALLIATIVE CARE
Initial Evaluation        Patient:   Yaakov Kemp  YOB: 1965  Age:  59 y.o.  Room:  [facility]16/016-  MRN:  [id number]   Acct: [id number]    Date of Admission:  6/15/2025 10:54 PM  Date of Service:  6/17/2025  Completed By:  Elisa Boudreaux RN        Reason for Palliative Care Evaluation:-   Goals of Care     Current Concerns   pain and shortness of breath     Palliative Performance Scale   40%  Mainly in bed; Extensive disease; Mainly assist; intake normal or reduced; LOC full/confusion     History    Patient with history of DM, CHF, EF 25-30%,moderate-severe AS, HTN, HLD, COPD, right BKA, and CVA.  Patient admitted with chest pain and SOB.     Goals of Care Discussions and Plan         Family/Patient Discussion:- Patient resting in bed and wife is at the bedside.  Palliative care introduced.  Patient is experiencing 10/10 midsternal chest pain and he c/o feeling very SOB with any movement.  In discussing goals of care, patient desires to be \"fixed\".  Discussed importance of compliance if any interventions are performed.  Emotional support provided.      Discussed case with Connor Mortimer PA-C cardiology.  Accompanied Brian into the patient's room.  Brian discussed transfer to [facility] with patient versus attempted stenting here if able.  Patient is receptive to [facility] as he reiterates that he can't live this way any longer and wants it \"fixed.  Did discuss option of comfort measures/hospice if no intervention desired and patient again expresses the desire for intervention if able.    Plan/Follow-Up:-   Updated primary RNQi.  Emotional support provided to wife Nadia outside of patient's room.  Palliative care contact information provided and Nadia is encouraged to call with any needs.  Palliative care will remain available and staff may call prn if needs arise.    Treatment Limitations: They would want to attempt to sustain life by all medically effective means.

## 2025-06-17 NOTE — PROCEDURES
PROCEDURE NOTE  Date: 6/16/2025   Name: Yaakov Kemp  YOB: 1965    Procedures        EKG was completed and handed to RN

## 2025-06-17 NOTE — PROGRESS NOTES
Renal Progress Note  6/17/2025 11:39 AM  Pt Name: Yaakov Kemp  MRN: [id number]  [id number]  YOB: 1965  Admit Date: 6/15/2025 10:54 PM  Date of evaluation: 6/17/2025  Primary Care Physician: No primary care provider on file.   4A-16/016-A       Subjective:     Interval History: complains of SOB   No chest pain   S/p IHD yesterday     Diet: ADULT DIET; Regular; Low Fat/Low Chol/High Fiber/2 gm Na; 2000 ml    Medications:   Scheduled Meds:   sodium chloride flush  5-40 mL IntraVENous 2 times per day    insulin lispro  0-8 Units SubCUTAneous 4x Daily AC & HS    aspirin  81 mg Oral Daily    [Held by provider] amLODIPine  10 mg Oral Daily    budesonide-formoterol  2 puff Inhalation BID RT    [Held by provider] hydrALAZINE  25 mg Oral TID    metoprolol tartrate  25 mg Oral BID    pantoprazole  40 mg Oral QAM AC    gabapentin  100 mg Oral Once per day on Monday Wednesday Friday    rosuvastatin  10 mg Oral Nightly     Continuous Infusions:   nitroGLYCERIN 22.5 mcg/min (06/17/25 0854)    sodium chloride      dextrose      heparin (PORCINE) Infusion 19 Units/kg/hr (06/17/25 0850)       Objective:   Vitals:   /82   Pulse 70   Temp 98 °F (36.7 °C) (Oral)   Resp 16   Ht 1.778 m (5' 10\")   Wt 86.3 kg (190 lb 4.1 oz)   SpO2 100%   BMI 27.30 kg/m²     I&O's:    Intake/Output Summary (Last 24 hours) at 6/17/2025 1139  Last data filed at 6/17/2025 0909  Gross per 24 hour   Intake 2416.5 ml   Output 3040 ml   Net -623.5 ml     I/O last 3 completed shifts:  In: 2116.5 [P.O.:1400; I.V.:316.5]  Out: 3040 [Urine:640]   Date 06/17/25 0000 - 06/17/25 2359   Shift 7460-2913 2133-8942 4580-7026 24 Hour Total   INTAKE   P.O.(mL/kg/hr) 500(0.7) 300  800   I.V.(mL/kg) 316.5(3.7)   316.5(3.7)   Shift Total(mL/kg) 816.5(9.5) 300(3.5)  1116.5(12.9)   OUTPUT   Shift Total(mL/kg)       Weight (kg) 86.3 86.3 86.3 86.3     General:  No acute distress  Neck: Supple, no JVD, no carotid bruits  Heart: Regular rhythm normal

## 2025-06-17 NOTE — FLOWSHEET NOTE
Unable to start treatment due to high arterial and venous pressures upon arrival. Activase instilled in HD catheter for 30 minutes. Activase removed. 3 treatment complete. Midodrine 5mg and Albumin 25g given with treatment. Removed 3 liters of fluid. Tolerated well. HD catheter ports flushed, locked with heparin, clamped, and capped. Dressing clean, dry and intact. Report given to primary RN. Treatment record printed to be scanned into EMR.   06/17/25 1250 06/17/25 1625   Vital Signs   /70 125/78   Temp 97.3 °F (36.3 °C) 97.5 °F (36.4 °C)   Pulse 68 66   Respirations 18 19   SpO2 96 % 97 %   Weight - Scale 86.3 kg (190 lb 4.1 oz) 83.3 kg (183 lb 10.3 oz)   Weight Method Bed scale Bed scale   Percent Weight Change  --  -3.48   Post-Hemodialysis Assessment   Post-Treatment Procedures  --  Blood returned;Catheter capped, clamped and heparinized x 2 ports   Machine Disinfection Process  --  Acid/Vinegar Clean;Heat Disinfect;Exterior Machine Disinfection   Blood Volume Processed (Liters)  --  44.1 L   Dialyzer Clearance  --  Lightly streaked   Duration of Treatment (minutes)  --  180 minutes   Heparin Amount Administered During Treatment (mL)  --  0 mL   Hemodialysis Intake (ml)  --  400 ml   Hemodialysis Output (ml)  --  3400 ml   NET Removed (ml)  --  3000

## 2025-06-17 NOTE — PROGRESS NOTES
[facility]  OCCUPATIONAL THERAPY MISSED TREATMENT NOTE  [facility] NEUROSCIENCES 4A  4A-16/016-A      Date: 2025  Patient Name: Yaakov Kemp        CSN: [id number]   : 1965  (59 y.o.)  Gender: male                REASON FOR MISSED TREATMENT: Hold treatment per nursing request.   Per communication with nursing, pt is currently having chest pain, on a nitro drip. Chest pain increases with exertion. Pt also has high troponins and is waiting on a cardiology consult. Recommend to hold today.

## 2025-06-17 NOTE — PROGRESS NOTES
Cardiology Progress Note      Patient:  Yaakov Kemp  YOB: 1965  MRN: [id number]   Acct: [id number]  Admit Date:  6/15/2025  Primary Cardiologist: :[facility]  Note per DR Minaya:  \"CHIEF COMPLAINT: Chest pain        HPI: This is a pleasant 59 y.o. male with a PMHx of CAD, CHF with systolic dysfunction, IDDM 2, HTN, TIA who presented from home with chest pain.    Patient was recently admitted to [facility] for 2 weeks, initially presenting on 5/27 for pneumonia but found to have MRI and subsequently underwent left heart cath which revealed multivessel disease.  Patient was deemed to be a poor candidate for CABG at that time due to EF of 25%, subsequently had a LifeVest placed.  During that admission he also developed novel requirement for dialysis and had CVA.  Patient eventually left AMA because he did not want to be on the rehab floor, due to leaving AMA he was not discharged on GDMT or treatment for TIA.  At the time of admission he stated he was not taking his prescribed medications because he had left them at [facility].     Patient stated that chest discomfort was diffuse throughout chest and abdomen, specifically pointing to left side of sternum and right side of sternum, described as sharp/stabbing, rated as 10/10 in severity at zenith versus 7/10 at time of assessment.\"    Subjective (Events in last 24 hours):   Pt awake, alert. Obviously SOB today sitting in bed, appears uncomfortable and ill. C/o 8-10/10 chest pain. Dr Vargas did review EKGs and cath films. Not a good candidate for PCI. Needs CABG referral. Agreeable to [facility] referral again. We will talk to neph about dialysis again today. Per DR Vargas, feels his sypmtoms are due to demand ischemia. Palliative was consulted. He reiterated with myself and Elisa wrasman present he wants everything done to help him currently.     Objective:   /82   Pulse 70   Temp 98 °F (36.7 °C) (Oral)   Resp 16   Ht 1.778 m (5' 10\")   Wt 86.3 kg (190 lb 4.1 oz)

## 2025-06-17 NOTE — CARE COORDINATION
6/17/25, 2:19 PM EDT    DISCHARGE ON GOING EVALUATION    [facility] day: 1  Location: -16/016-A Reason for admit: Chest pain [R07.9]  ESRD (end stage renal disease) (HCC) [N18.6]  Chest pain, unspecified type [R07.9]  Cardiomyopathy, unspecified type (HCC) [I42.9]     Procedures:   6/16 ECHO EF 20-25%    Imaging since last note: none    Barriers to Discharge: Palliative Care eval, Cardiology, Nephrology following, Pt is a known HD pt. Diabetes management, Heparin drip, Nitro drip, pain and nausea control, Protonix.    PCP: No primary care provider on file.  Readmission Risk Score: 21.7    Patient Goals/Plan/Treatment Preferences: From home with spouse. Plan is to transfer to [facility].

## 2025-06-17 NOTE — CONSULTS
CT/CV Surgery Consult Note    2025 1:33 PM  Surgeon:  Dr. Cm     Reason for Consult: CABG eval    CC:  Chest pain     HPI:    Mr. Kemp  is a 59 year old male with history of cardiomyopathy with multivessel CAD, DMII, ESRD, CVA. Patient presented to [facility] ED from home with chest pain yesterday 25. He was recently inpatient at [facility]. He was told by [facility] that he was not a candidate for high risk cardiac surgery due to cardiogenic shock. During his admission to [facility] he required dialysis and had a CVA. He left AMA because he did not want to be on the rehab floor. Patient has not taken his medications since signing out AMA from [facility].   Trop > 10K  TTE 25 LVEF 20-25%.   LHC from [facility] shows obstructive CAD.   EKG 25 Narrative & Impression  Normal sinus rhythm  Right superior axis deviation  Pulmonary disease pattern  T wave abnormality, consider inferior ischemia  Abnormal ECG  When compared with ECG of 2025 20:34,  No significant change was found       Vital Signs: /82   Pulse 70   Temp 98 °F (36.7 °C) (Oral)   Resp 16   Ht 1.778 m (5' 10\")   Wt 86.3 kg (190 lb 4.1 oz)   SpO2 100%   BMI 27.30 kg/m²    Temp (24hrs), Av °F (36.7 °C), Min:97.9 °F (36.6 °C), Max:98 °F (36.7 °C)      PULSE OXIMETRY RANGE: SpO2  Av.2 %  Min: 97 %  Max: 100 %    SUPPLEMENTAL O2: O2 Flow Rate (L/min): 3 L/min (wears 3lpm at home)     Labs:   CBC:     Recent Labs     06/15/25  2356 06/16/25  0432   WBC 12.9* 13.4*   HGB 8.1* 7.8*   HCT 26.6* 25.4*   .0* 100.0*    171   APTT  --  27.3   PROTIME  --  13.5   INR  --  1.18*     BMP:   Recent Labs     06/15/25  2356 25  0433 25  0447 25  0734 25  0831   * 132*  --  136  --    K 5.1 5.1  --  5.3*  --    CL 89* 91*  --  95*  --    CO2 24 24  --  26  --    BUN 48* 49*  --  38*  --    CREATININE 7.4* 7.5*  --  6.2*  --    MG 2.2  --   --   --   --    POCGLU  --   --    < >  --  137*    < > =  values in this interval not displayed.     Last HgA1C:   Lab Results   Component Value Date    LABA1C 5.5 06/16/2025       Imaging:  TTE: 6/16/25    Left Ventricle: Severely reduced left ventricular systolic function with a visually estimated EF of 20 - 25%. Left ventricle is moderately dilated. Normal wall thickness. See diagram for wall motion findings.    Right Ventricle: Right ventricle size is normal. Moderately reduced systolic function.    Aortic Valve: Trileaflet valve. Mildly thickened cusps. Mildly calcified cusps. No regurgitation. Mild stenosis of the aortic valve. AV mean gradient is 7 mmHg. AV area by continuity VTI is 1.3 cm2. AV Area by VTI is 1.3 cm2. AV Area by Peak Velocity is 1.3 cm2.    Mitral Valve: Mild regurgitation.    Left Atrium: Left atrium is moderately dilated.    Right Atrium: Right atrium is moderately dilated.    Aorta: Ascending Aorta is 3.3 cm.    IVC/SVC: IVC diameter is dilated and decreases less than 50% during inspiration; therefore the estimated right atrial pressure is elevated (~15 mmHg).    Image quality is adequate. Contrast used: Lumason.    Echo Findings    Left Ventricle Severely reduced left ventricular systolic function with a visually estimated EF of 20 - 25%. Left ventricle is moderately dilated. Normal wall thickness. See diagram for wall motion findings. Indeterminate diastolic function.   Left Atrium Left atrium is moderately dilated.   Right Ventricle Right ventricle size is normal. Moderately reduced systolic function.   Right Atrium Right atrium is moderately dilated.   Aortic Valve Trileaflet valve. Mildly thickened cusps. Mildly calcified cusps. No regurgitation. Mild stenosis of the aortic valve. AV mean gradient is 7 mmHg. AV area by continuity VTI is 1.3 cm2. AV Area by VTI is 1.3 cm2. AV Area by Peak Velocity is 1.3 cm2.   Mitral Valve Valve structure is normal. Mild regurgitation. No stenosis noted.   Tricuspid Valve Valve structure is normal. No

## 2025-06-17 NOTE — PROGRESS NOTES
[facility]--HOSPITALIST GROUP    Hospitalist PROGRESS NOTE dictated by Lexa Bermudez MD on 2025    Patient ID: Yaakov Kemp is 59 y.o. and presently in room -A  : 1965 MRN: [id number]    Admit date: 6/15/2025  Primary Care Physician: No primary care provider on file.   Patient Care Team:  Shu Marsh APRN - CNP as PCP - Empaneled Provider  Tel: None  Admitting Physician: No admitting provider for patient encounter.   Code Status:  Full Code    Yaakov Kemp is a 59 y.o.  male who presented with Fatigue and Shortness of Breath    Room:   Admit date: 6/15/2025    Per report: Yaakov Kemp is a 59 y.o. male with PMHx of CAD, HFrEF, IDDM 2, HTN, TIA who presents to [facility] from home with chest pain. Patient was admitted at [facility] for 2 weeks. Initially went in for pneumonia. Patient had MI and underwent LHC which found multivessel disease. Patient was deemed not a candidate for CABG at the time due to EF of 25%. Patient was given LifeVest. Patient also requiring new onset dialysis. Patient also had TIA. Patient decided to leave A because he did not want to be on the rehab floor. Patient was not sent home on GDMT or treatment for CVA. I consulted cards and nephro   --------    2025: Patient new ESRD on HD, CAD, HF R EF 25%, seen this morning at dialysis did not appear dyspneic although he stated he still has some shortness of breath.  No chest pain or cough.  On 3 L O2 nasal cannula.  --------------    2025: Patient with new ESRD on HD, CAD, LVEF 25%, LifeVest, DM, HTN, right BKA and recent TIA.  Patient on heparin and nitroglycerin drips for ischemic cardiomyopathy with elevated troponin level.    Patient seen this morning and states has intermittent chest pain but presently chest pain-free. Denies cough. Also has some edema shortness of breath requiring 2 to 3 L O2 nasal cannula.  0.083% nebulizer solution, Take 3 mLs by nebulization 3 times daily as needed for Wheezing  amiodarone (CORDARONE) 200 MG tablet, Take 1 tablet by mouth 2 times daily  budesonide (PULMICORT) 0.5 MG/2ML nebulizer suspension, Take 2 mLs by nebulization 2 times daily  docusate sodium (COLACE) 100 MG capsule, Take 1 capsule by mouth 2 times daily as needed for Constipation  vitamin D (ERGOCALCIFEROL) 1.25 MG (70617 UT) CAPS capsule, Take 1 capsule by mouth once a week  insulin glargine (LANTUS) 100 UNIT/ML injection vial, Inject 10 Units into the skin 2 times daily  ipratropium 0.5 mg-albuterol 2.5 mg (DUONEB) 0.5-2.5 (3) MG/3ML SOLN nebulizer solution, Inhale 3 mLs into the lungs every 4 hours as needed for Shortness of Breath  oxyCODONE 5 MG capsule, Take 1 capsule by mouth every 6 hours as needed for Pain. Max Daily Amount: 20 mg  polyethylene glycol (GLYCOLAX) 17 g packet, Take 1 packet by mouth daily as needed for Constipation  sodium bicarbonate 650 MG tablet, Take 1 tablet by mouth daily  pantoprazole (PROTONIX) 40 MG tablet, Take 1 tablet by mouth every morning (before breakfast)  [DISCONTINUED] gabapentin (NEURONTIN) 400 MG capsule, Take 1 capsule by mouth 2 times daily for 60 days.  hydrALAZINE (APRESOLINE) 25 MG tablet, Take 1 tablet by mouth 3 times daily  [DISCONTINUED] amLODIPine (NORVASC) 10 MG tablet, take 1 tablet by mouth once daily  [DISCONTINUED] gemfibrozil (LOPID) 600 MG tablet, TAKE 1 TABLET BY MOUTH TWICE DAILY  [DISCONTINUED] pravastatin (PRAVACHOL) 40 MG tablet, Take 1 tablet by mouth daily  [DISCONTINUED] albuterol sulfate HFA (PROVENTIL;VENTOLIN;PROAIR) 108 (90 Base) MCG/ACT inhaler, Inhale 2 puffs into the lungs every 4 hours as needed for Wheezing inhale 2 puffs by mouth every 6 hours if needed for wheezing  [DISCONTINUED] silodosin (RAPAFLO) 4 MG CAPS capsule, take 1 capsule by mouth once daily IN THE EVENING  [DISCONTINUED] metoprolol tartrate (LOPRESSOR) 50 MG tablet, take 1 tablet by

## 2025-06-17 NOTE — PROGRESS NOTES
[facility]  PHYSICAL THERAPY MISSED TREATMENT NOTE  [facility] NEUROSCIENCES 4A    Date: 2025  Patient Name: Yaakov Kemp        MRN: [id number]   : 1965  (59 y.o.)  Gender: male                REASON FOR MISSED TREATMENT:  Hold treatment per nursing request.   Per communication with nursing, pt is currently having chest pain, on a nitro drip. Chest pain increases with exertion. Pt also has high troponins and is waiting on a cardiology consult. Recommend to hold today.

## 2025-06-18 LAB
ANION GAP SERPL CALC-SCNC: 16 MEQ/L (ref 8–16)
BUN SERPL-MCNC: 30 MG/DL (ref 8–23)
CALCIUM SERPL-MCNC: 8.7 MG/DL (ref 8.6–10)
CHLORIDE SERPL-SCNC: 93 MEQ/L (ref 98–111)
CO2 SERPL-SCNC: 24 MEQ/L (ref 22–29)
CREAT SERPL-MCNC: 5.1 MG/DL (ref 0.7–1.2)
DEPRECATED RDW RBC AUTO: 60.9 FL (ref 35–45)
EKG ATRIAL RATE: 64 BPM
EKG ATRIAL RATE: 65 BPM
EKG P AXIS: 26 DEGREES
EKG P AXIS: 53 DEGREES
EKG P-R INTERVAL: 186 MS
EKG P-R INTERVAL: 196 MS
EKG Q-T INTERVAL: 424 MS
EKG Q-T INTERVAL: 452 MS
EKG QRS DURATION: 94 MS
EKG QRS DURATION: 96 MS
EKG QTC CALCULATION (BAZETT): 437 MS
EKG QTC CALCULATION (BAZETT): 470 MS
EKG R AXIS: -178 DEGREES
EKG R AXIS: 171 DEGREES
EKG T AXIS: -153 DEGREES
EKG T AXIS: 51 DEGREES
EKG VENTRICULAR RATE: 64 BPM
EKG VENTRICULAR RATE: 65 BPM
ERYTHROCYTE [DISTWIDTH] IN BLOOD BY AUTOMATED COUNT: 16.9 % (ref 11.5–14.5)
GFR SERPL CREATININE-BSD FRML MDRD: 12 ML/MIN/1.73M2
GLUCOSE BLD STRIP.AUTO-MCNC: 127 MG/DL (ref 70–108)
GLUCOSE BLD STRIP.AUTO-MCNC: 147 MG/DL (ref 70–108)
GLUCOSE BLD STRIP.AUTO-MCNC: 204 MG/DL (ref 70–108)
GLUCOSE SERPL-MCNC: 158 MG/DL (ref 74–109)
HCT VFR BLD AUTO: 25.9 % (ref 42–52)
HEPARIN UNFRACTIONATED: 0.35 U/ML (ref 0.3–0.7)
HEPARIN UNFRACTIONATED: 0.36 U/ML (ref 0.3–0.7)
HEPARIN UNFRACTIONATED: 0.37 U/ML (ref 0.3–0.7)
HGB BLD-MCNC: 7.7 GM/DL (ref 14–18)
MCH RBC QN AUTO: 30 PG (ref 26–33)
MCHC RBC AUTO-ENTMCNC: 29.7 GM/DL (ref 32.2–35.5)
MCV RBC AUTO: 100.8 FL (ref 80–94)
PLATELET # BLD AUTO: 159 THOU/MM3 (ref 130–400)
PMV BLD AUTO: 11.3 FL (ref 9.4–12.4)
POTASSIUM SERPL-SCNC: 5 MEQ/L (ref 3.5–5.2)
RBC # BLD AUTO: 2.57 MILL/MM3 (ref 4.7–6.1)
SODIUM SERPL-SCNC: 133 MEQ/L (ref 135–145)
TSH SERPL DL<=0.05 MIU/L-ACNC: 3.17 UIU/ML (ref 0.27–4.2)
WBC # BLD AUTO: 10.2 THOU/MM3 (ref 4.8–10.8)

## 2025-06-18 PROCEDURE — 93005 ELECTROCARDIOGRAM TRACING: CPT | Performed by: INTERNAL MEDICINE

## 2025-06-18 PROCEDURE — 6370000000 HC RX 637 (ALT 250 FOR IP)

## 2025-06-18 PROCEDURE — 6360000002 HC RX W HCPCS: Performed by: INTERNAL MEDICINE

## 2025-06-18 PROCEDURE — 6370000000 HC RX 637 (ALT 250 FOR IP): Performed by: INTERNAL MEDICINE

## 2025-06-18 PROCEDURE — 1200000003 HC TELEMETRY R&B

## 2025-06-18 PROCEDURE — 85027 COMPLETE CBC AUTOMATED: CPT

## 2025-06-18 PROCEDURE — 90935 HEMODIALYSIS ONE EVALUATION: CPT

## 2025-06-18 PROCEDURE — 36415 COLL VENOUS BLD VENIPUNCTURE: CPT

## 2025-06-18 PROCEDURE — 93010 ELECTROCARDIOGRAM REPORT: CPT | Performed by: INTERNAL MEDICINE

## 2025-06-18 PROCEDURE — 85520 HEPARIN ASSAY: CPT

## 2025-06-18 PROCEDURE — 84443 ASSAY THYROID STIM HORMONE: CPT

## 2025-06-18 PROCEDURE — 6360000002 HC RX W HCPCS: Performed by: NURSE PRACTITIONER

## 2025-06-18 PROCEDURE — 80048 BASIC METABOLIC PNL TOTAL CA: CPT

## 2025-06-18 PROCEDURE — 99232 SBSQ HOSP IP/OBS MODERATE 35: CPT | Performed by: INTERNAL MEDICINE

## 2025-06-18 PROCEDURE — 94640 AIRWAY INHALATION TREATMENT: CPT

## 2025-06-18 PROCEDURE — 82948 REAGENT STRIP/BLOOD GLUCOSE: CPT

## 2025-06-18 PROCEDURE — 6360000002 HC RX W HCPCS

## 2025-06-18 RX ORDER — MORPHINE SULFATE 2 MG/ML
2 INJECTION, SOLUTION INTRAMUSCULAR; INTRAVENOUS ONCE
Status: COMPLETED | OUTPATIENT
Start: 2025-06-18 | End: 2025-06-18

## 2025-06-18 RX ADMIN — PANTOPRAZOLE SODIUM 40 MG: 40 TABLET, DELAYED RELEASE ORAL at 05:27

## 2025-06-18 RX ADMIN — BUDESONIDE AND FORMOTEROL FUMARATE DIHYDRATE 2 PUFF: 160; 4.5 AEROSOL RESPIRATORY (INHALATION) at 19:57

## 2025-06-18 RX ADMIN — ACETAMINOPHEN 650 MG: 325 TABLET ORAL at 20:17

## 2025-06-18 RX ADMIN — ROSUVASTATIN CALCIUM 10 MG: 10 TABLET, FILM COATED ORAL at 20:17

## 2025-06-18 RX ADMIN — METOPROLOL TARTRATE 25 MG: 50 TABLET, FILM COATED ORAL at 11:43

## 2025-06-18 RX ADMIN — HEPARIN SODIUM 19 UNITS/KG/HR: 10000 INJECTION, SOLUTION INTRAVENOUS at 11:40

## 2025-06-18 RX ADMIN — HYDROXYZINE HYDROCHLORIDE 50 MG: 25 TABLET, FILM COATED ORAL at 05:27

## 2025-06-18 RX ADMIN — MORPHINE SULFATE 2 MG: 2 INJECTION, SOLUTION INTRAMUSCULAR; INTRAVENOUS at 15:58

## 2025-06-18 RX ADMIN — GABAPENTIN 100 MG: 100 CAPSULE ORAL at 17:58

## 2025-06-18 RX ADMIN — LIDOCAINE HYDROCHLORIDE: 20 SOLUTION ORAL at 06:41

## 2025-06-18 RX ADMIN — HYDROXYZINE HYDROCHLORIDE 50 MG: 25 TABLET, FILM COATED ORAL at 20:17

## 2025-06-18 RX ADMIN — METOPROLOL TARTRATE 25 MG: 50 TABLET, FILM COATED ORAL at 20:17

## 2025-06-18 RX ADMIN — INSULIN LISPRO 2 UNITS: 100 INJECTION, SOLUTION INTRAVENOUS; SUBCUTANEOUS at 17:58

## 2025-06-18 RX ADMIN — ASPIRIN 81 MG: 81 TABLET, CHEWABLE ORAL at 11:44

## 2025-06-18 RX ADMIN — Medication 22.5 MCG/MIN: at 13:02

## 2025-06-18 ASSESSMENT — PAIN SCALES - GENERAL
PAINLEVEL_OUTOF10: 8
PAINLEVEL_OUTOF10: 3
PAINLEVEL_OUTOF10: 10
PAINLEVEL_OUTOF10: 8

## 2025-06-18 ASSESSMENT — PAIN DESCRIPTION - LOCATION
LOCATION: CHEST

## 2025-06-18 ASSESSMENT — PAIN DESCRIPTION - ORIENTATION
ORIENTATION: MID
ORIENTATION: MID

## 2025-06-18 ASSESSMENT — PAIN - FUNCTIONAL ASSESSMENT
PAIN_FUNCTIONAL_ASSESSMENT: PREVENTS OR INTERFERES SOME ACTIVE ACTIVITIES AND ADLS
PAIN_FUNCTIONAL_ASSESSMENT: ACTIVITIES ARE NOT PREVENTED

## 2025-06-18 ASSESSMENT — PAIN DESCRIPTION - PAIN TYPE
TYPE: ACUTE PAIN
TYPE: ACUTE PAIN

## 2025-06-18 ASSESSMENT — PAIN DESCRIPTION - DESCRIPTORS
DESCRIPTORS: DULL
DESCRIPTORS: PRESSURE;CRUSHING

## 2025-06-18 ASSESSMENT — PAIN DESCRIPTION - FREQUENCY: FREQUENCY: CONTINUOUS

## 2025-06-18 ASSESSMENT — PAIN SCALES - WONG BAKER: WONGBAKER_NUMERICALRESPONSE: HURTS A LITTLE BIT

## 2025-06-18 NOTE — PROCEDURES
PROCEDURE NOTE  Date: 6/18/2025   Name: Yaaokv Kemp  YOB: 1965    Procedures    EKG Completed. Handed to RN.

## 2025-06-18 NOTE — PROGRESS NOTES
[facility]  PHYSICAL THERAPY MISSED TREATMENT NOTE  [facility] NEUROSCIENCES 4A    Date: 2025  Patient Name: Yaakov Kemp        MRN: [id number]   : 1965  (59 y.o.)  Gender: male                REASON FOR MISSED TREATMENT:  Patient at testing and/or off unit.  Pt off the floor for dialysis. Per RN possible t/f to [facility]. Will check back in pm as time allows.

## 2025-06-18 NOTE — PROGRESS NOTES
[facility]  OCCUPATIONAL THERAPY MISSED TREATMENT NOTE  [facility] NEUROSCIENCES 4A  4A-16/016-A      Date: 2025  Patient Name: Yaakov Kemp        CSN: [id number]   : 1965  (59 y.o.)  Gender: male                REASON FOR MISSED TREATMENT: Pt off the floor for dialysis. Per RN possible t/f to [facility]. Will check back in pm as time allows.

## 2025-06-18 NOTE — PROGRESS NOTES
[facility]--HOSPITALIST GROUP    Hospitalist PROGRESS NOTE dictated by Lexa Bermudez MD on 2025    Patient ID: Yaakov Kemp is 59 y.o. and presently in room -A  : 1965 MRN: [id number]    Admit date: 6/15/2025  Primary Care Physician: No primary care provider on file.   Patient Care Team:  Shu Marsh APRN - CNP as PCP - Empaneled Provider  Tel: None  Admitting Physician: No admitting provider for patient encounter.   Code Status:  Full Code    Yaakov Kemp is a 59 y.o.  male who presented with Fatigue and Shortness of Breath    Room: -  Admit date: 6/15/2025    Per report: Yaakov Kemp is a 59 y.o. male with PMHx of CAD, HFrEF, IDDM 2, HTN, TIA who presents to [facility] from home with chest pain. Patient was admitted at [facility] for 2 weeks. Initially went in for pneumonia. Patient had MI and underwent LHC which found multivessel disease. Patient was deemed not a candidate for CABG at the time due to EF of 25%. Patient was given LifeVest. Patient also requiring new onset dialysis. Patient also had TIA. Patient decided to leave A because he did not want to be on the rehab floor. Patient was not sent home on GDMT or treatment for CVA. I consulted cards and nephro   --------    2025: Patient new ESRD on HD, CAD, HF R EF 25%, seen this morning at dialysis did not appear dyspneic although he stated he still has some shortness of breath.  No chest pain or cough.  On 3 L O2 nasal cannula.  --------------    2025: Patient with new ESRD on HD, CAD, LVEF 25%, LifeVest, DM, HTN, right BKA and recent TIA.  Patient on heparin and nitroglycerin drips for ischemic cardiomyopathy with elevated troponin level.    Patient seen this morning and states has intermittent chest pain but presently chest pain-free. Denies cough. Also has some edema shortness of breath requiring 2 to 3 L O2 nasal cannula.  moderate    Sciatica    Ulcer of foot (HCC)    Otitis externa    Abnormal EKG    Ex-smoker for more than 1 year    Family history of premature CAD    GARDINER (dyspnea on exertion)    CKD (chronic kidney disease), stage IV (HCC)    History of diabetes mellitus    Metabolic acidosis    Increased pressure in the eye, right    Glaucoma of right eye    Ulcer of foot due to type 2 diabetes mellitus (HCC)    Vitreous hemorrhage (HCC)    Chest pain    ESRD (end stage renal disease) (HCC)    Cardiomyopathy (HCC)       PAST MEDICAL HISTORY    has a past medical history of Abscess of leg, Allergic rhinitis, Asthma, Blood circulation, collateral, DM2 (diabetes mellitus, type 2) (HCC), GERD (gastroesophageal reflux disease), Headache(784.0), Hyperlipidemia, Hypertension, Neuropathy, Osteoarthritis, Other disorders of kidney and ureter, Pneumonia, and Type II or unspecified type diabetes mellitus without mention of complication, not stated as uncontrolled.    SURGICAL HISTORY      has a past surgical history that includes Foot surgery (Right, 2008); Hand surgery (Left, 2010); Appendectomy (5/31/12); vascular surgery; Cardiac catheterization (2007); Arm Surgery; and Colonoscopy (Left, 2/26/2018).    CURRENT MEDICATIONS     sodium chloride flush, 5-40 mL, 2 times per day  insulin lispro, 0-8 Units, 4x Daily AC & HS  aspirin, 81 mg, Daily  [Held by provider] amLODIPine, 10 mg, Daily  budesonide-formoterol, 2 puff, BID RT  [Held by provider] hydrALAZINE, 25 mg, TID  metoprolol tartrate, 25 mg, BID  pantoprazole, 40 mg, QAM AC  gabapentin, 100 mg, Once per day on Monday Wednesday Friday  rosuvastatin, 10 mg, Nightly        Continuous Infusions:    nitroGLYCERIN 22.5 mcg/min (06/17/25 0854)    sodium chloride      dextrose      heparin (PORCINE) Infusion 19 Units/kg/hr (06/17/25 1705)       PRN:  sodium chloride flush, 5-40 mL, PRN  sodium chloride, , PRN  ondansetron, 4 mg, Q8H PRN   Or  ondansetron, 4 mg, Q6H PRN  polyethylene glycol, 17

## 2025-06-18 NOTE — PLAN OF CARE
Problem: Chronic Conditions and Co-morbidities  Goal: Patient's chronic conditions and co-morbidity symptoms are monitored and maintained or improved  Outcome: Progressing  Flowsheets (Taken 6/18/2025 1036)  Care Plan - Patient's Chronic Conditions and Co-Morbidity Symptoms are Monitored and Maintained or Improved:   Monitor and assess patient's chronic conditions and comorbid symptoms for stability, deterioration, or improvement   Collaborate with multidisciplinary team to address chronic and comorbid conditions and prevent exacerbation or deterioration   Update acute care plan with appropriate goals if chronic or comorbid symptoms are exacerbated and prevent overall improvement and discharge     Problem: Discharge Planning  Goal: Discharge to home or other facility with appropriate resources  Outcome: Progressing  Flowsheets (Taken 6/18/2025 1036)  Discharge to home or other facility with appropriate resources:   Identify barriers to discharge with patient and caregiver   Arrange for needed discharge resources and transportation as appropriate   Identify discharge learning needs (meds, wound care, etc)   Arrange for interpreters to assist at discharge as needed   Refer to discharge planning if patient needs post-hospital services based on physician order or complex needs related to functional status, cognitive ability or social support system     Problem: Skin/Tissue Integrity  Goal: Skin integrity remains intact  Description: 1.  Monitor for areas of redness and/or skin breakdown  2.  Assess vascular access sites hourly  3.  Every 4-6 hours minimum:  Change oxygen saturation probe site  4.  Every 4-6 hours:  If on nasal continuous positive airway pressure, respiratory therapy assess nares and determine need for appliance change or resting period  Outcome: Progressing  Flowsheets (Taken 6/18/2025 1036)  Skin Integrity Remains Intact:   Monitor for areas of redness and/or skin breakdown   Turn and reposition as  unsuccessful or patient reports new pain     Problem: Cardiovascular - Adult  Goal: Maintains optimal cardiac output and hemodynamic stability  Outcome: Progressing  Flowsheets (Taken 6/18/2025 1036)  Maintains optimal cardiac output and hemodynamic stability:   Monitor blood pressure and heart rate   Assess for signs of decreased cardiac output     Problem: Cardiovascular - Adult  Goal: Absence of cardiac dysrhythmias or at baseline  Outcome: Progressing  Flowsheets (Taken 6/18/2025 1036)  Absence of cardiac dysrhythmias or at baseline:   Monitor cardiac rate and rhythm   Assess for signs of decreased cardiac output     Care plan reviewed with patient. Patient verbalizes understanding of the plan of care and contributed to goal setting.

## 2025-06-18 NOTE — PROGRESS NOTES
Renal Progress Note  6/18/2025 10:09 AM  Pt Name: Yaakov Kemp  MRN: [id number]  [id number]  YOB: 1965  Admit Date: 6/15/2025 10:54 PM  Date of evaluation: 6/18/2025  Primary Care Physician: No primary care provider on file.   4A-16/016-A       Subjective:     Feeling better today   SOB has been improved   Pt was seen and examined on IHD  , he is tolerating the procedure     Diet: ADULT DIET; Regular; Low Fat/Low Chol/High Fiber/2 gm Na; 2000 ml    Medications:   Scheduled Meds:   sodium chloride flush  5-40 mL IntraVENous 2 times per day    insulin lispro  0-8 Units SubCUTAneous 4x Daily AC & HS    aspirin  81 mg Oral Daily    [Held by provider] amLODIPine  10 mg Oral Daily    budesonide-formoterol  2 puff Inhalation BID RT    [Held by provider] hydrALAZINE  25 mg Oral TID    metoprolol tartrate  25 mg Oral BID    pantoprazole  40 mg Oral QAM AC    gabapentin  100 mg Oral Once per day on Monday Wednesday Friday    rosuvastatin  10 mg Oral Nightly     Continuous Infusions:   nitroGLYCERIN 22.5 mcg/min (06/17/25 0854)    sodium chloride      dextrose      heparin (PORCINE) Infusion 19 Units/kg/hr (06/17/25 1707)       Objective:   Vitals:   /65   Pulse 61   Temp 98.1 °F (36.7 °C) (Oral)   Resp 22   Ht 1.778 m (5' 10\")   Wt 83.3 kg (183 lb 10.3 oz)   SpO2 100%   BMI 26.35 kg/m²     I&O's:    Intake/Output Summary (Last 24 hours) at 6/18/2025 1009  Last data filed at 6/17/2025 1625  Gross per 24 hour   Intake 400 ml   Output 3400 ml   Net -3000 ml     I/O last 3 completed shifts:  In: 1516.5 [P.O.:800; I.V.:316.5]  Out: 3400        General:  No acute distress  Neck: Supple, no JVD, no carotid bruits  Heart: Regular rhythm normal S1 and S2, no rubs, murmurs or gallops.  LifeVest in place  Lungs: clear to ascultation no rales, wheezes, or rhonchi  Abdomen: positive bowel sounds, soft, non-tender, non-distended, no bruits, no masses  Extremities: S/p right BKA.  Other extremities revealing

## 2025-06-18 NOTE — FLOWSHEET NOTE
06/18/25 0730 06/18/25 1124   Vital Signs   /69 122/83   Temp 97.6 °F (36.4 °C) 98.2 °F (36.8 °C)   Pulse 68 68   Respirations 18 18   SpO2 99 % 99 %   Weight - Scale 85.5 kg (188 lb 7.9 oz) 82.5 kg (181 lb 14.1 oz)   Weight Method Bed scale Bed scale   Percent Weight Change 2.64 -0.96   Post-Hemodialysis Assessment   Post-Treatment Procedures  --  Blood returned;Catheter Capped, clamped with Saline x2 ports   Machine Disinfection Process  --  Acid/Vinegar Clean;Heat Disinfect;Exterior Machine Disinfection   Blood Volume Processed (Liters)  --  56 L   Dialyzer Clearance  --  Lightly streaked   Duration of Treatment (minutes)  --  210 minutes   Hemodialysis Intake (ml)  --  400 ml   Hemodialysis Output (ml)  --  3400 ml   NET Removed (ml)  --  3000   Tolerated Treatment  --  Good     Stable 3.5 hour treatment complete. Removed 2 liters of fluid. Tolerated well. HD catheter ports flushed, clamped and capped. Dressing clean, dry and intact. Report given to primary RN. Treatment record printed to be scanned into EMR.

## 2025-06-18 NOTE — PROCEDURES
PROCEDURE NOTE  Date: 6/18/2025   Name: Yaakov Kemp  YOB: 1965    Procedures EKG completed, given to RN

## 2025-06-19 ENCOUNTER — APPOINTMENT (OUTPATIENT)
Dept: GENERAL RADIOLOGY | Age: 60
End: 2025-06-19
Payer: COMMERCIAL

## 2025-06-19 LAB
ANION GAP SERPL CALC-SCNC: 16 MEQ/L (ref 8–16)
BUN SERPL-MCNC: 27 MG/DL (ref 8–23)
CALCIUM SERPL-MCNC: 8.4 MG/DL (ref 8.6–10)
CHLORIDE SERPL-SCNC: 94 MEQ/L (ref 98–111)
CO2 SERPL-SCNC: 23 MEQ/L (ref 22–29)
CREAT SERPL-MCNC: 4.6 MG/DL (ref 0.7–1.2)
DEPRECATED RDW RBC AUTO: 61.6 FL (ref 35–45)
ERYTHROCYTE [DISTWIDTH] IN BLOOD BY AUTOMATED COUNT: 16.9 % (ref 11.5–14.5)
FERRITIN SERPL IA-MCNC: 183 NG/ML (ref 30–400)
GFR SERPL CREATININE-BSD FRML MDRD: 14 ML/MIN/1.73M2
GLUCOSE BLD STRIP.AUTO-MCNC: 130 MG/DL (ref 70–108)
GLUCOSE BLD STRIP.AUTO-MCNC: 166 MG/DL (ref 70–108)
GLUCOSE BLD STRIP.AUTO-MCNC: 196 MG/DL (ref 70–108)
GLUCOSE BLD STRIP.AUTO-MCNC: 201 MG/DL (ref 70–108)
GLUCOSE BLD STRIP.AUTO-MCNC: 247 MG/DL (ref 70–108)
GLUCOSE SERPL-MCNC: 129 MG/DL (ref 74–109)
HCT VFR BLD AUTO: 23.6 % (ref 42–52)
HEPARIN UNFRACTIONATED: 0.29 U/ML (ref 0.3–0.7)
HEPARIN UNFRACTIONATED: 0.4 U/ML (ref 0.3–0.7)
HEPARIN UNFRACTIONATED: 0.47 U/ML (ref 0.3–0.7)
HGB BLD-MCNC: 7.1 GM/DL (ref 14–18)
IRON SATN MFR SERPL: 18 % (ref 20–50)
IRON SERPL-MCNC: 36 UG/DL (ref 61–157)
MCH RBC QN AUTO: 30.2 PG (ref 26–33)
MCHC RBC AUTO-ENTMCNC: 30.1 GM/DL (ref 32.2–35.5)
MCV RBC AUTO: 100.4 FL (ref 80–94)
PLATELET # BLD AUTO: 116 THOU/MM3 (ref 130–400)
PMV BLD AUTO: 11.9 FL (ref 9.4–12.4)
POTASSIUM SERPL-SCNC: 4.7 MEQ/L (ref 3.5–5.2)
RBC # BLD AUTO: 2.35 MILL/MM3 (ref 4.7–6.1)
SODIUM SERPL-SCNC: 133 MEQ/L (ref 135–145)
TIBC SERPL-MCNC: 202 UG/DL (ref 171–450)
WBC # BLD AUTO: 9.5 THOU/MM3 (ref 4.8–10.8)

## 2025-06-19 PROCEDURE — 6370000000 HC RX 637 (ALT 250 FOR IP)

## 2025-06-19 PROCEDURE — 80048 BASIC METABOLIC PNL TOTAL CA: CPT

## 2025-06-19 PROCEDURE — 99232 SBSQ HOSP IP/OBS MODERATE 35: CPT | Performed by: INTERNAL MEDICINE

## 2025-06-19 PROCEDURE — 82948 REAGENT STRIP/BLOOD GLUCOSE: CPT

## 2025-06-19 PROCEDURE — 94761 N-INVAS EAR/PLS OXIMETRY MLT: CPT

## 2025-06-19 PROCEDURE — 85520 HEPARIN ASSAY: CPT

## 2025-06-19 PROCEDURE — 2580000003 HC RX 258: Performed by: INTERNAL MEDICINE

## 2025-06-19 PROCEDURE — 85027 COMPLETE CBC AUTOMATED: CPT

## 2025-06-19 PROCEDURE — 6360000002 HC RX W HCPCS: Performed by: INTERNAL MEDICINE

## 2025-06-19 PROCEDURE — 83550 IRON BINDING TEST: CPT

## 2025-06-19 PROCEDURE — 82728 ASSAY OF FERRITIN: CPT

## 2025-06-19 PROCEDURE — 71045 X-RAY EXAM CHEST 1 VIEW: CPT

## 2025-06-19 PROCEDURE — 83540 ASSAY OF IRON: CPT

## 2025-06-19 PROCEDURE — 94640 AIRWAY INHALATION TREATMENT: CPT

## 2025-06-19 PROCEDURE — 97166 OT EVAL MOD COMPLEX 45 MIN: CPT

## 2025-06-19 PROCEDURE — 2700000000 HC OXYGEN THERAPY PER DAY

## 2025-06-19 PROCEDURE — 1200000003 HC TELEMETRY R&B

## 2025-06-19 PROCEDURE — 6370000000 HC RX 637 (ALT 250 FOR IP): Performed by: INTERNAL MEDICINE

## 2025-06-19 PROCEDURE — 36415 COLL VENOUS BLD VENIPUNCTURE: CPT

## 2025-06-19 PROCEDURE — 97535 SELF CARE MNGMENT TRAINING: CPT

## 2025-06-19 PROCEDURE — 6360000002 HC RX W HCPCS

## 2025-06-19 RX ORDER — FERROUS SULFATE 325(65) MG
325 TABLET ORAL EVERY OTHER DAY
Status: DISCONTINUED | OUTPATIENT
Start: 2025-06-19 | End: 2025-06-21 | Stop reason: HOSPADM

## 2025-06-19 RX ADMIN — METOPROLOL TARTRATE 25 MG: 50 TABLET, FILM COATED ORAL at 19:42

## 2025-06-19 RX ADMIN — Medication 70 MCG/MIN: at 02:02

## 2025-06-19 RX ADMIN — HEPARIN SODIUM 21 UNITS/KG/HR: 10000 INJECTION, SOLUTION INTRAVENOUS at 17:25

## 2025-06-19 RX ADMIN — Medication 70 MCG/MIN: at 14:18

## 2025-06-19 RX ADMIN — HEPARIN SODIUM 2000 UNITS: 1000 INJECTION INTRAVENOUS; SUBCUTANEOUS at 04:32

## 2025-06-19 RX ADMIN — PANTOPRAZOLE SODIUM 40 MG: 40 TABLET, DELAYED RELEASE ORAL at 06:26

## 2025-06-19 RX ADMIN — BUDESONIDE AND FORMOTEROL FUMARATE DIHYDRATE 2 PUFF: 160; 4.5 AEROSOL RESPIRATORY (INHALATION) at 07:38

## 2025-06-19 RX ADMIN — HEPARIN SODIUM 19 UNITS/KG/HR: 10000 INJECTION, SOLUTION INTRAVENOUS at 02:00

## 2025-06-19 RX ADMIN — INSULIN LISPRO 2 UNITS: 100 INJECTION, SOLUTION INTRAVENOUS; SUBCUTANEOUS at 17:51

## 2025-06-19 RX ADMIN — BUDESONIDE AND FORMOTEROL FUMARATE DIHYDRATE 2 PUFF: 160; 4.5 AEROSOL RESPIRATORY (INHALATION) at 21:35

## 2025-06-19 RX ADMIN — FERROUS SULFATE TAB 325 MG (65 MG ELEMENTAL FE) 325 MG: 325 (65 FE) TAB at 11:56

## 2025-06-19 RX ADMIN — IRON SUCROSE 200 MG: 20 INJECTION, SOLUTION INTRAVENOUS at 12:14

## 2025-06-19 RX ADMIN — ROSUVASTATIN CALCIUM 10 MG: 10 TABLET, FILM COATED ORAL at 19:42

## 2025-06-19 RX ADMIN — ASPIRIN 81 MG: 81 TABLET, CHEWABLE ORAL at 08:28

## 2025-06-19 ASSESSMENT — PAIN DESCRIPTION - ORIENTATION: ORIENTATION: MID

## 2025-06-19 ASSESSMENT — PAIN DESCRIPTION - FREQUENCY: FREQUENCY: CONTINUOUS

## 2025-06-19 ASSESSMENT — PAIN SCALES - WONG BAKER
WONGBAKER_NUMERICALRESPONSE: NO HURT
WONGBAKER_NUMERICALRESPONSE: HURTS A LITTLE BIT
WONGBAKER_NUMERICALRESPONSE: HURTS A LITTLE BIT
WONGBAKER_NUMERICALRESPONSE: NO HURT

## 2025-06-19 ASSESSMENT — PAIN DESCRIPTION - PAIN TYPE: TYPE: ACUTE PAIN

## 2025-06-19 ASSESSMENT — PAIN SCALES - GENERAL
PAINLEVEL_OUTOF10: 6
PAINLEVEL_OUTOF10: 8
PAINLEVEL_OUTOF10: 0
PAINLEVEL_OUTOF10: 0
PAINLEVEL_OUTOF10: 8
PAINLEVEL_OUTOF10: 8

## 2025-06-19 ASSESSMENT — PAIN - FUNCTIONAL ASSESSMENT: PAIN_FUNCTIONAL_ASSESSMENT: PREVENTS OR INTERFERES SOME ACTIVE ACTIVITIES AND ADLS

## 2025-06-19 ASSESSMENT — PAIN DESCRIPTION - LOCATION: LOCATION: CHEST

## 2025-06-19 ASSESSMENT — PAIN DESCRIPTION - ONSET: ONSET: ON-GOING

## 2025-06-19 ASSESSMENT — PAIN DESCRIPTION - DESCRIPTORS: DESCRIPTORS: PRESSURE

## 2025-06-19 NOTE — PROGRESS NOTES
Yesterday started on hydroxyzine for anxiety and patient's not sure if helping yet but at the present time does not wish to try buspirone.    Hemodialysis per nephrology.  Patient has iron deficiency anemia with low iron saturation.    Patient with multivessel disease on recent heart cath at [facility] and wishes to go to the [facility] for CABG.     Report given to Dr. Cagle, Cardiology at the [facility] and patient accepted in transfer.  -------    6/18/2025: Patient with new ESRD on HD, multivessel CAD, LVEF 25%, LifeVest, DM, HTN, right BKA and recent TIA.  Patient on heparin and nitroglycerin drips for ischemic cardiomyopathy with elevated troponin level.    Patient seen while undergoing hemodialysis today and says his breathing is improved.  He denies chest pain or cough.  On 3 L O2 nasal cannula.    Patient informed awaiting bed at [facility].    Sodium 133.      Hemoglobin 7.7 with macrocytic indices.  Has iron deficiency anemia.  Normal B12 and folate 6/17/2025.  Normal TSH on 11/15/2024.  --------------    6/19/2025: Patient with new ESRD on HD, multivessel CAD, LVEF 25%, LifeVest, DM, HTN, right BKA and recent TIA.  Patient on heparin and nitroglycerin drips for ischemic cardiomyopathy with elevated troponin level.    Patient has intermittent chest pains.  Some shortness of breath but appears comfortable.  No cough.  O2 sat is 96% on 4 L O2 nasal cannula.    ESRD on HD MWF per nephrology.  Hemoglobin today 7.1.  Iron saturation low.    Uses [facility] in [facility]    Disposition:   [x]Transfer to [facility].      Assessment:    Principal Problem:    Chest pain  Active Problems:    Below-knee amputation of right lower extremity (HCC)    Gastroesophageal reflux disease    Asthma    Hyperlipidemia    Essential hypertension    ESRD (end stage renal disease) (HCC)    Cardiomyopathy (HCC)  Resolved Problems:    * No resolved hospital problems. *  Chronic  amputation of right lower extremity (HCC)    Cellulitis    Gastroesophageal reflux disease    Hypokalemia    Hypomagnesemia    Neuropathy    Other specified chronic obstructive airways disease    Protein-calorie malnutrition, moderate    Sciatica    Ulcer of foot (HCC)    Otitis externa    Abnormal EKG    Ex-smoker for more than 1 year    Family history of premature CAD    GARDINER (dyspnea on exertion)    CKD (chronic kidney disease), stage IV (HCC)    History of diabetes mellitus    Metabolic acidosis    Increased pressure in the eye, right    Glaucoma of right eye    Ulcer of foot due to type 2 diabetes mellitus (HCC)    Vitreous hemorrhage (HCC)    Chest pain    ESRD (end stage renal disease) (HCC)    Cardiomyopathy (HCC)       PAST MEDICAL HISTORY    has a past medical history of Abscess of leg, Allergic rhinitis, Asthma, Blood circulation, collateral, DM2 (diabetes mellitus, type 2) (HCC), GERD (gastroesophageal reflux disease), Headache(784.0), Hyperlipidemia, Hypertension, Neuropathy, Osteoarthritis, Other disorders of kidney and ureter, Pneumonia, and Type II or unspecified type diabetes mellitus without mention of complication, not stated as uncontrolled.    SURGICAL HISTORY      has a past surgical history that includes Foot surgery (Right, 2008); Hand surgery (Left, 2010); Appendectomy (5/31/12); vascular surgery; Cardiac catheterization (2007); Arm Surgery; and Colonoscopy (Left, 2/26/2018).    CURRENT MEDICATIONS     ferrous sulfate, 325 mg, Every Other Day  sodium chloride flush, 5-40 mL, 2 times per day  insulin lispro, 0-8 Units, 4x Daily AC & HS  aspirin, 81 mg, Daily  [Held by provider] amLODIPine, 10 mg, Daily  budesonide-formoterol, 2 puff, BID RT  [Held by provider] hydrALAZINE, 25 mg, TID  metoprolol tartrate, 25 mg, BID  pantoprazole, 40 mg, QAM AC  gabapentin, 100 mg, Once per day on Monday Wednesday Friday  rosuvastatin, 10 mg, Nightly        Continuous Infusions:    nitroGLYCERIN 80 mcg/min  \"NTBNP\"      Urine Sodium:   No components found for: \"BRIAN\"  Urine Potassium:  No results found for: \"KUR\"  Urine Chloride:  No results found for: \"CLUR\"  Urine Osmolarity:   Lab Results   Component Value Date/Time    OSMOU 343 01/16/2023 04:40 PM     Urine Protein:   No results found for: \"TPU\"  Urine Creatinine:   No results found for: \"LABCREA\"  Urine Eosinophils:  No components found for: \"UEOS\"    Thyroid Studies:   No results found for: \"T4\"  No results found for: \"T3\"  Lab Results   Component Value Date/Time    TSH 3.17 06/18/2025 07:40 AM    TSH 0.971 11/15/2024 02:24 PM       Lab Results   Component Value Date    TSH 3.17 06/18/2025       HbA1c  No components found for: \"HA1CC\"    Lipids:  Lab Results   Component Value Date    HDL 26 (L) 11/15/2024    LDL SEE BELOW 07/10/2017    VLDL 71 (H) 11/15/2024         CRP:   Lab Results   Component Value Date    CRP 10.30 (H) 06/15/2025     ESR:   Lab Results   Component Value Date    SEDRATE 59 (H) 06/15/2025       Folate and B12:   Lab Results   Component Value Date    ZKISPPGQ74 1229 06/17/2025   ,   Lab Results   Component Value Date    FOLATE 8.3 06/17/2025       Blood Culture: No results for input(s): \"BC\", \"BLOODCULT2\", \"ORG\" in the last 72 hours.    GRAM STAIN  No results for input(s): \"LABGRAM\", \"LABANAE\", \"ORG\", \"WNDABS\" in the last 72 hours.    Resp Culture Brief : No results found for: \"CULTRESP\"    Body Fluid : No results found for: \"BFCX\"    MRSA : No results found for: \"MRSAC\"    Urine Culture Brief :   Lab Results   Component Value Date/Time    LABURIN No growth-preliminary No growth 01/26/2020 02:19 PM    LABURIN 200 mg/dL 07/08/2014 12:00 AM        Organism Brief :   Lab Results   Component Value Date/Time    ORG Staphylococcus aureus 04/26/2017 04:50 AM       Echo (TTE) complete (PRN contrast/bubble/strain/3D)  Result Date: 6/16/2025    Left Ventricle: Severely reduced left ventricular systolic function with a visually estimated EF of 20 - 25%.  thickening. There appear to be small bilateral  pleural effusions.    There is mild cardiac enlargement.   Normal mediastinum and velvet.  There  is prominence of the pulmonary hilar arteries with peripheral pulmonary  vascular congestion.  There is atherosclerotic calcification of the aortic  arch with tortuosity.    There are diffuse degenerative changes of the visualized thoracic spine.  Normal visualized ribs, clavicles, and shoulders.    There is no demonstrated abnormality of the visualized soft tissue  structures of the upper abdomen.  ___________________________________    IMPRESSION:  Heterogeneous bilateral basilar airspace disease suggests multifocal  pneumonia.    Electronically Signed:  Priya Collado MD  2025/05/28 at 0:53 EDT  Reading Location ID and State: [id number] / [place]  Tel [phone], Service support  [phone], Fax [phone]          cc: Jaime Butler DO; Shu Marsh CNP      Dictated by:  Priya Collado MD on 05/28/25 0053  Transcribed by:  Priya Collado on 05/28/25 0053    Report Signed by:  Priya Collado MD on 05/28/25 0053      This note was dictated using M*Modal Fluency Direct so please excuse any grammatical or syntax errors as no guarantees can be provided that every mistake has been identified and corrected by editing.      Electronically signed by Lexa Bermudez MD on 6/19/2025 at 9:27 AM

## 2025-06-19 NOTE — PLAN OF CARE
Problem: Respiratory - Adult  Goal: Clear lung sounds  6/19/2025 0743 by Keira Grant, RCP  Outcome: Progressing   Breath sounds clear, continuing symbicort bid.   Patient mutually agreed on goals.

## 2025-06-19 NOTE — PLAN OF CARE
Problem: Chronic Conditions and Co-morbidities  Goal: Patient's chronic conditions and co-morbidity symptoms are monitored and maintained or improved  6/18/2025 2349 by Shruthi West, RN  Outcome: Progressing  Flowsheets (Taken 6/18/2025 2349)  Care Plan - Patient's Chronic Conditions and Co-Morbidity Symptoms are Monitored and Maintained or Improved:   Monitor and assess patient's chronic conditions and comorbid symptoms for stability, deterioration, or improvement   Collaborate with multidisciplinary team to address chronic and comorbid conditions and prevent exacerbation or deterioration   Update acute care plan with appropriate goals if chronic or comorbid symptoms are exacerbated and prevent overall improvement and discharge     Problem: Discharge Planning  Goal: Discharge to home or other facility with appropriate resources  6/18/2025 2349 by Shruthi West, RN  Outcome: Progressing  Flowsheets (Taken 6/18/2025 2349)  Discharge to home or other facility with appropriate resources:   Identify barriers to discharge with patient and caregiver   Refer to discharge planning if patient needs post-hospital services based on physician order or complex needs related to functional status, cognitive ability or social support system   Identify discharge learning needs (meds, wound care, etc)   Arrange for needed discharge resources and transportation as appropriate     Problem: Skin/Tissue Integrity  Goal: Skin integrity remains intact  Description: 1.  Monitor for areas of redness and/or skin breakdown  2.  Assess vascular access sites hourly  3.  Every 4-6 hours minimum:  Change oxygen saturation probe site  4.  Every 4-6 hours:  If on nasal continuous positive airway pressure, respiratory therapy assess nares and determine need for appliance change or resting period  6/18/2025 2349 by Shruthi West, RN  Outcome: Progressing  Flowsheets (Taken 6/18/2025 2349)  Skin Integrity Remains Intact: Monitor  Licensed Independent Practitioner if interventions unsuccessful or patient reports new pain     Problem: Cardiovascular - Adult  Goal: Maintains optimal cardiac output and hemodynamic stability  6/18/2025 2349 by Shruthi West RN  Outcome: Progressing  Flowsheets (Taken 6/18/2025 2349)  Maintains optimal cardiac output and hemodynamic stability:   Monitor blood pressure and heart rate   Assess for signs of decreased cardiac output   Monitor urine output and notify Licensed Independent Practitioner for values outside of normal range   Administer vasoactive medications as ordered     Problem: Cardiovascular - Adult  Goal: Absence of cardiac dysrhythmias or at baseline  6/18/2025 2349 by Shruthi West, RN  Outcome: Progressing  Flowsheets (Taken 6/18/2025 2349)  Absence of cardiac dysrhythmias or at baseline:   Monitor cardiac rate and rhythm   Assess for signs of decreased cardiac output   Administer antiarrhythmia medication and electrolyte replacement as ordered   Care plan reviewed with patient.  Patient verbalizes understanding of the plan of care and contributed to goal setting.

## 2025-06-19 NOTE — PROGRESS NOTES
[facility]  INPATIENT OCCUPATIONAL THERAPY  STR NEUROSCIENCES 4A  EVALUATION      Discharge Recommendations: Other (Comment), Patient would benefit from continued therapy after discharge (Pt pending transfer to [facility] at time of eval.)  Equipment Recommendations: No Defer to next level of care.      Time In: 1333  Time Out: 1355  Timed Code Treatment Minutes: 14 Minutes  Minutes: 22          Date: 2025  Patient Name: Yaakov Kemp,   Gender: male      MRN: [id number]  : 1965  (59 y.o.)  Referring Practitioner: Lexa Bermudez MD  Diagnosis: Chest pain  Additional Pertinent Hx: Per MD H & P:59 y.o. male who presents to the emergency department from home, by private vehicle for evaluation of chest pain     Patient presents to the ED with complaints of exertional chest pain described as pressure all over his chest with very minimal exertion after taking few steps associated with shortness of breath.  Denies any orthopnea fever chills  Does report fatigue     Patient states he recently left AMA from [facility] after being admitted for about 2 weeks for bilateral pneumonia requiring intubation, myocardial infarction, CHF, acute renal failure with new onset dialysis, and stroke.  He was found to have a low EF and was placed on a LifeVest.  Was feeling okay initially however over the last 2 days has developed exertional shortness of breath and chest pain.  He was found to have multiple vessel CAD, was not a candidate for CABG at the time.    Restrictions/Precautions:  Restrictions/Precautions: Fall Risk  Position Activity Restriction  Other Position/Activity Restrictions: LifeVest; R BKA- pt's wife to bring prosthetic    Subjective  Chart Reviewed: Yes, Orders, Progress Notes, History and Physical, Imaging, Labs  Patient assessed for rehabilitation services?: Yes    Subjective: Pt with family and RN present for sponge bathing upon OT arrival, agreeable to OT session. RN okayed therapy

## 2025-06-19 NOTE — CARE COORDINATION
6/19/25, 1:58 PM EDT    DISCHARGE ON GOING EVALUATION    [facility] day: 3  Location: 4A-16/016-A Reason for admit: Chest pain [R07.9]  ESRD (end stage renal disease) (HCC) [N18.6]  Chest pain, unspecified type [R07.9]  Cardiomyopathy, unspecified type (HCC) [I42.9]     Procedures:   6/16 ECHO EF 20-25%     Imaging since last note:   6/19 CXR 1. Poor flexion of the lungs. Moderate cardiomegaly. Artifacts are present from   a life vest. Left jugular dialysis catheter, tip at cavoatrial junction.   2. Moderate atelectasis/pneumonia/edema both mid and lower lung fields. Tiny   effusion left side.   3. Overall appearance slightly worse than prior.     Barriers to Discharge: Nephrology following for HD, telemetry, PT/OT, diabetes management. Heparin drip, Zofran prn, Nitro drip.     PCP: No primary care provider on file.  Readmission Risk Score: 19.7    Patient Goals/Plan/Treatment Preferences: From home with spouse. Plan is to transfer to [facility].

## 2025-06-19 NOTE — PROGRESS NOTES
Pt was in bed as his wife was with him. A grandchild was there too. He was dealing with chest pain. He was encouraged and blessed.    06/19/25 1329   Encounter Summary   Encounter Overview/Reason Initial Encounter   Service Provided For Patient and family together   Referral/Consult From [facility]   Support System Spouse;Children   Last Encounter  06/19/25   Complexity of Encounter Low   Begin Time 0930   End Time  0936   Total Time Calculated 6 min   Spiritual/Emotional needs   Type Spiritual Support   Assessment/Intervention/Outcome   Assessment Hopeful   Intervention Empowerment   Outcome Encouraged;Engaged in conversation

## 2025-06-19 NOTE — PROGRESS NOTES
Renal Progress Note  6/19/2025 7:11 PM  Pt Name: Yaakov Kemp  MRN: [id number]  [id number]  YOB: 1965  Admit Date: 6/15/2025 10:54 PM  Date of evaluation: 6/19/2025  Primary Care Physician: No primary care provider on file.   4A-16/016-A       Subjective:     S/p IHD yesterday   No chest pain     Diet: ADULT DIET; Regular; Low Fat/Low Chol/High Fiber/2 gm Na; 2000 ml    Medications:   Scheduled Meds:   ferrous sulfate  325 mg Oral Every Other Day    [START ON 6/20/2025] epoetin  3,000 Units SubCUTAneous Once per day on Monday Wednesday Friday    sodium chloride flush  5-40 mL IntraVENous 2 times per day    insulin lispro  0-8 Units SubCUTAneous 4x Daily AC & HS    aspirin  81 mg Oral Daily    [Held by provider] amLODIPine  10 mg Oral Daily    budesonide-formoterol  2 puff Inhalation BID RT    [Held by provider] hydrALAZINE  25 mg Oral TID    metoprolol tartrate  25 mg Oral BID    pantoprazole  40 mg Oral QAM AC    gabapentin  100 mg Oral Once per day on Monday Wednesday Friday    rosuvastatin  10 mg Oral Nightly     Continuous Infusions:   nitroGLYCERIN 30 mcg/min (06/19/25 1834)    sodium chloride      dextrose      heparin (PORCINE) Infusion 21 Units/kg/hr (06/19/25 1725)       Objective:   Vitals:   /72   Pulse 69   Temp 98.6 °F (37 °C) (Oral)   Resp 18   Ht 1.778 m (5' 10\")   Wt 86.2 kg (190 lb 0.6 oz)   SpO2 99%   BMI 27.27 kg/m²     I&O's:    Intake/Output Summary (Last 24 hours) at 6/19/2025 1911  Last data filed at 6/19/2025 1415  Gross per 24 hour   Intake 820 ml   Output 350 ml   Net 470 ml     I/O last 3 completed shifts:  In: 2101 [P.O.:820; I.V.:881]  Out: 3875 [Urine:475]   Date 06/19/25 0000 - 06/19/25 2359   Shift 1143-9823 2861-6592 3876-4379 24 Hour Total   INTAKE   P.O.(mL/kg/hr) 100(0.1) 720(1)  820   Shift Total(mL/kg) 100(1.2) 720(8.4)  820(9.5)   OUTPUT   Urine(mL/kg/hr) 0(0) 350(0.5)  350   Emesis/NG output(mL/kg) 0(0)   0(0)   Other(mL/kg) 0(0)   0(0)  labs  Declined another session of IHD today   Venofer 200 mg IV once   IHD on MWF     Keep K>4 and  Mag>2  Renal diet    Will follow         Oscar Londono MD, MD, FASN  Board Certified Nephrologist

## 2025-06-20 LAB
ANION GAP SERPL CALC-SCNC: 17 MEQ/L (ref 8–16)
BUN SERPL-MCNC: 34 MG/DL (ref 8–23)
CA-I BLD ISE-SCNC: 0.99 MMOL/L (ref 1.12–1.32)
CALCIUM SERPL-MCNC: 7.7 MG/DL (ref 8.6–10)
CHLORIDE SERPL-SCNC: 91 MEQ/L (ref 98–111)
CO2 SERPL-SCNC: 21 MEQ/L (ref 22–29)
CREAT SERPL-MCNC: 5.5 MG/DL (ref 0.7–1.2)
DEPRECATED RDW RBC AUTO: 58.8 FL (ref 35–45)
ERYTHROCYTE [DISTWIDTH] IN BLOOD BY AUTOMATED COUNT: 16.7 % (ref 11.5–14.5)
GFR SERPL CREATININE-BSD FRML MDRD: 11 ML/MIN/1.73M2
GLUCOSE BLD STRIP.AUTO-MCNC: 132 MG/DL (ref 70–108)
GLUCOSE BLD STRIP.AUTO-MCNC: 171 MG/DL (ref 70–108)
GLUCOSE BLD STRIP.AUTO-MCNC: 215 MG/DL (ref 70–108)
GLUCOSE SERPL-MCNC: 184 MG/DL (ref 74–109)
HCT VFR BLD AUTO: 25.1 % (ref 42–52)
HEPARIN UNFRACTIONATED: 0.51 U/ML (ref 0.3–0.7)
HGB BLD-MCNC: 7.6 GM/DL (ref 14–18)
MCH RBC QN AUTO: 29.8 PG (ref 26–33)
MCHC RBC AUTO-ENTMCNC: 30.3 GM/DL (ref 32.2–35.5)
MCV RBC AUTO: 98.4 FL (ref 80–94)
PLATELET # BLD AUTO: 135 THOU/MM3 (ref 130–400)
PMV BLD AUTO: 11.7 FL (ref 9.4–12.4)
POTASSIUM SERPL-SCNC: 4.9 MEQ/L (ref 3.5–5.2)
RBC # BLD AUTO: 2.55 MILL/MM3 (ref 4.7–6.1)
SCAN OF BLOOD SMEAR: NORMAL
SODIUM SERPL-SCNC: 129 MEQ/L (ref 135–145)
WBC # BLD AUTO: 9.8 THOU/MM3 (ref 4.8–10.8)

## 2025-06-20 PROCEDURE — 2580000003 HC RX 258: Performed by: INTERNAL MEDICINE

## 2025-06-20 PROCEDURE — 6370000000 HC RX 637 (ALT 250 FOR IP): Performed by: INTERNAL MEDICINE

## 2025-06-20 PROCEDURE — 94761 N-INVAS EAR/PLS OXIMETRY MLT: CPT

## 2025-06-20 PROCEDURE — 90935 HEMODIALYSIS ONE EVALUATION: CPT

## 2025-06-20 PROCEDURE — 82330 ASSAY OF CALCIUM: CPT

## 2025-06-20 PROCEDURE — 6360000002 HC RX W HCPCS: Performed by: INTERNAL MEDICINE

## 2025-06-20 PROCEDURE — 36415 COLL VENOUS BLD VENIPUNCTURE: CPT

## 2025-06-20 PROCEDURE — 85027 COMPLETE CBC AUTOMATED: CPT

## 2025-06-20 PROCEDURE — 1200000003 HC TELEMETRY R&B

## 2025-06-20 PROCEDURE — 85520 HEPARIN ASSAY: CPT

## 2025-06-20 PROCEDURE — 6370000000 HC RX 637 (ALT 250 FOR IP)

## 2025-06-20 PROCEDURE — 2500000003 HC RX 250 WO HCPCS

## 2025-06-20 PROCEDURE — 99232 SBSQ HOSP IP/OBS MODERATE 35: CPT | Performed by: INTERNAL MEDICINE

## 2025-06-20 PROCEDURE — 82948 REAGENT STRIP/BLOOD GLUCOSE: CPT

## 2025-06-20 PROCEDURE — 80048 BASIC METABOLIC PNL TOTAL CA: CPT

## 2025-06-20 PROCEDURE — 6360000002 HC RX W HCPCS

## 2025-06-20 RX ORDER — GABAPENTIN 100 MG/1
100 CAPSULE ORAL
Status: DISCONTINUED | OUTPATIENT
Start: 2025-06-20 | End: 2025-06-21 | Stop reason: HOSPADM

## 2025-06-20 RX ADMIN — HEPARIN SODIUM 21 UNITS/KG/HR: 10000 INJECTION, SOLUTION INTRAVENOUS at 22:02

## 2025-06-20 RX ADMIN — ASPIRIN 81 MG: 81 TABLET, CHEWABLE ORAL at 12:12

## 2025-06-20 RX ADMIN — BUDESONIDE AND FORMOTEROL FUMARATE DIHYDRATE 2 PUFF: 160; 4.5 AEROSOL RESPIRATORY (INHALATION) at 17:14

## 2025-06-20 RX ADMIN — EPOETIN ALFA-EPBX 3000 UNITS: 3000 INJECTION, SOLUTION INTRAVENOUS; SUBCUTANEOUS at 12:14

## 2025-06-20 RX ADMIN — GABAPENTIN 100 MG: 100 CAPSULE ORAL at 15:13

## 2025-06-20 RX ADMIN — HEPARIN SODIUM 21 UNITS/KG/HR: 10000 INJECTION, SOLUTION INTRAVENOUS at 08:14

## 2025-06-20 RX ADMIN — SODIUM CHLORIDE 125 MG: 9 INJECTION, SOLUTION INTRAVENOUS at 12:20

## 2025-06-20 RX ADMIN — INSULIN LISPRO 2 UNITS: 100 INJECTION, SOLUTION INTRAVENOUS; SUBCUTANEOUS at 16:51

## 2025-06-20 RX ADMIN — ROSUVASTATIN CALCIUM 10 MG: 10 TABLET, FILM COATED ORAL at 20:32

## 2025-06-20 RX ADMIN — METOPROLOL TARTRATE 25 MG: 50 TABLET, FILM COATED ORAL at 20:31

## 2025-06-20 RX ADMIN — SODIUM CHLORIDE, PRESERVATIVE FREE 10 ML: 5 INJECTION INTRAVENOUS at 21:30

## 2025-06-20 ASSESSMENT — PAIN SCALES - WONG BAKER: WONGBAKER_NUMERICALRESPONSE: NO HURT

## 2025-06-20 ASSESSMENT — PAIN SCALES - GENERAL: PAINLEVEL_OUTOF10: 0

## 2025-06-20 NOTE — PROGRESS NOTES
Renal Progress Note  6/20/2025 10:52 AM  Pt Name: Yaakov Kemp  MRN: [id number]  [id number]  YOB: 1965  Admit Date: 6/15/2025 10:54 PM  Date of evaluation: 6/20/2025  Primary Care Physician: No primary care provider on file.   4A-16/016-A       Subjective:     Pt  was seen and examined  on IHD   He is tolerating the procedure No chest pain     Diet: ADULT DIET; Regular; Low Fat/Low Chol/High Fiber/2 gm Na; 2000 ml    Medications:   Scheduled Meds:   ferrous sulfate  325 mg Oral Every Other Day    epoetin fer-epbx  3,000 Units SubCUTAneous Once per day on Monday Wednesday Friday    sodium chloride flush  5-40 mL IntraVENous 2 times per day    insulin lispro  0-8 Units SubCUTAneous 4x Daily AC & HS    aspirin  81 mg Oral Daily    [Held by provider] amLODIPine  10 mg Oral Daily    budesonide-formoterol  2 puff Inhalation BID RT    [Held by provider] hydrALAZINE  25 mg Oral TID    metoprolol tartrate  25 mg Oral BID    pantoprazole  40 mg Oral QAM AC    gabapentin  100 mg Oral Once per day on Monday Wednesday Friday    rosuvastatin  10 mg Oral Nightly     Continuous Infusions:   nitroGLYCERIN Stopped (06/19/25 2310)    sodium chloride      dextrose      heparin (PORCINE) Infusion 21 Units/kg/hr (06/20/25 0814)       Objective:   Vitals:   /66   Pulse 65   Temp 97.7 °F (36.5 °C)   Resp 22   Ht 1.778 m (5' 10\")   Wt 85.4 kg (188 lb 4.4 oz)   SpO2 100%   BMI 27.01 kg/m²     I&O's:    Intake/Output Summary (Last 24 hours) at 6/20/2025 1052  Last data filed at 6/20/2025 0435  Gross per 24 hour   Intake 540 ml   Output 350 ml   Net 190 ml     I/O last 3 completed shifts:  In: 1167.6 [P.O.:1120; I.V.:47.6]  Out: 350 [Urine:350]   Date 06/20/25 0000 - 06/20/25 2359   Shift 4174-7432 0416-1053 4786-6523 24 Hour Total   INTAKE   P.O.(mL/kg/hr) 300(0.4)   300   Shift Total(mL/kg) 300(3.5)   300(3.5)   OUTPUT   Urine(mL/kg/hr) 0(0)   0   Emesis/NG output(mL/kg) 0(0)   0(0)   Other(mL/kg) 0(0)    examined on IHD  , he is tolerating the procedure   Another dose of Venofer 300 mg IV  today   IHD on MWF     Keep K>4 and  Mag>2  Renal diet    Will follow         Oscar Londono MD, MD, VIRI  Board Certified Nephrologist

## 2025-06-20 NOTE — PROGRESS NOTES
[facility]--HOSPITALIST GROUP    Hospitalist PROGRESS NOTE dictated by Lexa Bermudez MD on 2025    Patient ID: Yaakov Kemp is 59 y.o. and presently in room -A  : 1965 MRN: [id number]    Admit date: 6/15/2025  Primary Care Physician: No primary care provider on file.   Patient Care Team:  Shu Marsh APRN - CNP as PCP - Empaneled Provider  Tel: None  Admitting Physician: No admitting provider for patient encounter.   Code Status:  Full Code    Yaakov Kemp is a 59 y.o.  male who presented with Fatigue and Shortness of Breath    Room: -  Admit date: 6/15/2025    Per report: Yaakov Kemp is a 59 y.o. male with PMHx of CAD, HFrEF, IDDM 2, HTN, TIA who presents to [facility] from home with chest pain. Patient was admitted at [facility] for 2 weeks. Initially went in for pneumonia. Patient had MI and underwent LHC which found multivessel disease. Patient was deemed not a candidate for CABG at the time due to EF of 25%. Patient was given LifeVest. Patient also requiring new onset dialysis. Patient also had TIA. Patient decided to leave A because he did not want to be on the rehab floor. Patient was not sent home on GDMT or treatment for CVA. I consulted cards and nephro   --------    2025: Patient new ESRD on HD, CAD, HF R EF 25%, seen this morning at dialysis did not appear dyspneic although he stated he still has some shortness of breath.  No chest pain or cough.  On 3 L O2 nasal cannula.  --------------    2025: Patient with new ESRD on HD, CAD, LVEF 25%, LifeVest, DM, HTN, right BKA and recent TIA.  Patient on heparin and nitroglycerin drips for ischemic cardiomyopathy with elevated troponin level.    Patient seen this morning and states has intermittent chest pain but presently chest pain-free. Denies cough. Also has some edema shortness of breath requiring 2 to 3 L O2 nasal cannula.  Ventricle: Right ventricle size is normal. Moderately reduced systolic function.   Aortic Valve: Trileaflet valve. Mildly thickened cusps. Mildly calcified cusps. No regurgitation. Mild stenosis of the aortic valve. AV mean gradient is 7 mmHg. AV area by continuity VTI is 1.3 cm2. AV Area by VTI is 1.3 cm2. AV Area by Peak Velocity is 1.3 cm2.   Mitral Valve: Mild regurgitation.   Left Atrium: Left atrium is moderately dilated.   Right Atrium: Right atrium is moderately dilated.   Aorta: Ascending Aorta is 3.3 cm.   IVC/SVC: IVC diameter is dilated and decreases less than 50% during inspiration; therefore the estimated right atrial pressure is elevated (~15 mmHg).   Image quality is adequate. Contrast used: Lumason.     CT HEAD WO CONTRAST  Result Date: 6/16/2025  CT head without contrast Comparison: None Findings: No intra-axial mass, midline shift, hydrocephalus, or acute hemorrhage. No significant atrophy-like change or white matter disease. There is no sinus or mastoid fluid. The orbits are within normal limits. There is no acute fracture.     1. No acute intracranial findings. This document has been electronically signed by: José Miguel Mclaughlin MD on 06/16/2025 03:40 AM All CTs at this facility use dose modulation techniques and iterative reconstructions, and/or weight-based dosing when appropriate to reduce radiation to a low as reasonably achievable.    XR CHEST PORTABLE  Result Date: 6/16/2025  1 view chest x-ray Comparison: CR/SR - XR CHEST STANDARD (2 VW) - 9/8/21 02:39 EDT Findings: Low lung volumes. Reticular opacities at both lung bases. No effusion or pneumothorax. Normal size heart. Left internal jugular tunnel dialysis catheter in the SVC. No acute fracture.     Ill-defined bibasilar lung opacities are indeterminate. Although atelectasis is favored, this could represent a small amount of edema or bibasilar consolidation. This document has been electronically signed by: José Miguel Mclaughlin

## 2025-06-20 NOTE — PLAN OF CARE
Problem: Chronic Conditions and Co-morbidities  Goal: Patient's chronic conditions and co-morbidity symptoms are monitored and maintained or improved  6/19/2025 2310 by Shruthi West, RN  Outcome: Progressing  Flowsheets (Taken 6/19/2025 2310)  Care Plan - Patient's Chronic Conditions and Co-Morbidity Symptoms are Monitored and Maintained or Improved:   Monitor and assess patient's chronic conditions and comorbid symptoms for stability, deterioration, or improvement   Collaborate with multidisciplinary team to address chronic and comorbid conditions and prevent exacerbation or deterioration   Update acute care plan with appropriate goals if chronic or comorbid symptoms are exacerbated and prevent overall improvement and discharge     Problem: Discharge Planning  Goal: Discharge to home or other facility with appropriate resources  6/19/2025 2310 by Shruthi West, RN  Outcome: Progressing  Flowsheets (Taken 6/19/2025 2310)  Discharge to home or other facility with appropriate resources:   Identify barriers to discharge with patient and caregiver   Refer to discharge planning if patient needs post-hospital services based on physician order or complex needs related to functional status, cognitive ability or social support system   Identify discharge learning needs (meds, wound care, etc)   Arrange for needed discharge resources and transportation as appropriate     Problem: Skin/Tissue Integrity  Goal: Skin integrity remains intact  Description: 1.  Monitor for areas of redness and/or skin breakdown  2.  Assess vascular access sites hourly  3.  Every 4-6 hours minimum:  Change oxygen saturation probe site  4.  Every 4-6 hours:  If on nasal continuous positive airway pressure, respiratory therapy assess nares and determine need for appliance change or resting period  6/19/2025 2310 by Shruthi West, RN  Outcome: Progressing  Flowsheets (Taken 6/19/2025 2310)  Skin Integrity Remains Intact: Monitor

## 2025-06-20 NOTE — FLOWSHEET NOTE
06/20/25 0744 06/20/25 1140   Vital Signs   /66 (!) 100/59   Temp 97.7 °F (36.5 °C) 97.9 °F (36.6 °C)   Pulse 65 68   Respirations 22 18   SpO2 100 % 99 %   Weight - Scale 85.4 kg (188 lb 4.4 oz) 83.4 kg (183 lb 13.8 oz)   Weight Method Bed scale Bed scale   Percent Weight Change 0 -2.34   Post-Hemodialysis Assessment   Post-Treatment Procedures  --  Blood returned;Catheter Capped, clamped with Saline x2 ports   Machine Disinfection Process  --  Acid/Vinegar Clean;Heat Disinfect;Exterior Machine Disinfection   Blood Volume Processed (Liters)  --  61 L   Dialyzer Clearance  --  Lightly streaked   Duration of Treatment (minutes)  --  210 minutes   Hemodialysis Intake (ml)  --  400 ml   Hemodialysis Output (ml)  --  1900 ml   NET Removed (ml)  --  1500   Tolerated Treatment  --  Good     Stable 3.5 hour treatment complete. Removed 1.5 liters of fluid. Tolerated well. HD catheter ports flushed, clamped and capped. Dressing clean, dry and intact. Report given to primary RN. Treatment record printed to be scanned into EMR.

## 2025-06-20 NOTE — PLAN OF CARE
Problem: Respiratory - Adult  Goal: Clear lung sounds  6/19/2025 2138 by Chantel Crawford RCP  Outcome: Progressing     Patient mutually agreed on goals.

## 2025-06-20 NOTE — PLAN OF CARE
Problem: Chronic Conditions and Co-morbidities  Goal: Patient's chronic conditions and co-morbidity symptoms are monitored and maintained or improved  Outcome: Progressing  Flowsheets (Taken 6/20/2025 0956)  Care Plan - Patient's Chronic Conditions and Co-Morbidity Symptoms are Monitored and Maintained or Improved:   Monitor and assess patient's chronic conditions and comorbid symptoms for stability, deterioration, or improvement   Update acute care plan with appropriate goals if chronic or comorbid symptoms are exacerbated and prevent overall improvement and discharge   Collaborate with multidisciplinary team to address chronic and comorbid conditions and prevent exacerbation or deterioration     Problem: Discharge Planning  Goal: Discharge to home or other facility with appropriate resources  Outcome: Progressing  Flowsheets (Taken 6/20/2025 0956)  Discharge to home or other facility with appropriate resources:   Identify barriers to discharge with patient and caregiver   Arrange for needed discharge resources and transportation as appropriate     Problem: Skin/Tissue Integrity  Goal: Skin integrity remains intact  Description: 1.  Monitor for areas of redness and/or skin breakdown  2.  Assess vascular access sites hourly  3.  Every 4-6 hours minimum:  Change oxygen saturation probe site  4.  Every 4-6 hours:  If on nasal continuous positive airway pressure, respiratory therapy assess nares and determine need for appliance change or resting period  Outcome: Progressing  Flowsheets  Taken 6/20/2025 0956 by Karen Gillis, RN  Skin Integrity Remains Intact: Monitor for areas of redness and/or skin breakdown  Taken 6/19/2025 2317 by Shruthi West, RN  Skin Integrity Remains Intact: Monitor for areas of redness and/or skin breakdown     Problem: Safety - Adult  Goal: Free from fall injury  Outcome: Progressing  Flowsheets (Taken 6/20/2025 0956)  Free From Fall Injury: Instruct family/caregiver on patient

## 2025-06-21 VITALS
BODY MASS INDEX: 27.43 KG/M2 | RESPIRATION RATE: 17 BRPM | SYSTOLIC BLOOD PRESSURE: 112 MMHG | OXYGEN SATURATION: 100 % | TEMPERATURE: 98 F | DIASTOLIC BLOOD PRESSURE: 65 MMHG | HEIGHT: 70 IN | WEIGHT: 191.58 LBS | HEART RATE: 66 BPM

## 2025-06-21 LAB
ANION GAP SERPL CALC-SCNC: 15 MEQ/L (ref 8–16)
BUN SERPL-MCNC: 33 MG/DL (ref 8–23)
CALCIUM SERPL-MCNC: 8.5 MG/DL (ref 8.6–10)
CHLORIDE SERPL-SCNC: 93 MEQ/L (ref 98–111)
CO2 SERPL-SCNC: 23 MEQ/L (ref 22–29)
CREAT SERPL-MCNC: 4.9 MG/DL (ref 0.7–1.2)
GFR SERPL CREATININE-BSD FRML MDRD: 13 ML/MIN/1.73M2
GLUCOSE BLD STRIP.AUTO-MCNC: 128 MG/DL (ref 70–108)
GLUCOSE BLD STRIP.AUTO-MCNC: 167 MG/DL (ref 70–108)
GLUCOSE SERPL-MCNC: 242 MG/DL (ref 74–109)
HEPARIN UNFRACTIONATED: 0.52 U/ML (ref 0.3–0.7)
POTASSIUM SERPL-SCNC: 4.7 MEQ/L (ref 3.5–5.2)
SODIUM SERPL-SCNC: 131 MEQ/L (ref 135–145)

## 2025-06-21 PROCEDURE — 6360000002 HC RX W HCPCS

## 2025-06-21 PROCEDURE — 6370000000 HC RX 637 (ALT 250 FOR IP): Performed by: INTERNAL MEDICINE

## 2025-06-21 PROCEDURE — 6370000000 HC RX 637 (ALT 250 FOR IP)

## 2025-06-21 PROCEDURE — 94640 AIRWAY INHALATION TREATMENT: CPT

## 2025-06-21 PROCEDURE — 99239 HOSP IP/OBS DSCHRG MGMT >30: CPT | Performed by: INTERNAL MEDICINE

## 2025-06-21 PROCEDURE — 82948 REAGENT STRIP/BLOOD GLUCOSE: CPT

## 2025-06-21 PROCEDURE — 36415 COLL VENOUS BLD VENIPUNCTURE: CPT

## 2025-06-21 PROCEDURE — 85520 HEPARIN ASSAY: CPT

## 2025-06-21 PROCEDURE — 2700000000 HC OXYGEN THERAPY PER DAY

## 2025-06-21 PROCEDURE — 80048 BASIC METABOLIC PNL TOTAL CA: CPT

## 2025-06-21 RX ORDER — INSULIN LISPRO 100 [IU]/ML
0-8 INJECTION, SOLUTION INTRAVENOUS; SUBCUTANEOUS
Qty: 1 EACH | Refills: 0
Start: 2025-06-21

## 2025-06-21 RX ORDER — BUDESONIDE AND FORMOTEROL FUMARATE DIHYDRATE 160; 4.5 UG/1; UG/1
2 AEROSOL RESPIRATORY (INHALATION)
Qty: 10.2 G | Refills: 3
Start: 2025-06-21

## 2025-06-21 RX ORDER — HEPARIN SODIUM 1000 [USP'U]/ML
4000 INJECTION, SOLUTION INTRAVENOUS; SUBCUTANEOUS PRN
Qty: 1 EACH | Refills: 0
Start: 2025-06-21

## 2025-06-21 RX ORDER — LORAZEPAM 0.5 MG/1
0.5 TABLET ORAL ONCE
Status: COMPLETED | OUTPATIENT
Start: 2025-06-21 | End: 2025-06-21

## 2025-06-21 RX ORDER — CALCIUM CARBONATE 500(1250)
500 TABLET ORAL 2 TIMES DAILY
Status: DISCONTINUED | OUTPATIENT
Start: 2025-06-21 | End: 2025-06-21 | Stop reason: HOSPADM

## 2025-06-21 RX ORDER — HEPARIN SODIUM 10000 [USP'U]/100ML
5-30 INJECTION, SOLUTION INTRAVENOUS CONTINUOUS
Qty: 1 ML | Refills: 0
Start: 2025-06-21

## 2025-06-21 RX ORDER — CALCIUM CARBONATE 500(1250)
500 TABLET ORAL 2 TIMES DAILY
Qty: 5 TABLET | Refills: 0
Start: 2025-06-21 | End: 2025-06-24

## 2025-06-21 RX ORDER — HYDROXYZINE HYDROCHLORIDE 50 MG/1
50 TABLET, FILM COATED ORAL 3 TIMES DAILY PRN
Qty: 10 TABLET | Refills: 0
Start: 2025-06-21 | End: 2025-07-01

## 2025-06-21 RX ORDER — METOPROLOL TARTRATE 25 MG/1
25 TABLET, FILM COATED ORAL 2 TIMES DAILY
Qty: 60 TABLET | Refills: 0
Start: 2025-06-21

## 2025-06-21 RX ORDER — GABAPENTIN 100 MG/1
100 CAPSULE ORAL
Qty: 30 CAPSULE | Refills: 0
Start: 2025-06-23 | End: 2025-08-30

## 2025-06-21 RX ORDER — ROSUVASTATIN CALCIUM 10 MG/1
10 TABLET, COATED ORAL NIGHTLY
Qty: 30 TABLET | Refills: 0
Start: 2025-06-21

## 2025-06-21 RX ORDER — HEPARIN SODIUM 1000 [USP'U]/ML
2000 INJECTION, SOLUTION INTRAVENOUS; SUBCUTANEOUS PRN
Qty: 1 EACH | Refills: 0
Start: 2025-06-21

## 2025-06-21 RX ORDER — FERROUS SULFATE 325(65) MG
325 TABLET ORAL EVERY OTHER DAY
Qty: 30 TABLET | Refills: 0
Start: 2025-06-21

## 2025-06-21 RX ORDER — ASPIRIN 81 MG/1
81 TABLET, CHEWABLE ORAL DAILY
Qty: 30 TABLET | Refills: 3
Start: 2025-06-22

## 2025-06-21 RX ORDER — LORAZEPAM 2 MG/ML
0.5 INJECTION INTRAMUSCULAR ONCE
Status: DISCONTINUED | OUTPATIENT
Start: 2025-06-21 | End: 2025-06-21

## 2025-06-21 RX ADMIN — CALCIUM 500 MG: 500 TABLET ORAL at 09:41

## 2025-06-21 RX ADMIN — BUDESONIDE AND FORMOTEROL FUMARATE DIHYDRATE 2 PUFF: 160; 4.5 AEROSOL RESPIRATORY (INHALATION) at 07:47

## 2025-06-21 RX ADMIN — HYDROXYZINE HYDROCHLORIDE 50 MG: 25 TABLET, FILM COATED ORAL at 01:55

## 2025-06-21 RX ADMIN — PANTOPRAZOLE SODIUM 40 MG: 40 TABLET, DELAYED RELEASE ORAL at 09:49

## 2025-06-21 RX ADMIN — HEPARIN SODIUM 21 UNITS/KG/HR: 10000 INJECTION, SOLUTION INTRAVENOUS at 12:11

## 2025-06-21 RX ADMIN — ASPIRIN 81 MG: 81 TABLET, CHEWABLE ORAL at 09:41

## 2025-06-21 RX ADMIN — LORAZEPAM 0.5 MG: 0.5 TABLET ORAL at 18:27

## 2025-06-21 RX ADMIN — LORAZEPAM 0.5 MG: 0.5 TABLET ORAL at 04:48

## 2025-06-21 RX ADMIN — FERROUS SULFATE TAB 325 MG (65 MG ELEMENTAL FE) 325 MG: 325 (65 FE) TAB at 11:58

## 2025-06-21 RX ADMIN — HYDROXYZINE HYDROCHLORIDE 50 MG: 25 TABLET, FILM COATED ORAL at 14:46

## 2025-06-21 RX ADMIN — INSULIN LISPRO 2 UNITS: 100 INJECTION, SOLUTION INTRAVENOUS; SUBCUTANEOUS at 09:49

## 2025-06-21 RX ADMIN — METOPROLOL TARTRATE 25 MG: 50 TABLET, FILM COATED ORAL at 09:41

## 2025-06-21 NOTE — PLAN OF CARE
Problem: Respiratory - Adult  Goal: Clear lung sounds  Outcome: Progressing  Note: Maintenance  Patient mutually agreed on goals.

## 2025-06-21 NOTE — DISCHARGE SUMMARY
[facility]--HOSPITALIST GROUP    Hospitalist DISCHARGE SUMMARY dictated by Lexa Bermudez MD on 2025      DEMOGRAPHICS     Date of Service: 2025  10:42 AM   Patient ID:Yaakov Kemp is59 y.o.   : 1965 MRN:[id number]  Code Status:  Full Code  Admit date: 6/15/2025  Discharge date: 2025     Primary Care Physician - No primary care provider on file.   Patient Care Team:  Shu Marsh APRN - CNP as PCP - Empaneled Provider  Tel: None  IP CONSULT TO CARDIOLOGY  IP CONSULT TO NEPHROLOGY  PALLIATIVE CARE EVAL  Admitting Physician: Lexa Bermudez MD   Discharge Physician: Lexa Bermudez MD      MEDICAL SYNOPSIS     FOLLOW UP APPOINTMENTS:   No follow-up provider specified.    Code Status:  Full Code    Diet: ADULT DIET; Regular; Low Fat/Low Chol/High Fiber/2 gm Na; 2000 ml    Final diagnoses:       Principal Problem:    Chest pain  Active Problems:    Below-knee amputation of right lower extremity (HCC)    Gastroesophageal reflux disease    Asthma    Hyperlipidemia    Essential hypertension    ESRD (end stage renal disease) ([facility])    Cardiomyopathy ([facility])  Resolved Problems:    * No resolved hospital problems. *  Chronic hypoxic respiratory failure on 3-4 l home O2.  Elevated troponin: Reportedly ranged 1961-7837 at [facility], found to be above 10,000 at [facility].  Troponin 10K on 2025.  CHF with systolic dysfunction:  Obstructive CAD: identified on LHC at [facility]  Acute on chronic HFrEF   New onset ESRD, HD MWF  Oliguric BASIL now dependent on IHD (secondary to ATN/cardio-renal)   IDDM 2   Recent TIA   Noncompliance patient left AMA 3 days PTA post stroke, MI, cardiogenic shock and pneumonia  LVEF 20 - 25% per echo 2025.  LifeVest   Normal TSH on 2025.  Low iron saturation of 17 and 2025.  Iron deficiency anemia.  Venofer 200 mg IV on 2025      Yaakov Kemp is a 59 y.o.  male who presents with Fatigue and Shortness of Breath      Hospital  for you. Read the directions carefully, and ask your doctor or other care provider to review them with you.                CHANGE how you take these medications      gabapentin 100 MG capsule  Commonly known as: NEURONTIN  Take 1 capsule by mouth three times a week for 30 doses.  Start taking on: June 23, 2025  What changed:   medication strength  how much to take  when to take this  Another medication with the same name was removed. Continue taking this medication, and follow the directions you see here.     glucose monitoring kit  1 kit by Does not apply route daily  What changed:   when to take this  reasons to take this     metoprolol tartrate 25 MG tablet  Commonly known as: LOPRESSOR  Take 1 tablet by mouth 2 times daily  What changed:   medication strength  how much to take            CONTINUE taking these medications      docusate sodium 100 MG capsule  Commonly known as: COLACE     pantoprazole 40 MG tablet  Commonly known as: PROTONIX  Take 1 tablet by mouth every morning (before breakfast)     polyethylene glycol 17 g packet  Commonly known as: GLYCOLAX     vitamin D 1.25 MG (35548 UT) Caps capsule  Commonly known as: ERGOCALCIFEROL            STOP taking these medications      acetaminophen 500 MG tablet  Commonly known as: TYLENOL     albuterol (2.5 MG/3ML) 0.083% nebulizer solution  Commonly known as: PROVENTIL     amiodarone 200 MG tablet  Commonly known as: CORDARONE     aspirin 81 MG EC tablet  Replaced by: aspirin 81 MG chewable tablet     budesonide 0.5 MG/2ML nebulizer suspension  Commonly known as: PULMICORT     fluticasone-salmeterol 250-50 MCG/DOSE Aepb  Commonly known as: Advair Diskus  Replaced by: budesonide-formoterol 160-4.5 MCG/ACT Aero     hydrALAZINE 25 MG tablet  Commonly known as: APRESOLINE     insulin glargine 100 UNIT/ML injection vial  Commonly known as: LANTUS     ipratropium 0.5 mg-albuterol 2.5 mg 0.5-2.5 (3) MG/3ML Soln nebulizer solution  Commonly known as: DUONEB

## 2025-06-21 NOTE — PLAN OF CARE
Problem: Chronic Conditions and Co-morbidities  Goal: Patient's chronic conditions and co-morbidity symptoms are monitored and maintained or improved  Outcome: Progressing     Problem: Discharge Planning  Goal: Discharge to home or other facility with appropriate resources  Outcome: Progressing     Problem: Skin/Tissue Integrity  Goal: Skin integrity remains intact  Description: 1.  Monitor for areas of redness and/or skin breakdown  2.  Assess vascular access sites hourly  3.  Every 4-6 hours minimum:  Change oxygen saturation probe site  4.  Every 4-6 hours:  If on nasal continuous positive airway pressure, respiratory therapy assess nares and determine need for appliance change or resting period  Outcome: Progressing     Problem: Safety - Adult  Goal: Free from fall injury  Outcome: Progressing     Problem: ABCDS Injury Assessment  Goal: Absence of physical injury  Outcome: Progressing     Problem: Risk for Elopement  Goal: Patient will not exit the unit/facility without proper excort  Outcome: Progressing     Problem: Respiratory - Adult  Goal: Clear lung sounds  6/21/2025 0750 by Geneva Mathis, P  Outcome: Progressing  Note: Maintenance  Patient mutually agreed on goals.         Problem: Cardiovascular - Adult  Goal: Maintains optimal cardiac output and hemodynamic stability  Outcome: Progressing  Goal: Absence of cardiac dysrhythmias or at baseline  Outcome: Progressing     Problem: Pain  Goal: Verbalizes/displays adequate comfort level or baseline comfort level  Outcome: Progressing

## 2025-06-21 NOTE — PROGRESS NOTES
[facility]--HOSPITALIST GROUP    Hospitalist PROGRESS NOTE dictated by Lexa Bermudez MD on 2025    Patient ID: Yaakov Kemp is 59 y.o. and presently in room -36/036-A  : 1965 MRN: [id number]    Admit date: 6/15/2025  Primary Care Physician: No primary care provider on file.   Patient Care Team:  Shu Marsh APRN - CNP as PCP - Empaneled Provider  Tel: None  Admitting Physician: Lexa Bermudez MD   Code Status:  Full Code    Yaakov Kemp is a 59 y.o.  male who presented with Fatigue and Shortness of Breath    Room: [facility]36036-A  Admit date: 6/15/2025    Per report: Yaakov Kemp is a 59 y.o. male with PMHx of CAD, HFrEF, IDDM 2, HTN, TIA who presents to [facility] from home with chest pain. Patient was admitted at [facility] for 2 weeks. Initially went in for pneumonia. Patient had MI and underwent LHC which found multivessel disease. Patient was deemed not a candidate for CABG at the time due to EF of 25%. Patient was given LifeVest. Patient also requiring new onset dialysis. Patient also had TIA. Patient decided to leave A because he did not want to be on the rehab floor. Patient was not sent home on GDMT or treatment for CVA. I consulted cards and nephro   --------    2025: Patient new ESRD on HD, CAD, HF R EF 25%, seen this morning at dialysis did not appear dyspneic although he stated he still has some shortness of breath.  No chest pain or cough.  On 3 L O2 nasal cannula.  --------------    2025: Patient with new ESRD on HD, CAD, LVEF 25%, LifeVest, DM, HTN, right BKA and recent TIA.  Patient on heparin and nitroglycerin drips for ischemic cardiomyopathy with elevated troponin level.    Patient seen this morning and states has intermittent chest pain but presently chest pain-free. Denies cough. Also has some edema shortness of breath requiring 2 to 3 L O2 nasal cannula.     Yesterday started on  Mode: Intermittent  Pulse via Oximetry: 67 beats per minute  Pulse Oximeter Device Location: Finger  O2 Device: Nasal cannula  O2 Flow Rate (L/min): 4 L/min  Skin Assessment: Clean, dry, & intact      Vitals reviewed.    H&N: normocephalic, anicteric, no conjunctivitis, no exophthalmos, nose and ears appear normal, trachea mid line, no stridor,  Chest:  fairly good air entry, no wheezes,    Heart: normal heart sounds, regular rate and rhythm, no gallops or rubs,  Abdomen: BS present, non-tender, no masses or organomegaly detected,   Extremities: no peripheral edema, no cyanosis. no oncholysis. Skin: no rash, no jaundice,   CNS: grossly normal without cranial nerve deficits, no abnormal coordination or tone,   Psychiatric:       LABS:    ABGs: No results for input(s): \"PHART\", \"PO2ART\", \"VLG5NNI\", \"IET9UGF\", \"BEART\", \"X4RLEKXF\", \"VKO3WYQ\" in the last 72 hours.     CBC:   Recent Labs     06/18/25  0712 06/19/25  0838 06/20/25  0407   WBC 10.2 9.5 9.8   RBC 2.57* 2.35* 2.55*   HGB 7.7* 7.1* 7.6*   HCT 25.9* 23.6* 25.1*   .8* 100.4* 98.4*   MCH 30.0 30.2 29.8   MCHC 29.7* 30.1* 30.3*    116* 135   MPV 11.3 11.9 11.7       MAG:   No results for input(s): \"MG\" in the last 72 hours.      CMP:   Recent Labs     06/18/25  0712 06/19/25  0838 06/20/25  0407   * 133* 129*   K 5.0 4.7 4.9   CL 93* 94* 91*   CO2 24 23 21*   BUN 30* 27* 34*   CREATININE 5.1* 4.6* 5.5*   GLUCOSE 158* 129* 184*   CALCIUM 8.7 8.4* 7.7*      No results for input(s): \"LIPASE\", \"AMYLASE\" in the last 72 hours.      Phosphorus:  No results for input(s): \"PHOS\" in the last 72 hours.  Albumin: No results for input(s): \"LABALBU\" in the last 72 hours.    UA:No results for input(s): \"SPECGRAV\", \"PHUR\", \"COLORU\", \"CLARITYU\", \"MUCUS\", \"PROTEINU\", \"BLOODU\", \"RBCUA\", \"WBCUA\", \"BACTERIA\", \"NITRU\", \"GLUCOSEU\", \"BILIRUBINUR\", \"UROBILINOGEN\", \"KETUA\", \"LABCAST\", \"LABCASTTY\", \"AMORPHOS\" in the last 72 hours.    Invalid input(s): \"CRYSTALS\"  Micro:  ___________________________________    FINDINGS:  Cardiac monitoring leads are present.    The lungs are expanded. There is diffuse prominence of bronchovascular  markings. There appears be heterogeneous bilateral basilar airspace  consolidation and groundglass attenuation. There may also be some  associated interstitial thickening. There appear to be small bilateral  pleural effusions.    There is mild cardiac enlargement.   Normal mediastinum and velvet.  There  is prominence of the pulmonary hilar arteries with peripheral pulmonary  vascular congestion.  There is atherosclerotic calcification of the aortic  arch with tortuosity.    There are diffuse degenerative changes of the visualized thoracic spine.  Normal visualized ribs, clavicles, and shoulders.    There is no demonstrated abnormality of the visualized soft tissue  structures of the upper abdomen.  ___________________________________    IMPRESSION:  Heterogeneous bilateral basilar airspace disease suggests multifocal  pneumonia.    Electronically Signed:  Priya Collado MD  2025/05/28 at 0:53 EDT  Reading Location ID and State: [id number] / [place]  Tel [phone], Service support  [phone], Fax [phone]          cc: Jaime Butler DO; Shu Marsh CNP      Dictated by:  Priya Collado MD on 05/28/25 0053  Transcribed by:  Priya Collado on 05/28/25 0053    Report Signed by:  Priya Collado MD on 05/28/25 0053      This note was dictated using M*Modal Fluency Direct so please excuse any grammatical or syntax errors as no guarantees can be provided that every mistake has been identified and corrected by editing.      Electronically signed by Lexa Bermudez MD on 6/21/2025 at 7:10 AM

## 2025-06-22 NOTE — PROGRESS NOTES
1950[facility] team came in to get patient for transport to [facility]. Hand off report given to LACP staff with all the document needed handed to him. Vital signs taken and assessment done. All belongings were taken by the wife.   2005H: Left the hospital with ongoing heparin drip and oxygen at 4L,vitally stable accompanied by [facility] staff and family.

## 2025-07-01 ENCOUNTER — CARE COORDINATION (OUTPATIENT)
Dept: CARE COORDINATION | Age: 60
End: 2025-07-01

## 2025-07-01 NOTE — CARE COORDINATION
Care Transitions Note    Initial Call - Call within 2 business days of discharge: Yes    Patient Current Location:  Home: [address] [address]    Care Transition Nurse contacted the patient by telephone to perform post hospital discharge assessment, verified name and  as identifiers.  Provided introduction to self, and explanation of the Care Transition Nurse role.    Patient: Yaakov Kemp    Patient : 1965   MRN: [id number]    Reason for Admission: fatigue, SOB, CP, transferred to [facility] on 25  Discharge Date: 25  RURS: Readmission Risk Score: 20      Last Discharge Facility       Date Complaint Diagnosis Description Type Department Provider    6/15/25 Fatigue; Shortness of Breath Chest pain, unspecified type ... ED to Hosp-Admission (Discharged) (ADMITTED) Lexa Vega MD; Jose Miguel Gill M...            Was this an external facility discharge? Yes. Discharge Date: 25. Facility Name: [facility]    Additional needs identified to be addressed with provider   No needs identified             Method of communication with provider: none.    Patients top risk factors for readmission: medical condition-CAD, HF, ESRD on dialysis, Essential HTN, Asthma, DM, HLD and multiple health system providers    Interventions to address risk factors:   Education: discharge instructions   Review of patient management of conditions/medications:      Care Summary Note: Contacted pt for initial care transition follow up.  Yaakov states he is doing good since returning home from [facility].  States they did everything at [facility].  Had thoracentesis done.  No longer feeling very much shortness of breath.  Does not feel as fatigue and has some energy.  He is not overexerting himself.  Denies having any chest pain/discomfort, pressure, tightness, lightheadedness/dizziness, increased swelling.  Reports weight may have increased while in the hospital.  Will continue to

## 2025-07-08 ENCOUNTER — HOSPITAL ENCOUNTER (OUTPATIENT)
Dept: GENERAL RADIOLOGY | Age: 60
Discharge: HOME | End: 2025-07-08

## 2025-07-08 ENCOUNTER — CARE COORDINATION (OUTPATIENT)
Dept: CARE COORDINATION | Age: 60
End: 2025-07-08

## 2025-07-08 DIAGNOSIS — Z00.6 ENCOUNTER FOR EXAMINATION FOR NORMAL COMPARISON OR CONTROL IN CLINICAL RESEARCH PROGRAM: ICD-10-CM

## 2025-07-08 NOTE — CARE COORDINATION
Care Transitions Note    Follow Up Call     Attempted to reach patient for transitions of care follow up.  Unable to reach patient.      Outreach Attempts:   Unable to leave message.     Care Summary Note: Attempted to contact pt for care transition follow up.  Unable to reach pt.  Unable to leave a message d/t voice mailbox not being set up.  Will try again at a later time.      Follow Up Appointment:       Plan for follow-up call in 2-5 days based on severity of symptoms and risk factors. Plan for next call: symptom management-SOB, Fatigue, CP, swelling, weight, urinary, bowels, oral intake, any new or worsening symptoms, did he find a PCP?  Next call final if stable     Sarita Pacheco RN

## 2025-07-09 ENCOUNTER — CARE COORDINATION (OUTPATIENT)
Dept: CARE COORDINATION | Age: 60
End: 2025-07-09

## 2025-07-09 NOTE — CARE COORDINATION
Care Transitions Note    Final Call     Patient Current Location:  Home: [address] [address]    Care Transition Nurse contacted the patient by telephone. Verified name and  as identifiers.    Patient graduated from the Care Transitions program on 25.    Advance Care Planning:   Does patient have an Advance Directive: not on file.    Handoff:   Patient was not referred to the ACM team due to patient not eligible for ACM services due to insurance.      Care Summary Note: Contacted pt for care transition follow up.  Spoke briefly with pt.  Yaakov states he is doing alright.  Goes to dialysis 3 times a week.  Reports being a little short of breath but always feels better after dialysis.  States he had gained 12 lbs from Friday to Monday.  Had dialysis and removed 8 lbs of fluid.  Continues to have the swelling in BLE.  May also have swelling in hands at times.  Pt has an appt with CC on 25.  He still plans to establish care with PCP in [place].  Pt will be working with [facility] for therapy.  States he cannot do therapy right now d/t the swelling in legs.  No questions or concerns at this time.  No further outreach.     Assessments:  Care Transitions Subsequent and Final Call    Subsequent and Final Calls  Do you have any ongoing symptoms?: Yes  Patient-reported symptoms: Weight Gain, Shortness of Breath, Other  Have your medications changed?: No  Do you have any questions related to your medications?: No  Do you currently have any active services?: Yes  Are you currently active with any services?: Home Health  Do you have any needs or concerns that I can assist you with?: No  Care Transitions Interventions  Other Interventions:              Upcoming Appointments:        Patient has agreed to contact primary care provider and/or specialist for any further questions, concerns, or needs.    Sarita Pacheco RN

## (undated) DEVICE — SOLUTION IV 1000ML 0.45% SOD CHL PH 5 INJ USP VIAFLX PLAS

## (undated) DEVICE — ENDO KIT: Brand: MEDLINE INDUSTRIES, INC.

## (undated) DEVICE — FORCEPS BX L L240CM DIA2.4MM RAD JAW 4 HOT FOR POLYP DISP

## (undated) DEVICE — SET ADMIN 25ML L117IN PMP MOD CK VLV RLER CLMP 2 SMRTSITE

## (undated) DEVICE — TUBING IV STOPCOCK 48 CM 3 W

## (undated) DEVICE — CATHETER ETER IV 22GA L1IN POLYUR STR RADPQ INTROCAN SFTY

## (undated) DEVICE — SUREFIT, DUAL DISPERSIVE ELECTRODE, CONTACT QUALITY MONITOR: Brand: SUREFIT

## (undated) DEVICE — SET LNR RED GRN W/ BASE CLEANASCOPE

## (undated) DEVICE — IV START KIT: Brand: MEDLINE INDUSTRIES, INC.